# Patient Record
Sex: FEMALE | Race: WHITE | NOT HISPANIC OR LATINO | Employment: OTHER | URBAN - METROPOLITAN AREA
[De-identification: names, ages, dates, MRNs, and addresses within clinical notes are randomized per-mention and may not be internally consistent; named-entity substitution may affect disease eponyms.]

---

## 2023-02-23 PROBLEM — M54.2 CHRONIC NECK PAIN: Status: ACTIVE | Noted: 2023-02-23

## 2023-02-23 PROBLEM — I38 VHD (VALVULAR HEART DISEASE): Status: ACTIVE | Noted: 2023-02-23

## 2023-02-23 PROBLEM — Z96.641 PRESENCE OF ARTIFICIAL HIP, RIGHT: Status: ACTIVE | Noted: 2023-02-23

## 2023-02-23 PROBLEM — N39.0 ACUTE UTI: Status: ACTIVE | Noted: 2023-02-23

## 2023-02-23 PROBLEM — M17.0 ARTHRITIS OF BOTH KNEES: Status: ACTIVE | Noted: 2023-02-23

## 2023-02-23 PROBLEM — H60.319: Status: ACTIVE | Noted: 2023-02-23

## 2023-02-23 PROBLEM — E55.9 HYPOVITAMINOSIS D: Status: ACTIVE | Noted: 2023-02-23

## 2023-02-23 PROBLEM — M77.8 TENDINITIS OF RIGHT SHOULDER: Status: ACTIVE | Noted: 2023-02-23

## 2023-02-23 PROBLEM — E78.5 HYPERLIPIDEMIA: Status: ACTIVE | Noted: 2023-02-23

## 2023-02-23 PROBLEM — J06.9 URI (UPPER RESPIRATORY INFECTION): Status: ACTIVE | Noted: 2023-02-23

## 2023-02-23 PROBLEM — F32.A DEPRESSION: Status: ACTIVE | Noted: 2023-02-23

## 2023-02-23 PROBLEM — R26.81 UNSTEADY GAIT: Status: ACTIVE | Noted: 2023-02-23

## 2023-02-23 PROBLEM — M25.572 SINUS TARSI SYNDROME OF LEFT ANKLE: Status: ACTIVE | Noted: 2023-02-23

## 2023-02-23 PROBLEM — G89.29 ACUTE EXACERBATION OF CHRONIC LOW BACK PAIN: Status: ACTIVE | Noted: 2023-02-23

## 2023-02-23 PROBLEM — B35.1 ONYCHOMYCOSIS: Status: ACTIVE | Noted: 2023-02-23

## 2023-02-23 PROBLEM — G95.9 CERVICAL MYELOPATHY (MULTI): Status: ACTIVE | Noted: 2023-02-23

## 2023-02-23 PROBLEM — R27.0 ATAXIA: Status: ACTIVE | Noted: 2023-02-23

## 2023-02-23 PROBLEM — M76.892 TENDINITIS OF LEFT HIP: Status: ACTIVE | Noted: 2023-02-23

## 2023-02-23 PROBLEM — M54.42 ACUTE LEFT-SIDED LOW BACK PAIN WITH LEFT-SIDED SCIATICA: Status: ACTIVE | Noted: 2023-02-23

## 2023-02-23 PROBLEM — M54.17 LUMBOSACRAL RADICULOPATHY AT S1: Status: ACTIVE | Noted: 2023-02-23

## 2023-02-23 PROBLEM — E03.9 HYPOTHYROIDISM: Status: ACTIVE | Noted: 2023-02-23

## 2023-02-23 PROBLEM — H60.90 OTITIS EXTERNA: Status: ACTIVE | Noted: 2023-02-23

## 2023-02-23 PROBLEM — K59.03 CONSTIPATION DUE TO PAIN MEDICATION: Status: ACTIVE | Noted: 2023-02-23

## 2023-02-23 PROBLEM — J30.9 ALLERGIC RHINITIS: Status: ACTIVE | Noted: 2023-02-23

## 2023-02-23 PROBLEM — S62.509A THUMB FRACTURE: Status: ACTIVE | Noted: 2023-02-23

## 2023-02-23 PROBLEM — R73.01 IFG (IMPAIRED FASTING GLUCOSE): Status: ACTIVE | Noted: 2023-02-23

## 2023-02-23 PROBLEM — R73.9 HYPERGLYCEMIA: Status: ACTIVE | Noted: 2023-02-23

## 2023-02-23 PROBLEM — M54.50 ACUTE EXACERBATION OF CHRONIC LOW BACK PAIN: Status: ACTIVE | Noted: 2023-02-23

## 2023-02-23 PROBLEM — L85.3 XEROSIS CUTIS: Status: ACTIVE | Noted: 2023-02-23

## 2023-02-23 PROBLEM — J01.90 ACUTE SINUSITIS: Status: ACTIVE | Noted: 2023-02-23

## 2023-02-23 PROBLEM — R42 VERTIGO: Status: ACTIVE | Noted: 2023-02-23

## 2023-02-23 PROBLEM — M75.51 BURSITIS OF RIGHT SHOULDER: Status: ACTIVE | Noted: 2023-02-23

## 2023-02-23 PROBLEM — I10 HYPERTENSION: Status: ACTIVE | Noted: 2023-02-23

## 2023-02-23 PROBLEM — M25.551 RIGHT HIP PAIN: Status: ACTIVE | Noted: 2023-02-23

## 2023-02-23 PROBLEM — H10.89 OTHER CONJUNCTIVITIS: Status: ACTIVE | Noted: 2023-02-23

## 2023-02-23 PROBLEM — K21.9 ACID REFLUX: Status: ACTIVE | Noted: 2023-02-23

## 2023-02-23 PROBLEM — S62.523A CLOSED FRACTURE OF DISTAL PHALANX OF THUMB: Status: ACTIVE | Noted: 2023-02-23

## 2023-02-23 PROBLEM — M77.8 OTHER ENTHESOPATHIES, NOT ELSEWHERE CLASSIFIED: Status: ACTIVE | Noted: 2023-02-23

## 2023-02-23 PROBLEM — L60.1 ONYCHOLYSIS: Status: ACTIVE | Noted: 2023-02-23

## 2023-02-23 PROBLEM — M25.572 LEFT LATERAL ANKLE PAIN: Status: ACTIVE | Noted: 2023-02-23

## 2023-02-23 PROBLEM — M25.562 KNEE PAIN, LEFT: Status: ACTIVE | Noted: 2023-02-23

## 2023-02-23 PROBLEM — M16.11 ARTHRITIS OF RIGHT HIP: Status: ACTIVE | Noted: 2023-02-23

## 2023-02-23 PROBLEM — R01.1 MURMUR, CARDIAC: Status: ACTIVE | Noted: 2023-02-23

## 2023-02-23 PROBLEM — M25.519 SHOULDER PAIN: Status: ACTIVE | Noted: 2023-02-23

## 2023-02-23 PROBLEM — G89.29 CHRONIC NECK PAIN: Status: ACTIVE | Noted: 2023-02-23

## 2023-02-23 RX ORDER — FLUTICASONE PROPIONATE 50 MCG
1 SPRAY, SUSPENSION (ML) NASAL DAILY
COMMUNITY

## 2023-02-23 RX ORDER — ACETAMINOPHEN 325 MG/1
TABLET ORAL EVERY 6 HOURS
COMMUNITY
Start: 2020-03-18

## 2023-02-23 RX ORDER — BUSPIRONE HYDROCHLORIDE 10 MG/1
1 TABLET ORAL 2 TIMES DAILY
COMMUNITY
Start: 2017-02-17 | End: 2023-06-28 | Stop reason: SDUPTHER

## 2023-02-23 RX ORDER — OMEPRAZOLE 20 MG/1
1 CAPSULE, DELAYED RELEASE ORAL DAILY
COMMUNITY
Start: 2016-04-12 | End: 2023-04-05 | Stop reason: SDUPTHER

## 2023-02-23 RX ORDER — AMMONIUM LACTATE 12 G/100G
LOTION TOPICAL
COMMUNITY
End: 2023-04-05 | Stop reason: ALTCHOICE

## 2023-02-23 RX ORDER — SERTRALINE HYDROCHLORIDE 50 MG/1
1.5 TABLET, FILM COATED ORAL DAILY
COMMUNITY
Start: 2017-06-28 | End: 2023-03-10

## 2023-02-23 RX ORDER — ASPIRIN 81 MG/1
81 TABLET ORAL DAILY
COMMUNITY

## 2023-02-23 RX ORDER — LOVASTATIN 40 MG/1
TABLET ORAL
COMMUNITY
Start: 2016-04-15 | End: 2023-06-04 | Stop reason: SDUPTHER

## 2023-02-23 RX ORDER — CHOLECALCIFEROL (VITAMIN D3) 25 MCG
25 TABLET ORAL DAILY
COMMUNITY

## 2023-02-23 RX ORDER — POLYETHYLENE GLYCOL 3350 17 G/17G
17 POWDER, FOR SOLUTION ORAL 2 TIMES DAILY
COMMUNITY
Start: 2020-03-18

## 2023-02-23 RX ORDER — HYDROCHLOROTHIAZIDE 25 MG/1
1 TABLET ORAL DAILY
COMMUNITY
Start: 2018-02-27 | End: 2023-06-04 | Stop reason: SDUPTHER

## 2023-02-23 RX ORDER — DOCUSATE SODIUM 100 MG/1
CAPSULE, LIQUID FILLED ORAL
COMMUNITY
Start: 2020-03-18 | End: 2023-04-05 | Stop reason: ALTCHOICE

## 2023-02-23 RX ORDER — LEVOTHYROXINE SODIUM 50 UG/1
TABLET ORAL
COMMUNITY
Start: 2018-02-27 | End: 2023-06-28 | Stop reason: SDUPTHER

## 2023-03-10 DIAGNOSIS — F32.A DEPRESSION, UNSPECIFIED DEPRESSION TYPE: Primary | ICD-10-CM

## 2023-03-10 RX ORDER — SERTRALINE HYDROCHLORIDE 50 MG/1
TABLET, FILM COATED ORAL
Qty: 135 TABLET | Refills: 0 | Status: SHIPPED | OUTPATIENT
Start: 2023-03-10 | End: 2023-06-28 | Stop reason: SDUPTHER

## 2023-04-05 ENCOUNTER — LAB (OUTPATIENT)
Dept: LAB | Facility: LAB | Age: 83
End: 2023-04-05
Payer: MEDICARE

## 2023-04-05 ENCOUNTER — OFFICE VISIT (OUTPATIENT)
Dept: PRIMARY CARE | Facility: CLINIC | Age: 83
End: 2023-04-05
Payer: MEDICARE

## 2023-04-05 VITALS
RESPIRATION RATE: 18 BRPM | BODY MASS INDEX: 30.8 KG/M2 | OXYGEN SATURATION: 96 % | WEIGHT: 163 LBS | SYSTOLIC BLOOD PRESSURE: 120 MMHG | DIASTOLIC BLOOD PRESSURE: 70 MMHG | TEMPERATURE: 97.5 F | HEART RATE: 69 BPM

## 2023-04-05 DIAGNOSIS — E06.3 HYPOTHYROIDISM DUE TO HASHIMOTO'S THYROIDITIS: ICD-10-CM

## 2023-04-05 DIAGNOSIS — M25.572 LEFT LATERAL ANKLE PAIN: ICD-10-CM

## 2023-04-05 DIAGNOSIS — E03.8 HYPOTHYROIDISM DUE TO HASHIMOTO'S THYROIDITIS: ICD-10-CM

## 2023-04-05 DIAGNOSIS — I10 PRIMARY HYPERTENSION: Primary | ICD-10-CM

## 2023-04-05 DIAGNOSIS — I10 PRIMARY HYPERTENSION: ICD-10-CM

## 2023-04-05 DIAGNOSIS — E78.49 OTHER HYPERLIPIDEMIA: ICD-10-CM

## 2023-04-05 DIAGNOSIS — M47.812 SPONDYLOSIS OF CERVICAL REGION WITHOUT MYELOPATHY OR RADICULOPATHY: ICD-10-CM

## 2023-04-05 DIAGNOSIS — K21.9 GASTROESOPHAGEAL REFLUX DISEASE, UNSPECIFIED WHETHER ESOPHAGITIS PRESENT: ICD-10-CM

## 2023-04-05 LAB
ALANINE AMINOTRANSFERASE (SGPT) (U/L) IN SER/PLAS: 10 U/L (ref 7–45)
ALBUMIN (G/DL) IN SER/PLAS: 4.2 G/DL (ref 3.4–5)
ALKALINE PHOSPHATASE (U/L) IN SER/PLAS: 52 U/L (ref 33–136)
ANION GAP IN SER/PLAS: 12 MMOL/L (ref 10–20)
ASPARTATE AMINOTRANSFERASE (SGOT) (U/L) IN SER/PLAS: 20 U/L (ref 9–39)
BILIRUBIN TOTAL (MG/DL) IN SER/PLAS: 0.6 MG/DL (ref 0–1.2)
CALCIUM (MG/DL) IN SER/PLAS: 9.5 MG/DL (ref 8.6–10.3)
CARBON DIOXIDE, TOTAL (MMOL/L) IN SER/PLAS: 33 MMOL/L (ref 21–32)
CHLORIDE (MMOL/L) IN SER/PLAS: 101 MMOL/L (ref 98–107)
CREATININE (MG/DL) IN SER/PLAS: 0.75 MG/DL (ref 0.5–1.05)
GFR FEMALE: 80 ML/MIN/1.73M2
GLUCOSE (MG/DL) IN SER/PLAS: 94 MG/DL (ref 74–99)
POTASSIUM (MMOL/L) IN SER/PLAS: 4.8 MMOL/L (ref 3.5–5.3)
PROTEIN TOTAL: 7.2 G/DL (ref 6.4–8.2)
SODIUM (MMOL/L) IN SER/PLAS: 141 MMOL/L (ref 136–145)
UREA NITROGEN (MG/DL) IN SER/PLAS: 17 MG/DL (ref 6–23)

## 2023-04-05 PROCEDURE — 3078F DIAST BP <80 MM HG: CPT | Performed by: INTERNAL MEDICINE

## 2023-04-05 PROCEDURE — 1036F TOBACCO NON-USER: CPT | Performed by: INTERNAL MEDICINE

## 2023-04-05 PROCEDURE — 1159F MED LIST DOCD IN RCRD: CPT | Performed by: INTERNAL MEDICINE

## 2023-04-05 PROCEDURE — 99214 OFFICE O/P EST MOD 30 MIN: CPT | Performed by: INTERNAL MEDICINE

## 2023-04-05 PROCEDURE — 36415 COLL VENOUS BLD VENIPUNCTURE: CPT

## 2023-04-05 PROCEDURE — 3074F SYST BP LT 130 MM HG: CPT | Performed by: INTERNAL MEDICINE

## 2023-04-05 PROCEDURE — 80053 COMPREHEN METABOLIC PANEL: CPT

## 2023-04-05 RX ORDER — OMEPRAZOLE 20 MG/1
20 CAPSULE, DELAYED RELEASE ORAL DAILY
Qty: 90 CAPSULE | Refills: 1 | Status: SHIPPED | OUTPATIENT
Start: 2023-04-05 | End: 2023-06-28 | Stop reason: SDUPTHER

## 2023-04-05 ASSESSMENT — PATIENT HEALTH QUESTIONNAIRE - PHQ9
SUM OF ALL RESPONSES TO PHQ9 QUESTIONS 1 AND 2: 0
2. FEELING DOWN, DEPRESSED OR HOPELESS: NOT AT ALL
1. LITTLE INTEREST OR PLEASURE IN DOING THINGS: NOT AT ALL

## 2023-04-05 NOTE — PROGRESS NOTES
Subjective   Patient ID: Ayo Bueno is a 82 y.o. female who presents for Follow-up (Patient seems unstable when walking.  C/o neck pain.).    HPI patient presents to the office for scheduled visit with daughter who reports to me today a couple of complaints 1 is that neck pain that has been persistent and chronic as well as now for some pain involving the Achilles and heel of the left foot.  She denies any evidence of trauma she has difficulty with walking although she does not really move much during the day but is able to dorsiflex and plantarflex without any problems    She takes Tylenol once a day in the mornings 2 pills at    Day with improvement of pain completely.    Otherwise blood pressure is good    Is without any chest pain or dyspnea no significant complaints thyroid status    Continues to be on omeprazole for GERD    Blood pressure is good on the thiazide diuretic and mood is good on buspirone    Zoloft is also at this point continue        Review of Systems    Objective   /70 (BP Location: Left arm, Patient Position: Sitting)   Pulse 69   Temp 36.4 °C (97.5 °F)   Resp 18   Wt 73.9 kg (163 lb)   SpO2 96%   BMI 30.80 kg/m²     Physical Exam HEENT normocephalic atraumatic pupils round and reactive no oropharyngeal exudate no thyromegaly  Carotid bruits absent range of motion of the neck is somewhat limited in all directions  Cardiovascular regular rate and rhythm no murmurs  Respiratory bilateral breath sounds without rales or rhonchi or mitral chest expansion bilaterally  Abdomen soft nontender bowel sounds are present no organomegaly  Skin warm without any rashes  Musculoskeletal no digital clubbing or cyanosis normal gait no muscle atrophy  Neuro alert and oriented Ãƒ-3. Speech clear. Follows commands appropriately  Pulse normal carotid pulse normal radial pulse normal pedal pulses  Achilles tendon is completely intact with no evidence of any tendinosis although discomfort is  noted upon complete dorsiflexion.    No trauma change in color or bruising.    Assessment/Plan   Problem List Items Addressed This Visit          Circulatory    Hypertension - Primary     Will continue the current treatment blood pressure no changes required to obtain a renal panel         Relevant Orders    Comprehensive Metabolic Panel       Digestive    Acid reflux    Relevant Medications    omeprazole (PriLOSEC) 20 mg DR capsule       Musculoskeletal    Left lateral ankle pain     Although she complains of pain around the lower Achilles and calcaneus without evidence of planter fasciitis she reports the pain to be more of an ankle in the region but at this point exam not consistent with I suspect this is arthritis but noted to be effective and will continue to follow her liver panel I recommended Tylenol is effective this is the easiest and best way to continue         Spondylosis of cervical region without myelopathy or radiculopathy       Endocrine/Metabolic    Hypothyroidism     Continue current dosing            Other    Hyperlipidemia     Continue continue with no change

## 2023-04-05 NOTE — ASSESSMENT & PLAN NOTE
She seems to tolerate treatment with NT anxiety antidepressant medication which is both buspirone and Zoloft electrolytes to be obtained but no changes noted next visit I recommended EKG as well.

## 2023-04-05 NOTE — ASSESSMENT & PLAN NOTE
Although she complains of pain around the lower Achilles and calcaneus without evidence of planter fasciitis she reports the pain to be more of an ankle in the region but at this point exam not consistent with I suspect this is arthritis but noted to be effective and will continue to follow her liver panel I recommended Tylenol is effective this is the easiest and best way to continue

## 2023-06-02 DIAGNOSIS — I10 HYPERTENSION, UNSPECIFIED TYPE: ICD-10-CM

## 2023-06-02 DIAGNOSIS — E78.5 HYPERLIPIDEMIA, UNSPECIFIED HYPERLIPIDEMIA TYPE: ICD-10-CM

## 2023-06-04 RX ORDER — LOVASTATIN 40 MG/1
TABLET ORAL
Qty: 90 TABLET | Refills: 0 | Status: SHIPPED | OUTPATIENT
Start: 2023-06-04 | End: 2023-06-28 | Stop reason: SDUPTHER

## 2023-06-04 RX ORDER — HYDROCHLOROTHIAZIDE 25 MG/1
TABLET ORAL
Qty: 90 TABLET | Refills: 0 | Status: SHIPPED | OUTPATIENT
Start: 2023-06-04 | End: 2023-06-28 | Stop reason: SDUPTHER

## 2023-06-28 DIAGNOSIS — E06.3 HYPOTHYROIDISM DUE TO HASHIMOTO'S THYROIDITIS: Primary | ICD-10-CM

## 2023-06-28 DIAGNOSIS — E03.8 HYPOTHYROIDISM DUE TO HASHIMOTO'S THYROIDITIS: Primary | ICD-10-CM

## 2023-06-28 DIAGNOSIS — K21.9 GASTROESOPHAGEAL REFLUX DISEASE, UNSPECIFIED WHETHER ESOPHAGITIS PRESENT: ICD-10-CM

## 2023-06-28 DIAGNOSIS — E78.5 HYPERLIPIDEMIA, UNSPECIFIED HYPERLIPIDEMIA TYPE: ICD-10-CM

## 2023-06-28 DIAGNOSIS — F32.A DEPRESSION, UNSPECIFIED DEPRESSION TYPE: ICD-10-CM

## 2023-06-28 DIAGNOSIS — F41.9 ANXIETY: ICD-10-CM

## 2023-06-28 DIAGNOSIS — I10 HYPERTENSION, UNSPECIFIED TYPE: ICD-10-CM

## 2023-06-29 RX ORDER — LOVASTATIN 40 MG/1
TABLET ORAL
Qty: 90 TABLET | Refills: 0 | Status: SHIPPED | OUTPATIENT
Start: 2023-06-29 | End: 2024-04-18 | Stop reason: SDUPTHER

## 2023-06-29 RX ORDER — LEVOTHYROXINE SODIUM 50 UG/1
TABLET ORAL
Qty: 108 TABLET | Refills: 0 | Status: SHIPPED | OUTPATIENT
Start: 2023-06-29 | End: 2023-11-10 | Stop reason: SDUPTHER

## 2023-06-29 RX ORDER — BUSPIRONE HYDROCHLORIDE 10 MG/1
10 TABLET ORAL 2 TIMES DAILY
Qty: 180 TABLET | Refills: 0 | Status: SHIPPED | OUTPATIENT
Start: 2023-06-29

## 2023-06-29 RX ORDER — HYDROCHLOROTHIAZIDE 25 MG/1
25 TABLET ORAL DAILY
Qty: 90 TABLET | Refills: 0 | Status: SHIPPED | OUTPATIENT
Start: 2023-06-29 | End: 2024-04-18 | Stop reason: SDUPTHER

## 2023-06-29 RX ORDER — OMEPRAZOLE 20 MG/1
20 CAPSULE, DELAYED RELEASE ORAL DAILY
Qty: 90 CAPSULE | Refills: 0 | Status: SHIPPED | OUTPATIENT
Start: 2023-06-29 | End: 2024-02-15 | Stop reason: SDUPTHER

## 2023-06-29 RX ORDER — SERTRALINE HYDROCHLORIDE 50 MG/1
75 TABLET, FILM COATED ORAL DAILY
Qty: 135 TABLET | Refills: 0 | Status: SHIPPED | OUTPATIENT
Start: 2023-06-29 | End: 2024-03-01 | Stop reason: DRUGHIGH

## 2023-08-30 ENCOUNTER — OFFICE VISIT (OUTPATIENT)
Dept: PRIMARY CARE | Facility: CLINIC | Age: 83
End: 2023-08-30
Payer: MEDICARE

## 2023-08-30 VITALS
SYSTOLIC BLOOD PRESSURE: 128 MMHG | DIASTOLIC BLOOD PRESSURE: 80 MMHG | TEMPERATURE: 97.9 F | HEART RATE: 78 BPM | BODY MASS INDEX: 30.43 KG/M2 | WEIGHT: 155 LBS | HEIGHT: 60 IN | OXYGEN SATURATION: 97 %

## 2023-08-30 DIAGNOSIS — E78.49 OTHER HYPERLIPIDEMIA: ICD-10-CM

## 2023-08-30 DIAGNOSIS — E06.3 HYPOTHYROIDISM DUE TO HASHIMOTO'S THYROIDITIS: ICD-10-CM

## 2023-08-30 DIAGNOSIS — Z79.899 HIGH RISK MEDICATION USE: ICD-10-CM

## 2023-08-30 DIAGNOSIS — E03.8 HYPOTHYROIDISM DUE TO HASHIMOTO'S THYROIDITIS: ICD-10-CM

## 2023-08-30 DIAGNOSIS — I35.0 AORTIC VALVE STENOSIS, ETIOLOGY OF CARDIAC VALVE DISEASE UNSPECIFIED: ICD-10-CM

## 2023-08-30 DIAGNOSIS — I10 PRIMARY HYPERTENSION: ICD-10-CM

## 2023-08-30 DIAGNOSIS — J31.0 CHRONIC RHINITIS: Primary | ICD-10-CM

## 2023-08-30 LAB
ALANINE AMINOTRANSFERASE (SGPT) (U/L) IN SER/PLAS: 11 U/L (ref 7–45)
ALBUMIN (G/DL) IN SER/PLAS: 4.1 G/DL (ref 3.4–5)
ALKALINE PHOSPHATASE (U/L) IN SER/PLAS: 46 U/L (ref 33–136)
ANION GAP IN SER/PLAS: 14 MMOL/L (ref 10–20)
ASPARTATE AMINOTRANSFERASE (SGOT) (U/L) IN SER/PLAS: 21 U/L (ref 9–39)
BILIRUBIN TOTAL (MG/DL) IN SER/PLAS: 0.7 MG/DL (ref 0–1.2)
CALCIUM (MG/DL) IN SER/PLAS: 9.5 MG/DL (ref 8.6–10.6)
CARBON DIOXIDE, TOTAL (MMOL/L) IN SER/PLAS: 31 MMOL/L (ref 21–32)
CHLORIDE (MMOL/L) IN SER/PLAS: 103 MMOL/L (ref 98–107)
CHOLESTEROL (MG/DL) IN SER/PLAS: 144 MG/DL (ref 0–199)
CHOLESTEROL IN HDL (MG/DL) IN SER/PLAS: 50.8 MG/DL
CHOLESTEROL/HDL RATIO: 2.8
CREATININE (MG/DL) IN SER/PLAS: 0.67 MG/DL (ref 0.5–1.05)
ERYTHROCYTE DISTRIBUTION WIDTH (RATIO) BY AUTOMATED COUNT: 13.8 % (ref 11.5–14.5)
ERYTHROCYTE MEAN CORPUSCULAR HEMOGLOBIN CONCENTRATION (G/DL) BY AUTOMATED: 33.8 G/DL (ref 32–36)
ERYTHROCYTE MEAN CORPUSCULAR VOLUME (FL) BY AUTOMATED COUNT: 94 FL (ref 80–100)
ERYTHROCYTES (10*6/UL) IN BLOOD BY AUTOMATED COUNT: 3.94 X10E12/L (ref 4–5.2)
GFR FEMALE: 88 ML/MIN/1.73M2
GLUCOSE (MG/DL) IN SER/PLAS: 107 MG/DL (ref 74–99)
HEMATOCRIT (%) IN BLOOD BY AUTOMATED COUNT: 37 % (ref 36–46)
HEMOGLOBIN (G/DL) IN BLOOD: 12.5 G/DL (ref 12–16)
LDL: 70 MG/DL (ref 0–99)
LEUKOCYTES (10*3/UL) IN BLOOD BY AUTOMATED COUNT: 3.3 X10E9/L (ref 4.4–11.3)
NRBC (PER 100 WBCS) BY AUTOMATED COUNT: 0 /100 WBC (ref 0–0)
PLATELETS (10*3/UL) IN BLOOD AUTOMATED COUNT: 162 X10E9/L (ref 150–450)
POTASSIUM (MMOL/L) IN SER/PLAS: 3.7 MMOL/L (ref 3.5–5.3)
PROTEIN TOTAL: 7.2 G/DL (ref 6.4–8.2)
SODIUM (MMOL/L) IN SER/PLAS: 144 MMOL/L (ref 136–145)
THYROTROPIN (MIU/L) IN SER/PLAS BY DETECTION LIMIT <= 0.05 MIU/L: 1.07 MIU/L (ref 0.44–3.98)
TRIGLYCERIDE (MG/DL) IN SER/PLAS: 114 MG/DL (ref 0–149)
UREA NITROGEN (MG/DL) IN SER/PLAS: 22 MG/DL (ref 6–23)
VLDL: 23 MG/DL (ref 0–40)

## 2023-08-30 PROCEDURE — 84443 ASSAY THYROID STIM HORMONE: CPT

## 2023-08-30 PROCEDURE — 85027 COMPLETE CBC AUTOMATED: CPT

## 2023-08-30 PROCEDURE — 1159F MED LIST DOCD IN RCRD: CPT | Performed by: FAMILY MEDICINE

## 2023-08-30 PROCEDURE — 3074F SYST BP LT 130 MM HG: CPT | Performed by: FAMILY MEDICINE

## 2023-08-30 PROCEDURE — 80061 LIPID PANEL: CPT

## 2023-08-30 PROCEDURE — 80053 COMPREHEN METABOLIC PANEL: CPT

## 2023-08-30 PROCEDURE — 3079F DIAST BP 80-89 MM HG: CPT | Performed by: FAMILY MEDICINE

## 2023-08-30 PROCEDURE — 1036F TOBACCO NON-USER: CPT | Performed by: FAMILY MEDICINE

## 2023-08-30 PROCEDURE — 1126F AMNT PAIN NOTED NONE PRSNT: CPT | Performed by: FAMILY MEDICINE

## 2023-08-30 PROCEDURE — 99214 OFFICE O/P EST MOD 30 MIN: CPT | Performed by: FAMILY MEDICINE

## 2023-08-30 RX ORDER — IPRATROPIUM BROMIDE 42 UG/1
2 SPRAY, METERED NASAL 3 TIMES DAILY PRN
Qty: 15 ML | Refills: 12 | Status: SHIPPED | OUTPATIENT
Start: 2023-08-30 | End: 2023-08-30 | Stop reason: SDUPTHER

## 2023-08-30 RX ORDER — IPRATROPIUM BROMIDE 42 UG/1
2 SPRAY, METERED NASAL 3 TIMES DAILY PRN
Qty: 15 ML | Refills: 12 | Status: SHIPPED | OUTPATIENT
Start: 2023-08-30 | End: 2024-08-29

## 2023-08-30 SDOH — ECONOMIC STABILITY: INCOME INSECURITY: IN THE LAST 12 MONTHS, WAS THERE A TIME WHEN YOU WERE NOT ABLE TO PAY THE MORTGAGE OR RENT ON TIME?: NO

## 2023-08-30 SDOH — ECONOMIC STABILITY: HOUSING INSECURITY
IN THE LAST 12 MONTHS, WAS THERE A TIME WHEN YOU DID NOT HAVE A STEADY PLACE TO SLEEP OR SLEPT IN A SHELTER (INCLUDING NOW)?: NO

## 2023-08-30 SDOH — ECONOMIC STABILITY: FOOD INSECURITY: WITHIN THE PAST 12 MONTHS, THE FOOD YOU BOUGHT JUST DIDN'T LAST AND YOU DIDN'T HAVE MONEY TO GET MORE.: NEVER TRUE

## 2023-08-30 SDOH — ECONOMIC STABILITY: TRANSPORTATION INSECURITY
IN THE PAST 12 MONTHS, HAS LACK OF TRANSPORTATION KEPT YOU FROM MEETINGS, WORK, OR FROM GETTING THINGS NEEDED FOR DAILY LIVING?: NO

## 2023-08-30 SDOH — ECONOMIC STABILITY: TRANSPORTATION INSECURITY
IN THE PAST 12 MONTHS, HAS THE LACK OF TRANSPORTATION KEPT YOU FROM MEDICAL APPOINTMENTS OR FROM GETTING MEDICATIONS?: NO

## 2023-08-30 SDOH — ECONOMIC STABILITY: FOOD INSECURITY: WITHIN THE PAST 12 MONTHS, YOU WORRIED THAT YOUR FOOD WOULD RUN OUT BEFORE YOU GOT MONEY TO BUY MORE.: NEVER TRUE

## 2023-08-30 ASSESSMENT — ENCOUNTER SYMPTOMS
OCCASIONAL FEELINGS OF UNSTEADINESS: 0
LOSS OF SENSATION IN FEET: 0
DEPRESSION: 0

## 2023-08-30 ASSESSMENT — SOCIAL DETERMINANTS OF HEALTH (SDOH)
WITHIN THE LAST YEAR, HAVE YOU BEEN AFRAID OF YOUR PARTNER OR EX-PARTNER?: NO
WITHIN THE LAST YEAR, HAVE YOU BEEN HUMILIATED OR EMOTIONALLY ABUSED IN OTHER WAYS BY YOUR PARTNER OR EX-PARTNER?: NO
HOW HARD IS IT FOR YOU TO PAY FOR THE VERY BASICS LIKE FOOD, HOUSING, MEDICAL CARE, AND HEATING?: NOT HARD AT ALL
WITHIN THE LAST YEAR, HAVE TO BEEN RAPED OR FORCED TO HAVE ANY KIND OF SEXUAL ACTIVITY BY YOUR PARTNER OR EX-PARTNER?: NO
IN THE PAST 12 MONTHS, HAS THE ELECTRIC, GAS, OIL, OR WATER COMPANY THREATENED TO SHUT OFF SERVICE IN YOUR HOME?: NO
WITHIN THE LAST YEAR, HAVE YOU BEEN KICKED, HIT, SLAPPED, OR OTHERWISE PHYSICALLY HURT BY YOUR PARTNER OR EX-PARTNER?: NO

## 2023-08-30 ASSESSMENT — LIFESTYLE VARIABLES
SKIP TO QUESTIONS 9-10: 1
HOW OFTEN DO YOU HAVE A DRINK CONTAINING ALCOHOL: NEVER
HOW OFTEN DO YOU HAVE SIX OR MORE DRINKS ON ONE OCCASION: NEVER
HOW MANY STANDARD DRINKS CONTAINING ALCOHOL DO YOU HAVE ON A TYPICAL DAY: PATIENT DOES NOT DRINK
AUDIT-C TOTAL SCORE: 0

## 2023-08-30 ASSESSMENT — PAIN SCALES - GENERAL: PAINLEVEL: 0-NO PAIN

## 2023-08-30 NOTE — PROGRESS NOTES
Subjective   Patient ID: Ayo Bueno is a 82 y.o. female who presents for New Patient Visit (Npv est/).    HPI patient here today to establish.  She is companied by her  and daughter.  Patient states she has been having chronic neck and low back discomforts.  These been going on for many months.  Daughter feels she is overall doing well.  Past medical history, information in the chart including labs were reviewed.  Patient does complain of chronic sinus drainage.  She would like to try something to help with this.    Review of Systems    Objective   /80 (BP Location: Left arm)   Pulse 78   Temp 36.6 °C (97.9 °F) (Temporal)   Ht 1.524 m (5')   Wt 70.3 kg (155 lb)   SpO2 97%   BMI 30.27 kg/m²     Physical Exam  Alert, pleasant and in no acute distress.  Heart: Regular rate and rhythm with 3/6 murmur  Lungs: Clear to auscultation  Lower extremities: No edema  Assessment/Plan   Problem List Items Addressed This Visit          Cardiac and Vasculature    Hyperlipidemia    Relevant Orders    Lipid Panel    Hypertension    Relevant Orders    Comprehensive Metabolic Panel    CBC       Endocrine/Metabolic    Hypothyroidism    Relevant Orders    TSH with reflex to Free T4 if abnormal     Other Visit Diagnoses       Chronic rhinitis    -  Primary    Relevant Medications    ipratropium (Atrovent) 42 mcg (0.06 %) nasal spray    Aortic valve stenosis, etiology of cardiac valve disease unspecified        Relevant Orders    Transthoracic Echo (TTE) Complete    Referral to Cardiology    Comprehensive Metabolic Panel    CBC    High risk medication use        Relevant Orders    Comprehensive Metabolic Panel    CBC        Patient here to establish.  We are going to try some ipratropium to help her sinus congestion.  Patient has aortic valve stenosis on previous echocardiogram and on exam.  We will get an echo to follow-up on how it is progressing.  Patient to follow-up with cardiology to discuss things in  detail.  Patient has not had blood work done in the last year so we will get full labs.  We will contact as lab results come in.  Patient with neck and back discomforts.  We discussed the importance of therapeutic exercises.  Website information provided.  We will consider formal physical therapy if things are not improving.  Follow-up in 3 to 4 months.

## 2023-10-05 ENCOUNTER — APPOINTMENT (OUTPATIENT)
Dept: PRIMARY CARE | Facility: CLINIC | Age: 83
End: 2023-10-05
Payer: MEDICARE

## 2023-10-16 ENCOUNTER — HOSPITAL ENCOUNTER (OUTPATIENT)
Dept: CARDIOLOGY | Facility: CLINIC | Age: 83
Discharge: HOME | End: 2023-10-16
Payer: MEDICARE

## 2023-10-16 DIAGNOSIS — I35.0 NONRHEUMATIC AORTIC (VALVE) STENOSIS: ICD-10-CM

## 2023-10-16 DIAGNOSIS — I05.0 RHEUMATIC MITRAL STENOSIS: ICD-10-CM

## 2023-10-16 LAB — EJECTION FRACTION APICAL 4 CHAMBER: 67.7

## 2023-10-16 PROCEDURE — 93306 TTE W/DOPPLER COMPLETE: CPT

## 2023-10-16 PROCEDURE — 93306 TTE W/DOPPLER COMPLETE: CPT | Performed by: INTERNAL MEDICINE

## 2023-11-10 DIAGNOSIS — E06.3 HYPOTHYROIDISM DUE TO HASHIMOTO'S THYROIDITIS: ICD-10-CM

## 2023-11-10 DIAGNOSIS — E03.8 HYPOTHYROIDISM DUE TO HASHIMOTO'S THYROIDITIS: ICD-10-CM

## 2023-11-10 RX ORDER — LEVOTHYROXINE SODIUM 50 UG/1
TABLET ORAL
Qty: 108 TABLET | Refills: 3 | Status: SHIPPED | OUTPATIENT
Start: 2023-11-10

## 2023-11-10 NOTE — TELEPHONE ENCOUNTER
Rx Refill Request Telephone Encounter    Name:  Ayo Bueno  :  [id number]  Medication Name:    Requested Prescriptions     Pending Prescriptions Disp Refills    levothyroxine (Synthroid, Levoxyl) 50 mcg tablet 108 tablet 3     Sig: TAKE ONE TABLET DAILY MONDAY THROUGH FRIDAY AND 2 TABLETS ON SATURDAY AND    Specific Pharmacy location:    [facility] - [facility] [address]  [address] [facility] [address]  Phone: [phone] Fax: [phone]  Date of last appointment:  2023  Date of next appointment:  1/15/2024  Best number to reach patient:  [phone] (home)

## 2023-11-14 ENCOUNTER — APPOINTMENT (OUTPATIENT)
Dept: CARDIOLOGY | Facility: CLINIC | Age: 83
End: 2023-11-14
Payer: MEDICARE

## 2023-11-15 PROBLEM — I35.0 AORTIC STENOSIS: Chronic | Status: ACTIVE | Noted: 2023-11-15

## 2023-11-15 PROBLEM — R06.02 SHORTNESS OF BREATH: Status: ACTIVE | Noted: 2023-11-15

## 2023-11-15 PROBLEM — I05.0 MITRAL STENOSIS: Status: ACTIVE | Noted: 2023-11-15

## 2023-11-21 ENCOUNTER — OFFICE VISIT (OUTPATIENT)
Dept: CARDIOLOGY | Facility: CLINIC | Age: 83
End: 2023-11-21
Payer: MEDICARE

## 2023-11-21 VITALS
DIASTOLIC BLOOD PRESSURE: 68 MMHG | OXYGEN SATURATION: 97 % | SYSTOLIC BLOOD PRESSURE: 118 MMHG | HEART RATE: 68 BPM | BODY MASS INDEX: 30.08 KG/M2 | WEIGHT: 154 LBS

## 2023-11-21 DIAGNOSIS — I35.0 NONRHEUMATIC AORTIC VALVE STENOSIS: Chronic | ICD-10-CM

## 2023-11-21 DIAGNOSIS — I10 PRIMARY HYPERTENSION: Chronic | ICD-10-CM

## 2023-11-21 DIAGNOSIS — E78.2 MIXED HYPERLIPIDEMIA: Chronic | ICD-10-CM

## 2023-11-21 DIAGNOSIS — R06.02 SHORTNESS OF BREATH: Chronic | ICD-10-CM

## 2023-11-21 DIAGNOSIS — I34.2 NONRHEUMATIC MITRAL VALVE STENOSIS: Primary | Chronic | ICD-10-CM

## 2023-11-21 PROBLEM — H10.89 OTHER CONJUNCTIVITIS: Status: RESOLVED | Noted: 2023-02-23 | Resolved: 2023-11-21

## 2023-11-21 PROBLEM — K59.03 CONSTIPATION DUE TO PAIN MEDICATION: Status: RESOLVED | Noted: 2023-02-23 | Resolved: 2023-11-21

## 2023-11-21 PROBLEM — H60.90 OTITIS EXTERNA: Status: RESOLVED | Noted: 2023-02-23 | Resolved: 2023-11-21

## 2023-11-21 PROBLEM — S62.509A THUMB FRACTURE: Status: RESOLVED | Noted: 2023-02-23 | Resolved: 2023-11-21

## 2023-11-21 PROBLEM — J30.9 ALLERGIC RHINITIS: Status: RESOLVED | Noted: 2023-02-23 | Resolved: 2023-11-21

## 2023-11-21 PROBLEM — L60.1 ONYCHOLYSIS: Status: RESOLVED | Noted: 2023-02-23 | Resolved: 2023-11-21

## 2023-11-21 PROBLEM — M77.8 TENDINITIS OF RIGHT SHOULDER: Status: RESOLVED | Noted: 2023-02-23 | Resolved: 2023-11-21

## 2023-11-21 PROBLEM — G89.29 ACUTE EXACERBATION OF CHRONIC LOW BACK PAIN: Status: RESOLVED | Noted: 2023-02-23 | Resolved: 2023-11-21

## 2023-11-21 PROBLEM — M25.562 KNEE PAIN, LEFT: Status: RESOLVED | Noted: 2023-02-23 | Resolved: 2023-11-21

## 2023-11-21 PROBLEM — J06.9 URI (UPPER RESPIRATORY INFECTION): Status: RESOLVED | Noted: 2023-02-23 | Resolved: 2023-11-21

## 2023-11-21 PROBLEM — Z96.641 PRESENCE OF ARTIFICIAL HIP, RIGHT: Status: RESOLVED | Noted: 2023-02-23 | Resolved: 2023-11-21

## 2023-11-21 PROBLEM — M76.892 TENDINITIS OF LEFT HIP: Status: RESOLVED | Noted: 2023-02-23 | Resolved: 2023-11-21

## 2023-11-21 PROBLEM — M75.51 BURSITIS OF RIGHT SHOULDER: Status: RESOLVED | Noted: 2023-02-23 | Resolved: 2023-11-21

## 2023-11-21 PROBLEM — N39.0 ACUTE UTI: Status: RESOLVED | Noted: 2023-02-23 | Resolved: 2023-11-21

## 2023-11-21 PROBLEM — M25.551 RIGHT HIP PAIN: Status: RESOLVED | Noted: 2023-02-23 | Resolved: 2023-11-21

## 2023-11-21 PROBLEM — R42 VERTIGO: Status: RESOLVED | Noted: 2023-02-23 | Resolved: 2023-11-21

## 2023-11-21 PROBLEM — M16.11 ARTHRITIS OF RIGHT HIP: Status: RESOLVED | Noted: 2023-02-23 | Resolved: 2023-11-21

## 2023-11-21 PROBLEM — M54.50 ACUTE EXACERBATION OF CHRONIC LOW BACK PAIN: Status: RESOLVED | Noted: 2023-02-23 | Resolved: 2023-11-21

## 2023-11-21 PROBLEM — M25.572 LEFT LATERAL ANKLE PAIN: Status: RESOLVED | Noted: 2023-02-23 | Resolved: 2023-11-21

## 2023-11-21 PROBLEM — I05.0 MITRAL STENOSIS: Chronic | Status: ACTIVE | Noted: 2023-11-15

## 2023-11-21 PROBLEM — L85.3 XEROSIS CUTIS: Status: RESOLVED | Noted: 2023-02-23 | Resolved: 2023-11-21

## 2023-11-21 PROBLEM — M54.42 ACUTE LEFT-SIDED LOW BACK PAIN WITH LEFT-SIDED SCIATICA: Status: RESOLVED | Noted: 2023-02-23 | Resolved: 2023-11-21

## 2023-11-21 PROBLEM — M25.519 SHOULDER PAIN: Status: RESOLVED | Noted: 2023-02-23 | Resolved: 2023-11-21

## 2023-11-21 PROBLEM — M17.0 ARTHRITIS OF BOTH KNEES: Status: RESOLVED | Noted: 2023-02-23 | Resolved: 2023-11-21

## 2023-11-21 PROBLEM — M54.2 CHRONIC NECK PAIN: Status: RESOLVED | Noted: 2023-02-23 | Resolved: 2023-11-21

## 2023-11-21 PROBLEM — G89.29 CHRONIC NECK PAIN: Status: RESOLVED | Noted: 2023-02-23 | Resolved: 2023-11-21

## 2023-11-21 PROBLEM — E78.5 HYPERLIPIDEMIA: Chronic | Status: ACTIVE | Noted: 2023-02-23

## 2023-11-21 PROBLEM — K21.9 ACID REFLUX: Status: RESOLVED | Noted: 2023-02-23 | Resolved: 2023-11-21

## 2023-11-21 PROBLEM — B35.1 ONYCHOMYCOSIS: Status: RESOLVED | Noted: 2023-02-23 | Resolved: 2023-11-21

## 2023-11-21 PROCEDURE — 1160F RVW MEDS BY RX/DR IN RCRD: CPT | Performed by: INTERNAL MEDICINE

## 2023-11-21 PROCEDURE — 1159F MED LIST DOCD IN RCRD: CPT | Performed by: INTERNAL MEDICINE

## 2023-11-21 PROCEDURE — 3078F DIAST BP <80 MM HG: CPT | Performed by: INTERNAL MEDICINE

## 2023-11-21 PROCEDURE — 3074F SYST BP LT 130 MM HG: CPT | Performed by: INTERNAL MEDICINE

## 2023-11-21 PROCEDURE — 1036F TOBACCO NON-USER: CPT | Performed by: INTERNAL MEDICINE

## 2023-11-21 PROCEDURE — 99215 OFFICE O/P EST HI 40 MIN: CPT | Performed by: INTERNAL MEDICINE

## 2023-11-21 PROCEDURE — 1126F AMNT PAIN NOTED NONE PRSNT: CPT | Performed by: INTERNAL MEDICINE

## 2023-11-21 NOTE — PROGRESS NOTES
Referred by No ref. provider found    HPI She just lost her  last week. Still with SOB    Past Medical History:  Problem List Items Addressed This Visit    None       Past Medical History:   Diagnosis Date    Aortic stenosis 11/15/2023    moderate    Benign neoplasm, unspecified site     Adenoma    Corns and callosities     Pre-ulcerative corn or callous    Impingement syndrome of unspecified shoulder     Shoulder impingement syndrome    Personal history of other diseases of the nervous system and sense organs     History of carpal tunnel syndrome    Personal history of other diseases of urinary system     History of hematuria    Personal history of other endocrine, nutritional and metabolic disease     History of hypokalemia    Personal history of other endocrine, nutritional and metabolic disease     History of obesity    Personal history of other endocrine, nutritional and metabolic disease     History of hyperlipidemia    Personal history of other endocrine, nutritional and metabolic disease     History of hypothyroidism    Personal history of other specified conditions     History of nausea    Unspecified tear of unspecified meniscus, current injury, left knee, initial encounter     Tear of cartilage of left knee             Past Surgical History:  She has a past surgical history that includes Other surgical history (07/12/2018); Appendectomy (07/12/2018); and Knee surgery (09/19/2018).      Social History:  She reports that she has never smoked. She has never used smokeless tobacco. No history on file for alcohol use and drug use.    Family History:  Family History   Problem Relation Name Age of Onset    Tuberculosis Mother      Stroke Sister      Coronary artery disease Sister      Diabetes Brother          Allergies:  Aspirin, Prednisone, and Sulfamethoxazole-trimethoprim    Outpatient Medications:  Current Outpatient Medications   Medication Instructions    acetaminophen (Tylenol) 325 mg tablet oral,  Every 6 hours, 2 tab(s) orally every 6 hours-continue routinely around the clock for next 72 hours then decrease to as needed for mild pain or temperature    aspirin 81 mg, oral, Daily    busPIRone (BUSPAR) 10 mg, oral, 2 times daily    cholecalciferol (VITAMIN D-3) 25 mcg, oral, Daily    fluticasone (Flonase) 50 mcg/actuation nasal spray 1 spray, Each Nostril, Daily    hydroCHLOROthiazide (HYDRODIURIL) 25 mg, oral, Daily    ipratropium (Atrovent) 42 mcg (0.06 %) nasal spray 2 sprays, Each Nostril, 3 times daily PRN    levothyroxine (Synthroid, Levoxyl) 50 mcg tablet TAKE ONE TABLET DAILY MONDAY THROUGH FRIDAY AND 2 TABLETS ON SATURDAY AND SUNDAY    lovastatin (Mevacor) 40 mg tablet TAKE 1 TABLET EVERY DAY AT DINNER    omeprazole (PRILOSEC) 20 mg, oral, Daily    polyethylene glycol (GLYCOLAX, MIRALAX) 17 g, oral, 2 times daily, 17 gram(s) orally 2 times a day as needed for constipation    sertraline (ZOLOFT) 75 mg, oral, Daily        Last Recorded Vitals:  There were no vitals filed for this visit.    Physical Exam    Physical  Patient is alert and oriented x3.  HEENT is unremarkable mucous members are moist  Neck no JVP no bruits upstrokes are full no thyromegaly  Lungs are clear bilaterally.  No wheezing crackles or rales  Heart regular rhythm normal S1-S2 there is no S3 3/6 A S murmur  Abdomen is soft vessels are positive nontender nondistended no organomegaly no pulsatile masses  Extremities have no edema.  Distal pulses present palpable.  Neuro is grossly nonfocal  Skin has no rashes     Last Labs:  CBC -  Lab Results   Component Value Date    WBC 3.3 (L) 08/30/2023    HGB 12.5 08/30/2023    HCT 37.0 08/30/2023    MCV 94 08/30/2023     08/30/2023       CMP -  Lab Results   Component Value Date    CALCIUM 9.5 08/30/2023    PROT 7.2 08/30/2023    ALBUMIN 4.1 08/30/2023    AST 21 08/30/2023    ALT 11 08/30/2023    ALKPHOS 46 08/30/2023    BILITOT 0.7 08/30/2023       LIPID PANEL -   Lab Results   Component  "Value Date    CHOL 144 08/30/2023    HDL 50.8 08/30/2023    CHHDL 2.8 08/30/2023    VLDL 23 08/30/2023    TRIG 114 08/30/2023       RENAL FUNCTION PANEL -   Lab Results   Component Value Date    K 3.7 08/30/2023       No results found for: \"BNP\", \"HGBA1C\"  Procedure  Echo 9/2023 EF 60%, Mild MS with mild/mod MR, Severe A S A V max 4.9m/s 95/55mmHg with mild to mod A I    ECHO [04/22/2021]: EF 60-65%. SD = impaired relaxation pattern. MV mod thickened. Mild mitral stenosis. Mod MAC. Mod AS (AVmax 3 M/s, mean gradient 20 mmHg, DI 0.36, NEHEMIAH 1 cm2). Mild AR.          Assessment/Plan   1. Aortic stenosis.Critical with mild to mod A I and mild/mod MS.  The peak gradient is 95 mmHg.  This is increased significantly from 2021.  I had a conversation with her and her daughter about proceeding with valve replacement.  She does have mild mitral stenosis.  However I do believe that the severity of the aortic stenosis is causing her symptoms of shortness of breath.  A TAVR would be reasonable at this age rather than a double valve replacement.  Cardiac catheterization will be required.  Unfortunately she just lost her  in 1 week ago.  We will give her time to grrtoyve.  She will meet with the structural heart team.  Catheterization will be done sometime after the new year at which time her TAVR can be done        3. Shortness of breath.  More likely than not related to the severity of her aortic stenosis and in part possibly related to the mitral stenosis which is mild.    4. Hypertension. Well-controlled     5. Hyperlipidemia. Followed Dr. Allen. Her LDL is excellent at 70 with an HDL of 51. She will continue with lovastatin 40.    6. MS Mild.  At this age I would leave this alone     I will make a referral to the structural heart team.  When she has been evaluated we will anticipate doing a cardiac catheterization after the holidays and then proceed with the TAVR.    Fabiola Mccurdy MD     Instructions and follow up    "

## 2023-11-21 NOTE — PATIENT INSTRUCTIONS
1. Aortic stenosis.Critical with mild to mod A I and mild/mod MS.  The peak gradient is 95 mmHg.  This is increased significantly from 2021.  I had a conversation with her and her daughter about proceeding with valve replacement.  She does have mild mitral stenosis.  However I do believe that the severity of the aortic stenosis is causing her symptoms of shortness of breath.  A TAVR would be reasonable at this age rather than a double valve replacement.  Cardiac catheterization will be required.  Unfortunately she just lost her  in 1 week ago.  We will give her time to [place].  She will meet with the structural heart team.  Catheterization will be done sometime after the new year at which time her TAVR can be done        3. Shortness of breath.  More likely than not related to the severity of her aortic stenosis and in part possibly related to the mitral stenosis which is mild.    4. Hypertension. Well-controlled     5. Hyperlipidemia. Followed Dr. Allen. Her LDL is excellent at 70 with an HDL of 51. She will continue with lovastatin 40.    6. MS Mild.  At this age I would leave this alone     I will make a referral to the structural heart team.  When she has been evaluated we will anticipate doing a cardiac catheterization after the holidays and then proceed with the TAVR.   R: Patient currently at [facility] ER awaiting bed placement. Family requests metro only    0800 Inpatient Bed Call Log  Kids (Adolescents):  [facility] is posting 0 beds.   [facility] is posting 0 beds.   [facility] is posting 2 bed. Call for details.  - Declined yesterday due to acuity.    [facility] currently at capacity. Pt. remains on work-list. Intake to continue to search for appropriate beds.    0630 Intake paged Marv to present for 7A/ITC. Intake awaiting call back.    3431 Dr. Fair called intake to inform that he is reviewing patient. Intake awaiting call back.

## 2023-11-22 ENCOUNTER — TELEPHONE (OUTPATIENT)
Dept: CARDIOLOGY | Facility: HOSPITAL | Age: 83
End: 2023-11-22
Payer: MEDICARE

## 2023-12-04 ENCOUNTER — TELEMEDICINE (OUTPATIENT)
Dept: CARDIOLOGY | Facility: HOSPITAL | Age: 83
End: 2023-12-04
Payer: MEDICARE

## 2023-12-04 ENCOUNTER — APPOINTMENT (OUTPATIENT)
Dept: CARDIAC SURGERY | Facility: HOSPITAL | Age: 83
End: 2023-12-04
Payer: MEDICARE

## 2023-12-04 DIAGNOSIS — I35.0 NONRHEUMATIC AORTIC VALVE STENOSIS: Primary | ICD-10-CM

## 2023-12-04 PROCEDURE — 99204 OFFICE O/P NEW MOD 45 MIN: CPT | Performed by: INTERNAL MEDICINE

## 2023-12-05 NOTE — PROGRESS NOTES
Cardio:Kaz       HPI: Patient is a 89-year-old female with past medical history of hypertension hyperlipidemia mild mitral stenosis was referred to us for severe aortic valve stenosis.  On my interview today patient endorses dyspnea with exertion for example while doing her yard work which is a change compared to past.   patient also endorses shooting pain in the chest which is intermittent which she thinks may be chest discomfort.  Patient states that his symptoms are progressive.        ROS:    Constitutional: fatigue  Eyes: no eyesight problems, no blurred vision, no diplopia, no eye pain, no purulent discharge from the eyes, eyes not red, no dryness of the eyes and no itching of the eyes.   ENT: no nosebleeds, no hearing loss, no tinnitus, no earache, no sore throat, no hoarseness, no swollen glands in the neck and no nasal discharge.   Cardiovascular: dyspnea on exertion, no chest pain  Respiratory: no chronic cough, not coughing up sputum, no wheezing that is consistent with asthma  Gastrointestinal: no change in bowel habits, no blood in stools, no diarrhea, no constipation, no nausea, no vomiting, no abdominal pain, no signs and symptoms of ulcer disease, no fan colored stools and no intolerance to fatty foods.   Genitourinary: no hematuria,  no urinary frequency, no dysuria, no incontinence, no burning sensation during urination, no urinary hesitancy, no nocturia, no genital lesion, no testicular pain, urinary stream is not smaller and urinary stream does not start and stop.   Musculoskeletal: no arthralgias, no myalgias, no joint swelling, no joint stiffness, no muscle weakness, no back pain, no limb pain, no limb swelling and no difficulty walking.   Skin: no skin rashes, no change in skin color and pigmentation, no skin lesions and no skin lumps.   Neurological: no seizures, no frequent falls, no headaches, no dizziness, no tingling, no fainting and no limb weakness.   Psychiatric: no depression, not  "suicidal, no confusion, no memory lapses or loss, no anxiety, no personality change and no emotional problems.   Endocrine: no goiter, no thyroid disorder, no diabetes mellitus, no excessive thirst, no dry skin, no cold intolerance, no heat intolerance, no erectile dysfunction, no increased urinary frequency, no proptosis and no deepening of the voice.   Hematologic/Lymphatic: no bleeding issues.   All other systems have been reviewed and are negative for complaint.       TAVR Workup:   - NYHA: II  - Frailty: 1/5   - EKG: Not available  - TTE: 10/16/2023 EF 60 to 65%, mean gradient of aortic valve 55 mmHg aortic valve V-max 4.9 m/s aortic valve area 0.8 cm², mild mitral valve stenosis mean gradient 2.6 mmHg, mild to moderate aortic valve regurgitation  - CT TAVR: Pending  - LHC: Pending  - dental clearance: Pending    - STS  Procedure Type: Isolated AVR  Perioperative Outcome Estimate %  Operative Mortality 3.43%  Morbidity & Mortality 9.19%  Stroke 1.41%  Renal Failure 1.37%  Reoperation 3.76%  Prolonged Ventilation 4.18%  Deep Sternal Wound Infection 0.047%  Long Hospital Stay (>14 days) 3.77%  Short Hospital Stay (<6 days)* 41.6%    Physical Exam:  Phone visit          7/1/2021    11:10 AM 12/17/2021    12:20 PM 5/26/2022     1:38 PM 7/21/2022     1:32 PM 4/5/2023     1:21 PM 8/30/2023    10:32 AM 11/21/2023    10:29 AM   Vitals   Systolic 128 124 120 130 120 128 118   Diastolic 76 76 60 78 70 80 68   Heart Rate  64 75 92 69 78 68   Temp 36.2 °C (97.2 °F) 36.3 °C (97.3 °F) 36.4 °C (97.5 °F)  36.4 °C (97.5 °F) 36.6 °C (97.9 °F)    Resp   16 18 18     Height (in) 1.549 m (5' 1\")  1.549 m (5' 1\")   1.524 m (5')    Weight (lb) 170.25 169.25 165 163 163 155 154   BMI 32.17 kg/m2 31.98 kg/m2 31.18 kg/m2 30.8 kg/m2 30.8 kg/m2 30.27 kg/m2 30.08 kg/m2   BSA (m2) 1.82 m2 1.82 m2 1.79 m2 1.78 m2 1.78 m2 1.73 m2 1.72 m2   Visit Report     Report Report Report        Current Outpatient Medications   Medication Instructions    " acetaminophen (Tylenol) 325 mg tablet oral, Every 6 hours, 2 tab(s) orally every 6 hours-continue routinely around the clock for next 72 hours then decrease to as needed for mild pain or temperature    aspirin 81 mg, oral, Daily    busPIRone (BUSPAR) 10 mg, oral, 2 times daily    cholecalciferol (VITAMIN D-3) 25 mcg, oral, Daily    fluticasone (Flonase) 50 mcg/actuation nasal spray 1 spray, Each Nostril, Daily    hydroCHLOROthiazide (HYDRODIURIL) 25 mg, oral, Daily    ipratropium (Atrovent) 42 mcg (0.06 %) nasal spray 2 sprays, Each Nostril, 3 times daily PRN    levothyroxine (Synthroid, Levoxyl) 50 mcg tablet TAKE ONE TABLET DAILY MONDAY THROUGH FRIDAY AND 2 TABLETS ON SATURDAY AND SUNDAY    lovastatin (Mevacor) 40 mg tablet TAKE 1 TABLET EVERY DAY AT DINNER    omeprazole (PRILOSEC) 20 mg, oral, Daily    polyethylene glycol (GLYCOLAX, MIRALAX) 17 g, oral, 2 times daily, 17 gram(s) orally 2 times a day as needed for constipation    sertraline (ZOLOFT) 75 mg, oral, Daily        Impression:   Patient is a 82-year-old female with symptomatic critical aortic stenosis, treatment is indicated.    -Patient will need a CT TAVR to better assess the aortic valve on the access  -Patient will also need a left heart cath  -Patient will also need an appointment with CT surgery    We discussed all the risks associated with the procedure, including but not limited to stroke, MI, pericardial tamponade, vascular complications, infection and death were discussed with the patient. The risk of needing a permament pacemaker was also discussed in detail. The patient verbalized understanding and decided to proceed with the procedure.     We will discuss this patient's case at our Valve Team meeting with representatives from Structural Heart and Cardiac Surgery. Our nurse navigators will contact patient with further diagnostic needs and formal plan.

## 2023-12-07 ENCOUNTER — LAB (OUTPATIENT)
Dept: LAB | Facility: LAB | Age: 83
End: 2023-12-07
Payer: MEDICARE

## 2023-12-07 DIAGNOSIS — I35.0 NONRHEUMATIC AORTIC VALVE STENOSIS: Primary | ICD-10-CM

## 2023-12-07 DIAGNOSIS — I35.0 NONRHEUMATIC AORTIC VALVE STENOSIS: ICD-10-CM

## 2023-12-07 LAB
ANION GAP SERPL CALC-SCNC: 13 MMOL/L (ref 10–20)
BUN SERPL-MCNC: 21 MG/DL (ref 6–23)
CALCIUM SERPL-MCNC: 9.3 MG/DL (ref 8.6–10.3)
CHLORIDE SERPL-SCNC: 101 MMOL/L (ref 98–107)
CO2 SERPL-SCNC: 31 MMOL/L (ref 21–32)
CREAT SERPL-MCNC: 0.73 MG/DL (ref 0.5–1.05)
GFR SERPL CREATININE-BSD FRML MDRD: 83 ML/MIN/1.73M*2
GLUCOSE SERPL-MCNC: 88 MG/DL (ref 74–99)
POTASSIUM SERPL-SCNC: 3.8 MMOL/L (ref 3.5–5.3)
SODIUM SERPL-SCNC: 141 MMOL/L (ref 136–145)

## 2023-12-07 PROCEDURE — 36415 COLL VENOUS BLD VENIPUNCTURE: CPT

## 2023-12-07 PROCEDURE — 80048 BASIC METABOLIC PNL TOTAL CA: CPT

## 2023-12-08 ENCOUNTER — APPOINTMENT (OUTPATIENT)
Dept: RADIOLOGY | Facility: HOSPITAL | Age: 83
End: 2023-12-08
Payer: MEDICARE

## 2023-12-08 DIAGNOSIS — I35.0 NONRHEUMATIC AORTIC VALVE STENOSIS: Primary | ICD-10-CM

## 2023-12-11 ENCOUNTER — OFFICE VISIT (OUTPATIENT)
Dept: CARDIAC SURGERY | Facility: HOSPITAL | Age: 83
End: 2023-12-11
Payer: MEDICARE

## 2023-12-11 ENCOUNTER — HOSPITAL ENCOUNTER (OUTPATIENT)
Dept: RADIOLOGY | Facility: HOSPITAL | Age: 83
Discharge: HOME | End: 2023-12-11
Payer: MEDICARE

## 2023-12-11 VITALS
HEART RATE: 62 BPM | DIASTOLIC BLOOD PRESSURE: 77 MMHG | OXYGEN SATURATION: 98 % | BODY MASS INDEX: 28.12 KG/M2 | SYSTOLIC BLOOD PRESSURE: 133 MMHG | HEIGHT: 62 IN | WEIGHT: 152.8 LBS

## 2023-12-11 DIAGNOSIS — I35.0 NONRHEUMATIC AORTIC VALVE STENOSIS: ICD-10-CM

## 2023-12-11 DIAGNOSIS — I35.0 NONRHEUMATIC AORTIC VALVE STENOSIS: Primary | Chronic | ICD-10-CM

## 2023-12-11 PROCEDURE — 2550000001 HC RX 255 CONTRASTS: Performed by: INTERNAL MEDICINE

## 2023-12-11 PROCEDURE — 3075F SYST BP GE 130 - 139MM HG: CPT | Performed by: THORACIC SURGERY (CARDIOTHORACIC VASCULAR SURGERY)

## 2023-12-11 PROCEDURE — 1036F TOBACCO NON-USER: CPT | Performed by: THORACIC SURGERY (CARDIOTHORACIC VASCULAR SURGERY)

## 2023-12-11 PROCEDURE — 1160F RVW MEDS BY RX/DR IN RCRD: CPT | Performed by: THORACIC SURGERY (CARDIOTHORACIC VASCULAR SURGERY)

## 2023-12-11 PROCEDURE — 99205 OFFICE O/P NEW HI 60 MIN: CPT | Performed by: THORACIC SURGERY (CARDIOTHORACIC VASCULAR SURGERY)

## 2023-12-11 PROCEDURE — 1126F AMNT PAIN NOTED NONE PRSNT: CPT | Performed by: THORACIC SURGERY (CARDIOTHORACIC VASCULAR SURGERY)

## 2023-12-11 PROCEDURE — 99215 OFFICE O/P EST HI 40 MIN: CPT | Mod: GC | Performed by: THORACIC SURGERY (CARDIOTHORACIC VASCULAR SURGERY)

## 2023-12-11 PROCEDURE — 1159F MED LIST DOCD IN RCRD: CPT | Performed by: THORACIC SURGERY (CARDIOTHORACIC VASCULAR SURGERY)

## 2023-12-11 PROCEDURE — 74174 CTA ABD&PLVS W/CONTRAST: CPT | Performed by: RADIOLOGY

## 2023-12-11 PROCEDURE — 71275 CT ANGIOGRAPHY CHEST: CPT

## 2023-12-11 PROCEDURE — 71275 CT ANGIOGRAPHY CHEST: CPT | Performed by: RADIOLOGY

## 2023-12-11 PROCEDURE — 3078F DIAST BP <80 MM HG: CPT | Performed by: THORACIC SURGERY (CARDIOTHORACIC VASCULAR SURGERY)

## 2023-12-11 RX ADMIN — IOHEXOL 80 ML: 350 INJECTION, SOLUTION INTRAVENOUS at 10:31

## 2023-12-11 ASSESSMENT — PAIN SCALES - GENERAL: PAINLEVEL: 0-NO PAIN

## 2023-12-11 NOTE — PROGRESS NOTES
Cardio:Kaz         HPI: Patient is a 89-year-old female with past medical history of hypertension hyperlipidemia mild mitral stenosis was referred to us for severe aortic valve stenosis.  On my interview today patient endorses dyspnea with exertion for example while doing her yard work which is a change compared to past.   patient also endorses shooting pain in the chest which is intermittent which she thinks may be chest discomfort.  Patient states that his symptoms are progressive.           ROS:     Constitutional: fatigue  Eyes: no eyesight problems, no blurred vision, no diplopia, no eye pain, no purulent discharge from the eyes, eyes not red, no dryness of the eyes and no itching of the eyes.   ENT: no nosebleeds, no hearing loss, no tinnitus, no earache, no sore throat, no hoarseness, no swollen glands in the neck and no nasal discharge.   Cardiovascular: dyspnea on exertion, no chest pain  Respiratory: no chronic cough, not coughing up sputum, no wheezing that is consistent with asthma  Gastrointestinal: no change in bowel habits, no blood in stools, no diarrhea, no constipation, no nausea, no vomiting, no abdominal pain, no signs and symptoms of ulcer disease, no fan colored stools and no intolerance to fatty foods.   Genitourinary: no hematuria,  no urinary frequency, no dysuria, no incontinence, no burning sensation during urination, no urinary hesitancy, no nocturia, no genital lesion, no testicular pain, urinary stream is not smaller and urinary stream does not start and stop.   Musculoskeletal: no arthralgias, no myalgias, no joint swelling, no joint stiffness, no muscle weakness, no back pain, no limb pain, no limb swelling and no difficulty walking.   Skin: no skin rashes, no change in skin color and pigmentation, no skin lesions and no skin lumps.   Neurological: no seizures, no frequent falls, no headaches, no dizziness, no tingling, no fainting and no limb weakness.   Psychiatric: no depression,  "not suicidal, no confusion, no memory lapses or loss, no anxiety, no personality change and no emotional problems.   Endocrine: no goiter, no thyroid disorder, no diabetes mellitus, no excessive thirst, no dry skin, no cold intolerance, no heat intolerance, no erectile dysfunction, no increased urinary frequency, no proptosis and no deepening of the voice.   Hematologic/Lymphatic: no bleeding issues.   All other systems have been reviewed and are negative for complaint.         TAVR Workup:   - NYHA: II  - Frailty: 1/5       - EKG: Not available  - TTE: 10/16/2023 EF 60 to 65%, mean gradient of aortic valve 55 mmHg aortic valve V-max 4.9 m/s aortic valve area 0.8 cm², mild mitral valve stenosis mean gradient 2.6 mmHg, mild to moderate aortic valve regurgitation  - CT TAVR: Pending  - LHC: Pending  - dental clearance: Pending     - STS  Procedure Type: Isolated AVR  Perioperative Outcome           Estimate %  Operative Mortality     3.43%  Morbidity & Mortality 9.19%  Stroke  1.41%  Renal Failure    1.37%  Reoperation     3.76%  Prolonged Ventilation 4.18%  Deep Sternal Wound Infection            0.047%  Long Hospital Stay (>14 days)            3.77%  Short Hospital Stay (<6 days)*            41.6%     Physical Exam:  Phone visit             7/1/2021    11:10 AM 12/17/2021    12:20 PM 5/26/2022     1:38 PM 7/21/2022     1:32 PM 4/5/2023     1:21 PM 8/30/2023    10:32 AM 11/21/2023    10:29 AM   Vitals   Systolic 128 124 120 130 120 128 118   Diastolic 76 76 60 78 70 80 68   Heart Rate   64 75 92 69 78 68   Temp 36.2 °C (97.2 °F) 36.3 °C (97.3 °F) 36.4 °C (97.5 °F)   36.4 °C (97.5 °F) 36.6 °C (97.9 °F)     Resp     16 18 18       Height (in) 1.549 m (5' 1\")   1.549 m (5' 1\")     1.524 m (5')     Weight (lb) 170.25 169.25 165 163 163 155 154   BMI 32.17 kg/m2 31.98 kg/m2 31.18 kg/m2 30.8 kg/m2 30.8 kg/m2 30.27 kg/m2 30.08 kg/m2   BSA (m2) 1.82 m2 1.82 m2 1.79 m2 1.78 m2 1.78 m2 1.73 m2 1.72 m2   Visit Report         " Report Report Report              Current Outpatient Medications   Medication Instructions    acetaminophen (Tylenol) 325 mg tablet oral, Every 6 hours, 2 tab(s) orally every 6 hours-continue routinely around the clock for next 72 hours then decrease to as needed for mild pain or temperature    aspirin 81 mg, oral, Daily    busPIRone (BUSPAR) 10 mg, oral, 2 times daily    cholecalciferol (VITAMIN D-3) 25 mcg, oral, Daily    fluticasone (Flonase) 50 mcg/actuation nasal spray 1 spray, Each Nostril, Daily    hydroCHLOROthiazide (HYDRODIURIL) 25 mg, oral, Daily    ipratropium (Atrovent) 42 mcg (0.06 %) nasal spray 2 sprays, Each Nostril, 3 times daily PRN    levothyroxine (Synthroid, Levoxyl) 50 mcg tablet TAKE ONE TABLET DAILY MONDAY THROUGH FRIDAY AND 2 TABLETS ON SATURDAY AND SUNDAY    lovastatin (Mevacor) 40 mg tablet TAKE 1 TABLET EVERY DAY AT DINNER    omeprazole (PRILOSEC) 20 mg, oral, Daily    polyethylene glycol (GLYCOLAX, MIRALAX) 17 g, oral, 2 times daily, 17 gram(s) orally 2 times a day as needed for constipation    sertraline (ZOLOFT) 75 mg, oral, Daily         Impression:   Patient is a 82-year-old female with symptomatic critical aortic stenosis, treatment is indicated.     -Patient will need a CT TAVR to better assess the aortic valve on the access  -Patient will also need a left heart cath     We discussed all the risks associated with the procedure, including but not limited to stroke, MI, pericardial tamponade, vascular complications, infection and death were discussed with the patient. The risk of needing a permament pacemaker was also discussed in detail. The patient verbalized understanding and decided to proceed with the procedure.      We will discuss this patient's case at our Valve Team meeting with representatives from [facility] Card[facility]. Our nurse navigators will contact patient with further diagnostic needs and formal plan.

## 2024-01-11 ENCOUNTER — LAB (OUTPATIENT)
Dept: LAB | Facility: LAB | Age: 84
End: 2024-01-11
Payer: MEDICARE

## 2024-01-11 DIAGNOSIS — I35.0 AORTIC STENOSIS: Chronic | ICD-10-CM

## 2024-01-11 LAB
ALBUMIN SERPL BCP-MCNC: 3.9 G/DL (ref 3.4–5)
ALP SERPL-CCNC: 59 U/L (ref 33–136)
ALT SERPL W P-5'-P-CCNC: 14 U/L (ref 7–45)
ANION GAP SERPL CALC-SCNC: 10 MMOL/L (ref 10–20)
AST SERPL W P-5'-P-CCNC: 22 U/L (ref 9–39)
BASOPHILS # BLD AUTO: 0.02 X10*3/UL (ref 0–0.1)
BASOPHILS NFR BLD AUTO: 0.6 %
BILIRUB SERPL-MCNC: 0.4 MG/DL (ref 0–1.2)
BUN SERPL-MCNC: 22 MG/DL (ref 6–23)
CALCIUM SERPL-MCNC: 9.7 MG/DL (ref 8.6–10.3)
CHLORIDE SERPL-SCNC: 102 MMOL/L (ref 98–107)
CO2 SERPL-SCNC: 34 MMOL/L (ref 21–32)
CREAT SERPL-MCNC: 0.74 MG/DL (ref 0.5–1.05)
EGFRCR SERPLBLD CKD-EPI 2021: 81 ML/MIN/1.73M*2
EOSINOPHIL # BLD AUTO: 0.08 X10*3/UL (ref 0–0.4)
EOSINOPHIL NFR BLD AUTO: 2.4 %
ERYTHROCYTE [DISTWIDTH] IN BLOOD BY AUTOMATED COUNT: 12.9 % (ref 11.5–14.5)
GLUCOSE SERPL-MCNC: 101 MG/DL (ref 74–99)
HCT VFR BLD AUTO: 37.9 % (ref 36–46)
HGB BLD-MCNC: 12.5 G/DL (ref 12–16)
IMM GRANULOCYTES # BLD AUTO: 0.01 X10*3/UL (ref 0–0.5)
IMM GRANULOCYTES NFR BLD AUTO: 0.3 % (ref 0–0.9)
INR PPP: 1 (ref 0.9–1.1)
LYMPHOCYTES # BLD AUTO: 1.59 X10*3/UL (ref 0.8–3)
LYMPHOCYTES NFR BLD AUTO: 48.2 %
MCH RBC QN AUTO: 30.3 PG (ref 26–34)
MCHC RBC AUTO-ENTMCNC: 33 G/DL (ref 32–36)
MCV RBC AUTO: 92 FL (ref 80–100)
MONOCYTES # BLD AUTO: 0.5 X10*3/UL (ref 0.05–0.8)
MONOCYTES NFR BLD AUTO: 15.2 %
NEUTROPHILS # BLD AUTO: 1.1 X10*3/UL (ref 1.6–5.5)
NEUTROPHILS NFR BLD AUTO: 33.3 %
NRBC BLD-RTO: 0 /100 WBCS (ref 0–0)
PLATELET # BLD AUTO: 157 X10*3/UL (ref 150–450)
POTASSIUM SERPL-SCNC: 5 MMOL/L (ref 3.5–5.3)
PROT SERPL-MCNC: 7.2 G/DL (ref 6.4–8.2)
PROTHROMBIN TIME: 11.8 SECONDS (ref 9.8–12.8)
RBC # BLD AUTO: 4.12 X10*6/UL (ref 4–5.2)
SODIUM SERPL-SCNC: 141 MMOL/L (ref 136–145)
WBC # BLD AUTO: 3.3 X10*3/UL (ref 4.4–11.3)

## 2024-01-11 PROCEDURE — 85025 COMPLETE CBC W/AUTO DIFF WBC: CPT

## 2024-01-11 PROCEDURE — 85610 PROTHROMBIN TIME: CPT

## 2024-01-11 PROCEDURE — 80053 COMPREHEN METABOLIC PANEL: CPT

## 2024-01-11 PROCEDURE — 36415 COLL VENOUS BLD VENIPUNCTURE: CPT

## 2024-01-15 ENCOUNTER — OFFICE VISIT (OUTPATIENT)
Dept: PRIMARY CARE | Facility: CLINIC | Age: 84
End: 2024-01-15
Payer: MEDICARE

## 2024-01-15 VITALS
SYSTOLIC BLOOD PRESSURE: 126 MMHG | BODY MASS INDEX: 28.61 KG/M2 | DIASTOLIC BLOOD PRESSURE: 74 MMHG | TEMPERATURE: 98 F | WEIGHT: 156.4 LBS

## 2024-01-15 DIAGNOSIS — I35.0 NONRHEUMATIC AORTIC VALVE STENOSIS: Primary | Chronic | ICD-10-CM

## 2024-01-15 DIAGNOSIS — I10 PRIMARY HYPERTENSION: Chronic | ICD-10-CM

## 2024-01-15 DIAGNOSIS — E06.3 HYPOTHYROIDISM DUE TO HASHIMOTO'S THYROIDITIS: ICD-10-CM

## 2024-01-15 DIAGNOSIS — E03.8 HYPOTHYROIDISM DUE TO HASHIMOTO'S THYROIDITIS: ICD-10-CM

## 2024-01-15 PROCEDURE — 1159F MED LIST DOCD IN RCRD: CPT | Performed by: FAMILY MEDICINE

## 2024-01-15 PROCEDURE — 1125F AMNT PAIN NOTED PAIN PRSNT: CPT | Performed by: FAMILY MEDICINE

## 2024-01-15 PROCEDURE — 3074F SYST BP LT 130 MM HG: CPT | Performed by: FAMILY MEDICINE

## 2024-01-15 PROCEDURE — 99213 OFFICE O/P EST LOW 20 MIN: CPT | Performed by: FAMILY MEDICINE

## 2024-01-15 PROCEDURE — 1160F RVW MEDS BY RX/DR IN RCRD: CPT | Performed by: FAMILY MEDICINE

## 2024-01-15 PROCEDURE — 1036F TOBACCO NON-USER: CPT | Performed by: FAMILY MEDICINE

## 2024-01-15 PROCEDURE — 3078F DIAST BP <80 MM HG: CPT | Performed by: FAMILY MEDICINE

## 2024-01-15 ASSESSMENT — PAIN SCALES - GENERAL: PAINLEVEL: 2

## 2024-01-15 ASSESSMENT — ENCOUNTER SYMPTOMS: DEPRESSION: 1

## 2024-01-16 ENCOUNTER — HOSPITAL ENCOUNTER (OUTPATIENT)
Facility: HOSPITAL | Age: 84
Setting detail: OUTPATIENT SURGERY
Discharge: HOME | End: 2024-01-16
Attending: STUDENT IN AN ORGANIZED HEALTH CARE EDUCATION/TRAINING PROGRAM | Admitting: STUDENT IN AN ORGANIZED HEALTH CARE EDUCATION/TRAINING PROGRAM
Payer: MEDICARE

## 2024-01-16 ENCOUNTER — APPOINTMENT (OUTPATIENT)
Dept: CARDIOLOGY | Facility: HOSPITAL | Age: 84
End: 2024-01-16
Payer: MEDICARE

## 2024-01-16 DIAGNOSIS — I35.0 AORTIC STENOSIS: Primary | ICD-10-CM

## 2024-01-16 DIAGNOSIS — R06.02 SHORTNESS OF BREATH: ICD-10-CM

## 2024-01-16 LAB
BASE EXCESS BLDA CALC-SCNC: 4.2 MMOL/L (ref -2–3)
BASE EXCESS BLDMV CALC-SCNC: 6.5 MMOL/L (ref -2–3)
BASE EXCESS BLDV CALC-SCNC: 3.4 MMOL/L (ref -2–3)
BASE EXCESS BLDV CALC-SCNC: 4.9 MMOL/L (ref -2–3)
BODY TEMPERATURE: 37 DEGREES CELSIUS
HCO3 BLDA-SCNC: 29.2 MMOL/L (ref 22–26)
HCO3 BLDMV-SCNC: 32.8 MMOL/L (ref 22–26)
HCO3 BLDV-SCNC: 29.6 MMOL/L (ref 22–26)
HCO3 BLDV-SCNC: 31.4 MMOL/L (ref 22–26)
INHALED O2 CONCENTRATION: 21 %
OXYHGB MFR BLDA: 94.5 % (ref 94–98)
OXYHGB MFR BLDMV: 65.7 % (ref 45–75)
OXYHGB MFR BLDV: 66.3 % (ref 45–75)
OXYHGB MFR BLDV: 70.1 % (ref 45–75)
PCO2 BLDA: 44 MM HG (ref 38–42)
PCO2 BLDMV: 53 MM HG (ref 41–51)
PCO2 BLDV: 50 MM HG (ref 41–51)
PCO2 BLDV: 53 MM HG (ref 41–51)
PH BLDA: 7.43 PH (ref 7.38–7.42)
PH BLDMV: 7.4 PH (ref 7.33–7.43)
PH BLDV: 7.38 PH (ref 7.33–7.43)
PH BLDV: 7.38 PH (ref 7.33–7.43)
PO2 BLDA: 82 MM HG (ref 85–95)
PO2 BLDMV: 40 MM HG (ref 35–45)
PO2 BLDV: 43 MM HG (ref 35–45)
PO2 BLDV: 45 MM HG (ref 35–45)
SAO2 % BLDA: 97 % (ref 94–100)
SAO2 % BLDMV: 67 % (ref 45–75)
SAO2 % BLDV: 68 % (ref 45–75)
SAO2 % BLDV: 72 % (ref 45–75)
SITE OF ARTERIAL PUNCTURE: ABNORMAL
TEST COMMENT: ABNORMAL
TEST COMMENT: ABNORMAL

## 2024-01-16 PROCEDURE — 93456 R HRT CORONARY ARTERY ANGIO: CPT | Performed by: STUDENT IN AN ORGANIZED HEALTH CARE EDUCATION/TRAINING PROGRAM

## 2024-01-16 PROCEDURE — 2500000004 HC RX 250 GENERAL PHARMACY W/ HCPCS (ALT 636 FOR OP/ED): Performed by: NURSE PRACTITIONER

## 2024-01-16 PROCEDURE — 2550000001 HC RX 255 CONTRASTS: Performed by: STUDENT IN AN ORGANIZED HEALTH CARE EDUCATION/TRAINING PROGRAM

## 2024-01-16 PROCEDURE — 2500000005 HC RX 250 GENERAL PHARMACY W/O HCPCS: Performed by: STUDENT IN AN ORGANIZED HEALTH CARE EDUCATION/TRAINING PROGRAM

## 2024-01-16 PROCEDURE — 93005 ELECTROCARDIOGRAM TRACING: CPT

## 2024-01-16 PROCEDURE — 82805 BLOOD GASES W/O2 SATURATION: CPT

## 2024-01-16 PROCEDURE — 7100000010 HC PHASE TWO TIME - EACH INCREMENTAL 1 MINUTE: Performed by: STUDENT IN AN ORGANIZED HEALTH CARE EDUCATION/TRAINING PROGRAM

## 2024-01-16 PROCEDURE — 93010 ELECTROCARDIOGRAM REPORT: CPT | Performed by: INTERNAL MEDICINE

## 2024-01-16 PROCEDURE — 2720000007 HC OR 272 NO HCPCS: Performed by: STUDENT IN AN ORGANIZED HEALTH CARE EDUCATION/TRAINING PROGRAM

## 2024-01-16 PROCEDURE — 2500000004 HC RX 250 GENERAL PHARMACY W/ HCPCS (ALT 636 FOR OP/ED): Performed by: STUDENT IN AN ORGANIZED HEALTH CARE EDUCATION/TRAINING PROGRAM

## 2024-01-16 PROCEDURE — 99152 MOD SED SAME PHYS/QHP 5/>YRS: CPT | Performed by: STUDENT IN AN ORGANIZED HEALTH CARE EDUCATION/TRAINING PROGRAM

## 2024-01-16 PROCEDURE — 7100000009 HC PHASE TWO TIME - INITIAL BASE CHARGE: Performed by: STUDENT IN AN ORGANIZED HEALTH CARE EDUCATION/TRAINING PROGRAM

## 2024-01-16 PROCEDURE — C1894 INTRO/SHEATH, NON-LASER: HCPCS | Performed by: STUDENT IN AN ORGANIZED HEALTH CARE EDUCATION/TRAINING PROGRAM

## 2024-01-16 PROCEDURE — 99153 MOD SED SAME PHYS/QHP EA: CPT | Performed by: STUDENT IN AN ORGANIZED HEALTH CARE EDUCATION/TRAINING PROGRAM

## 2024-01-16 PROCEDURE — 82805 BLOOD GASES W/O2 SATURATION: CPT | Mod: MUE

## 2024-01-16 RX ORDER — LIDOCAINE HYDROCHLORIDE 20 MG/ML
INJECTION, SOLUTION INFILTRATION; PERINEURAL AS NEEDED
Status: DISCONTINUED | OUTPATIENT
Start: 2024-01-16 | End: 2024-01-16 | Stop reason: HOSPADM

## 2024-01-16 RX ORDER — SODIUM CHLORIDE 9 MG/ML
1 INJECTION, SOLUTION INTRAVENOUS CONTINUOUS
Status: CANCELLED | OUTPATIENT
Start: 2024-01-16 | End: 2024-01-16

## 2024-01-16 RX ORDER — SODIUM CHLORIDE 9 MG/ML
100 INJECTION, SOLUTION INTRAVENOUS CONTINUOUS
Status: DISCONTINUED | OUTPATIENT
Start: 2024-01-16 | End: 2024-01-18 | Stop reason: HOSPADM

## 2024-01-16 RX ORDER — HYDROCHLOROTHIAZIDE 25 MG/1
25 TABLET ORAL DAILY
COMMUNITY
End: 2024-01-18 | Stop reason: HOSPADM

## 2024-01-16 RX ORDER — SODIUM CHLORIDE 9 MG/ML
1000 INJECTION, SOLUTION INTRAVENOUS ONCE AS NEEDED
Status: CANCELLED | OUTPATIENT
Start: 2024-01-16

## 2024-01-16 RX ORDER — FENTANYL CITRATE 50 UG/ML
INJECTION, SOLUTION INTRAMUSCULAR; INTRAVENOUS AS NEEDED
Status: DISCONTINUED | OUTPATIENT
Start: 2024-01-16 | End: 2024-01-16 | Stop reason: HOSPADM

## 2024-01-16 RX ORDER — MIDAZOLAM HYDROCHLORIDE 1 MG/ML
INJECTION, SOLUTION INTRAMUSCULAR; INTRAVENOUS AS NEEDED
Status: DISCONTINUED | OUTPATIENT
Start: 2024-01-16 | End: 2024-01-16 | Stop reason: HOSPADM

## 2024-01-16 RX ORDER — ACETAMINOPHEN 325 MG/1
650 TABLET ORAL EVERY 6 HOURS PRN
Status: CANCELLED | OUTPATIENT
Start: 2024-01-16

## 2024-01-16 ASSESSMENT — COLUMBIA-SUICIDE SEVERITY RATING SCALE - C-SSRS
6. HAVE YOU EVER DONE ANYTHING, STARTED TO DO ANYTHING, OR PREPARED TO DO ANYTHING TO END YOUR LIFE?: NO
1. IN THE PAST MONTH, HAVE YOU WISHED YOU WERE DEAD OR WISHED YOU COULD GO TO SLEEP AND NOT WAKE UP?: NO
2. HAVE YOU ACTUALLY HAD ANY THOUGHTS OF KILLING YOURSELF?: NO

## 2024-01-16 NOTE — NURSING NOTE
Patient has been flat since procedure. She was sat up at 1610. Patient has no complains of pain. Her right femoral access site shows no sign of bleeding.

## 2024-01-16 NOTE — DISCHARGE INSTRUCTIONS

## 2024-01-16 NOTE — H&P
History and Physical   Pre Surgical Review (< 30 days)      History & Physical Reviewed    I have reviewed the History and Physical dated:  1/15/24   History and Physical reviewed and relevant findings noted. Patient examined to review pertinent physical  findings.: No significant changes   Home Medications Reviewed: no changes noted   Allergies Reviewed: no changes noted      Home Medications  Current Outpatient Medications   Medication Instructions    acetaminophen (Tylenol) 325 mg tablet oral, Every 6 hours, 2 tab(s) orally every 6 hours-continue routinely around the clock for next 72 hours then decrease to as needed for mild pain or temperature    aspirin 81 mg, oral, Daily    busPIRone (BUSPAR) 10 mg, oral, 2 times daily    cholecalciferol (VITAMIN D-3) 25 mcg, oral, Daily    fluticasone (Flonase) 50 mcg/actuation nasal spray 1 spray, Each Nostril, Daily    hydroCHLOROthiazide (HYDRODIURIL) 25 mg, oral, Daily    ipratropium (Atrovent) 42 mcg (0.06 %) nasal spray 2 sprays, Each Nostril, 3 times daily PRN    levothyroxine (Synthroid, Levoxyl) 50 mcg tablet TAKE ONE TABLET DAILY MONDAY THROUGH FRIDAY AND 2 TABLETS ON SATURDAY AND SUNDAY    lovastatin (Mevacor) 40 mg tablet TAKE 1 TABLET EVERY DAY AT DINNER    omeprazole (PRILOSEC) 20 mg, oral, Daily    polyethylene glycol (GLYCOLAX, MIRALAX) 17 g, oral, 2 times daily, 17 gram(s) orally 2 times a day as needed for constipation    sertraline (ZOLOFT) 75 mg, oral, Daily        Allergies  Allergies   Allergen Reactions    Aspirin Unknown    Prednisone Unknown    Sulfamethoxazole-Trimethoprim Unknown       Physical Exam  Physical Exam  Constitutional:       General: She is not in acute distress.  Cardiovascular:      Rate and Rhythm: Normal rate and regular rhythm.      Heart sounds: Murmur heard.   Pulmonary:      Effort: Pulmonary effort is normal. No respiratory distress.   Abdominal:      General: Bowel sounds are normal.   Skin:     General: Skin is  warm and dry.   Neurological:      General: No focal deficit present.      Mental Status: She is alert and oriented to person, place, and time.   Psychiatric:         Mood and Affect: Mood normal.         Behavior: Behavior normal.          Airway/Sedation Assessment     Assessment by anesthesia N/A     ·  Mouth Opening OK yes      Neck Flexibility OK yes      Loose Teeth No   ·  Oropharyngeal Classification Grade II   ·  ASA PS Classification ASA II - Patient with mild systemic disease      Sedation Plan Moderate     Risks, benefits, and alternatives discussed with patient.     ERAS (Enhanced Recovery After Surgery):  ·  ERAS Patient: No      Consent:   COVID-19 Consent:  ·  COVID-19 Risk Consent Surgeon has reviewed key risks related to the risk of emma COVID-19 and if they contract COVID-19 what the risks are.     Lise Marks, APRN-CNP

## 2024-01-16 NOTE — PROGRESS NOTES
Subjective   Patient ID: Ayo Bueno is a 83 y.o. female who presents for No chief complaint on file..    HPI   Patient for follow-up.  She is scheduled to have a cardiac cath tomorrow and then subsequently will be having a TAVR procedure.  Patient is accompanied by her daughter.  Overall things have been doing well.  She has no complaints.  Review of Systems    Objective   /74 (BP Location: Left arm, Patient Position: Sitting, BP Cuff Size: Adult)   Temp 36.7 °C (98 °F) (Temporal)   Wt 70.9 kg (156 lb 6.4 oz)   BMI 28.61 kg/m²     Physical Exam  Alert, pleasant and in no acute distress.  Heart: Regular rate and rhythm 4/6 murmur  Lungs: Clear to auscultation  Lower extremities: No edema  Assessment/Plan   Problem List Items Addressed This Visit             ICD-10-CM       Cardiac and Vasculature    Hypertension (Chronic) I10    Aortic stenosis - Primary (Chronic) I35.0       Endocrine/Metabolic    Hypothyroidism E03.9     Patient here for follow-up.  We reviewed her previous labs all of which are stable.  We reviewed cardiac catheterization as well as the TAVR procedure.  Plan follow-up in 4 months

## 2024-01-16 NOTE — NURSING NOTE
Discharge instructions given to patient and daughter, verbalized understanding.  Ambulated on unit before discharge, groin stable.  Voided.

## 2024-01-19 LAB
ATRIAL RATE: 72 BPM
P AXIS: -28 DEGREES
P OFFSET: 204 MS
P ONSET: 150 MS
PR INTERVAL: 160 MS
Q ONSET: 230 MS
QRS COUNT: 12 BEATS
QRS DURATION: 84 MS
QT INTERVAL: 444 MS
QTC CALCULATION(BAZETT): 486 MS
QTC FREDERICIA: 472 MS
R AXIS: -12 DEGREES
T AXIS: 27 DEGREES
T OFFSET: 452 MS
VENTRICULAR RATE: 72 BPM

## 2024-01-22 ENCOUNTER — CARDIOLOGY CONFERENCE (OUTPATIENT)
Dept: CARDIOLOGY | Facility: HOSPITAL | Age: 84
End: 2024-01-22
Payer: MEDICARE

## 2024-01-22 DIAGNOSIS — I35.0 NONRHEUMATIC AORTIC VALVE STENOSIS: Primary | ICD-10-CM

## 2024-01-23 ENCOUNTER — TELEPHONE (OUTPATIENT)
Dept: CARDIOLOGY | Facility: HOSPITAL | Age: 84
End: 2024-01-23
Payer: MEDICARE

## 2024-01-23 NOTE — TELEPHONE ENCOUNTER
[facility]/WALK  Questionnaire      1  Heart failure affects different people in different ways. Some feel shortness of breath while others feel fatigue. Please indicate how much you are limited by heart failure (shortness of breath or fatigue) in your ability to do the following activities over the past 2 weeks.    A.) Showering/bathing  5. Not at All  B.) Walking 1 block on level ground 4. Slightly  C.) Hurrying or Jogging   6. Limited for other reastons    2.  Over the past 2 weeks, how many times did you have swelling in your feet, ankles or legs when you woke up in the morning? 5. Never    3.  Over the past 2 weeks, on average, how many times has fatigue limited your ability to do what you wanted? 3. At least once a day    4.  Over the past 2 weeks, on average, how many times has shortness of breath limited your ability to do what you wanted? 7. Never    5.  Over the past 2 weeks, on average, how many times have you been forced to sleep sitting up in a chair or with at least 3 pillows to prop you up because of shortness of breath? Never    6. Over the past 2 weeks, how much has your heart failure limited your enjoyment of life? It has slightly limited my enjoyment of life    7. If you had to spend the rest of your life with your heart failure the way it is right now, how would you feel about this? 3. Somewhat satisfied    8. How much does your heart failure affect your lifestyle? Please indicate how your heart failure may have limited yourparticipation in the following activities over the past 2 weeks    A.)  Hobbies, recreational activities  2. Limited quite a bit    B.) Working or doing household chores  3. Moderately limited    C.) Visiting family or friends out of your home  3. Moderately limited      5 meter walk test 12.2 seconds

## 2024-01-26 ENCOUNTER — LAB (OUTPATIENT)
Dept: LAB | Facility: LAB | Age: 84
End: 2024-01-26
Payer: MEDICARE

## 2024-01-26 DIAGNOSIS — I35.0 NONRHEUMATIC AORTIC VALVE STENOSIS: ICD-10-CM

## 2024-01-26 LAB
ALBUMIN SERPL BCP-MCNC: 4.2 G/DL (ref 3.4–5)
ANION GAP SERPL CALC-SCNC: 11 MMOL/L (ref 10–20)
BUN SERPL-MCNC: 22 MG/DL (ref 6–23)
CALCIUM SERPL-MCNC: 9.7 MG/DL (ref 8.6–10.3)
CHLORIDE SERPL-SCNC: 100 MMOL/L (ref 98–107)
CO2 SERPL-SCNC: 34 MMOL/L (ref 21–32)
CREAT SERPL-MCNC: 0.67 MG/DL (ref 0.5–1.05)
EGFRCR SERPLBLD CKD-EPI 2021: 88 ML/MIN/1.73M*2
GLUCOSE SERPL-MCNC: 87 MG/DL (ref 74–99)
PHOSPHATE SERPL-MCNC: 4.6 MG/DL (ref 2.5–4.9)
POTASSIUM SERPL-SCNC: 4.8 MMOL/L (ref 3.5–5.3)
SODIUM SERPL-SCNC: 140 MMOL/L (ref 136–145)

## 2024-01-26 PROCEDURE — 36415 COLL VENOUS BLD VENIPUNCTURE: CPT

## 2024-01-26 PROCEDURE — 80069 RENAL FUNCTION PANEL: CPT

## 2024-01-29 PROBLEM — Z95.2 S/P TAVR (TRANSCATHETER AORTIC VALVE REPLACEMENT): Status: ACTIVE | Noted: 2024-01-29

## 2024-01-29 NOTE — DISCHARGE INSTRUCTIONS
No changes in medications    ####  POST VALVE PROCEDURE DISCHARGE INSTRUCTIONS   ####    - Please weigh yourself daily (first thing in the morning) and record reading  - Please take and record your blood pressure 2 times a day (at least 60-90 mins AFTER you take your meds)  PLEASE HAVE THESE READINGS AVAILABLE DURING YOUR FOLLOW-UP APPOINTMENTS!!    - NO DRIVING OR LIFTING/PULLING/PUSHING GREATER THAN 10 POUNDS FOR 1 WEEK FROM YOUR PROCEDURE DATE.    - A lab requisition has been provided for you to draw a CBC, BMP, and PT/INR.  Please take this rec to your local lab to have your labs drawn between 4-7 days post discharge.     - You have been given the order requisition for the 1 month ECHO at the time of your discharge.  Please call and schedule this ECHO at your LOCAL center (no sooner than 23 days after your procedure date).    - You will have 3 appointments that are vital to your post procedure care, these will be scheduled for you IF YOU NEED TO CHANGE YOUR APPOINTMENT TIME/DATE, PLEASE CALL [phone]   - Please follow up with your PCP within 7-14 days of discharge  - You will follow up with your primary cardiologist in 6-10 weeks    **** No elective dental procedures or cleanings for 3 months post procedure - You will need dental prophylaxis (oral antibiotic) prior to dental work/cleanings for life *****    Please call the STRUCTURAL HEART TEAM LINE if you have any questions or concerns - [phone]     ****   CALL PROVIDER IF:   ****  - Breathing faster than normal.   - Breathing harder than normal or having retractions.   - Fever of 100.4 F (38 C) or higher.   - Chills.   - Drinking less than normal.   - Urinating less than normal, over 1 day.   - Acting very sleepy and difficult to awaken.   - Vomiting (throwing up) and not able to eat or drink for 12 hours.   - 3 or more loose, watery bowel movements in 24 hours (diarrhea).    -Any new concerning symptoms.    - If you develop difficulty breathing,  rash, hives, severe nausea, vomiting, light-headedness or any signs of infection, immediately contact your doctor and go to the nearest emergency room.      #####   MISC. HOME-GOING INFO   #####  - DO NOT drink any alcoholic drinks or take any non-prescriptive medications that contain alcohol for the first 24 hours.   - DO NOT make any important decisions for the first 24 hours.      ACTIVITY:  - You are advised to go directly home from the hospital.   - DO NOT lift anything heavier than 10 pounds for one week, this allows for proper healing of the groin.   - No excessive exercise or treadmill use for one week. You may walk and do stairs, slowly.   - No sexual activities for 24 hours after you arrive home.      WOUND CARE:  - If slight bleeding should occur at site, lie down and have someone apply firm pressure just above the puncture site for 5 minutes.  If it continues or is profuse, call 911. Always notify your doctor if bleeding occurs.   - Keep site clean and dry. Let air dry or you may use a simple bandaid.   - Gently cleanse the puncture site in your groin with soap and water only.   - You may experience some tenderness, bruising or minimal inflammation.  If you have any concerns, you may contact the Cath Lab or if any of these symptoms become excessive, contact your cardiologist or go to the emergency room.   - No tub baths, soaking, or swimming for one week.   - May shower the next day after your procedure.      DIET:  - You may resume your normal diet. However, be mindful of your sodium intake.  Ideally you should try to limit your daily intake of sodium to 2-3g a day      HEART FAILURE SPECIFIC INSTRUCTIONS:   - CALL 911 IF YOU HAVE ANY OF THE SIGNS AND SYMPTOMS OF HEART FAILURE:   1. Chest pain   2. Significant Shortness of breath   3. Fainting.   - Notify your physician immediately if you have shortness of breath; weight gain of 3 lbs. or more; fatigue and loss of energy; swelling of lower extremities  or abdomen; dizziness or fainting; change of appetite; and frequent coughing.   - Daily weight on the same scale, same time after voiding and before eating.   - Maintain daily weight log.

## 2024-01-30 ENCOUNTER — APPOINTMENT (OUTPATIENT)
Dept: CARDIOLOGY | Facility: HOSPITAL | Age: 84
DRG: 267 | End: 2024-01-30
Payer: MEDICARE

## 2024-01-30 ENCOUNTER — HOSPITAL ENCOUNTER (INPATIENT)
Facility: HOSPITAL | Age: 84
LOS: 1 days | Discharge: HOME | DRG: 267 | End: 2024-01-31
Attending: INTERNAL MEDICINE | Admitting: INTERNAL MEDICINE
Payer: MEDICARE

## 2024-01-30 ENCOUNTER — APPOINTMENT (OUTPATIENT)
Dept: RADIOLOGY | Facility: HOSPITAL | Age: 84
DRG: 267 | End: 2024-01-30
Payer: MEDICARE

## 2024-01-30 DIAGNOSIS — Z95.2 S/P TAVR (TRANSCATHETER AORTIC VALVE REPLACEMENT): Primary | ICD-10-CM

## 2024-01-30 DIAGNOSIS — I35.0 NONRHEUMATIC AORTIC VALVE STENOSIS: ICD-10-CM

## 2024-01-30 DIAGNOSIS — I44.2 CHB (COMPLETE HEART BLOCK) (MULTI): ICD-10-CM

## 2024-01-30 LAB
ANION GAP SERPL CALC-SCNC: 11 MMOL/L (ref 10–20)
AORTIC VALVE MEAN GRADIENT: 44 MMHG
AORTIC VALVE PEAK VELOCITY: 4.19 M/S
ATRIAL RATE: 74 BPM
AV PEAK GRADIENT: 70.2 MMHG
AVA (PEAK VEL): 0.81 CM2
AVA (VTI): 0.87 CM2
BASOPHILS # BLD AUTO: 0.02 X10*3/UL (ref 0–0.1)
BASOPHILS NFR BLD AUTO: 0.6 %
BUN SERPL-MCNC: 21 MG/DL (ref 6–23)
CALCIUM SERPL-MCNC: 8.8 MG/DL (ref 8.6–10.6)
CHLORIDE SERPL-SCNC: 103 MMOL/L (ref 98–107)
CHOLEST SERPL-MCNC: 131 MG/DL (ref 0–199)
CHOLESTEROL/HDL RATIO: 2.6
CO2 SERPL-SCNC: 30 MMOL/L (ref 21–32)
CREAT SERPL-MCNC: 0.7 MG/DL (ref 0.5–1.05)
EGFRCR SERPLBLD CKD-EPI 2021: 87 ML/MIN/1.73M*2
EJECTION FRACTION APICAL 4 CHAMBER: 62.2
EJECTION FRACTION: 68 %
EOSINOPHIL # BLD AUTO: 0.05 X10*3/UL (ref 0–0.4)
EOSINOPHIL NFR BLD AUTO: 1.4 %
ERYTHROCYTE [DISTWIDTH] IN BLOOD BY AUTOMATED COUNT: 13.2 % (ref 11.5–14.5)
GLUCOSE SERPL-MCNC: 127 MG/DL (ref 74–99)
HCT VFR BLD AUTO: 32.6 % (ref 36–46)
HDLC SERPL-MCNC: 51.1 MG/DL
HGB BLD-MCNC: 11 G/DL (ref 12–16)
IMM GRANULOCYTES # BLD AUTO: 0.02 X10*3/UL (ref 0–0.5)
IMM GRANULOCYTES NFR BLD AUTO: 0.6 % (ref 0–0.9)
INR PPP: 1.3 (ref 0.9–1.1)
LDLC SERPL CALC-MCNC: 68 MG/DL
LEFT ATRIUM VOLUME AREA LENGTH INDEX BSA: 43.2 ML/M2
LEFT VENTRICLE INTERNAL DIMENSION DIASTOLE: 5.15 CM (ref 3.5–6)
LEFT VENTRICULAR OUTFLOW TRACT DIAMETER: 2.1 CM
LYMPHOCYTES # BLD AUTO: 0.74 X10*3/UL (ref 0.8–3)
LYMPHOCYTES NFR BLD AUTO: 20.4 %
MAGNESIUM SERPL-MCNC: 1.78 MG/DL (ref 1.6–2.4)
MCH RBC QN AUTO: 31 PG (ref 26–34)
MCHC RBC AUTO-ENTMCNC: 33.7 G/DL (ref 32–36)
MCV RBC AUTO: 92 FL (ref 80–100)
MONOCYTES # BLD AUTO: 0.34 X10*3/UL (ref 0.05–0.8)
MONOCYTES NFR BLD AUTO: 9.4 %
NEUTROPHILS # BLD AUTO: 2.46 X10*3/UL (ref 1.6–5.5)
NEUTROPHILS NFR BLD AUTO: 67.6 %
NON HDL CHOLESTEROL: 80 MG/DL (ref 0–149)
NRBC BLD-RTO: 0 /100 WBCS (ref 0–0)
PLATELET # BLD AUTO: 125 X10*3/UL (ref 150–450)
POTASSIUM SERPL-SCNC: 3.5 MMOL/L (ref 3.5–5.3)
PROTHROMBIN TIME: 14.8 SECONDS (ref 9.8–12.8)
Q ONSET: 195 MS
QRS COUNT: 12 BEATS
QRS DURATION: 160 MS
QT INTERVAL: 506 MS
QTC CALCULATION(BAZETT): 546 MS
QTC FREDERICIA: 532 MS
R AXIS: -81 DEGREES
RBC # BLD AUTO: 3.55 X10*6/UL (ref 4–5.2)
SODIUM SERPL-SCNC: 140 MMOL/L (ref 136–145)
T AXIS: 73 DEGREES
T OFFSET: 448 MS
TRIGL SERPL-MCNC: 59 MG/DL (ref 0–149)
TSH SERPL-ACNC: 1.61 MIU/L (ref 0.44–3.98)
VENTRICULAR RATE: 70 BPM
VLDL: 12 MG/DL (ref 0–40)
WBC # BLD AUTO: 3.6 X10*3/UL (ref 4.4–11.3)

## 2024-01-30 PROCEDURE — 93005 ELECTROCARDIOGRAM TRACING: CPT

## 2024-01-30 PROCEDURE — 85025 COMPLETE CBC W/AUTO DIFF WBC: CPT | Performed by: NURSE PRACTITIONER

## 2024-01-30 PROCEDURE — 2500000005 HC RX 250 GENERAL PHARMACY W/O HCPCS: Performed by: INTERNAL MEDICINE

## 2024-01-30 PROCEDURE — 02RF38Z REPLACEMENT OF AORTIC VALVE WITH ZOOPLASTIC TISSUE, PERCUTANEOUS APPROACH: ICD-10-PCS | Performed by: THORACIC SURGERY (CARDIOTHORACIC VASCULAR SURGERY)

## 2024-01-30 PROCEDURE — C1760 CLOSURE DEV, VASC: HCPCS | Performed by: INTERNAL MEDICINE

## 2024-01-30 PROCEDURE — 99153 MOD SED SAME PHYS/QHP EA: CPT | Performed by: INTERNAL MEDICINE

## 2024-01-30 PROCEDURE — 80048 BASIC METABOLIC PNL TOTAL CA: CPT | Performed by: NURSE PRACTITIONER

## 2024-01-30 PROCEDURE — C1894 INTRO/SHEATH, NON-LASER: HCPCS | Performed by: INTERNAL MEDICINE

## 2024-01-30 PROCEDURE — 83735 ASSAY OF MAGNESIUM: CPT | Performed by: NURSE PRACTITIONER

## 2024-01-30 PROCEDURE — 7100000009 HC PHASE TWO TIME - INITIAL BASE CHARGE: Performed by: INTERNAL MEDICINE

## 2024-01-30 PROCEDURE — 93010 ELECTROCARDIOGRAM REPORT: CPT | Performed by: STUDENT IN AN ORGANIZED HEALTH CARE EDUCATION/TRAINING PROGRAM

## 2024-01-30 PROCEDURE — 93325 DOPPLER ECHO COLOR FLOW MAPG: CPT

## 2024-01-30 PROCEDURE — 2500000004 HC RX 250 GENERAL PHARMACY W/ HCPCS (ALT 636 FOR OP/ED): Performed by: NURSE PRACTITIONER

## 2024-01-30 PROCEDURE — 71045 X-RAY EXAM CHEST 1 VIEW: CPT | Performed by: RADIOLOGY

## 2024-01-30 PROCEDURE — 37799 UNLISTED PX VASCULAR SURGERY: CPT | Performed by: NURSE PRACTITIONER

## 2024-01-30 PROCEDURE — C1773 RET DEV, INSERTABLE: HCPCS | Performed by: INTERNAL MEDICINE

## 2024-01-30 PROCEDURE — 2720000007 HC OR 272 NO HCPCS: Performed by: INTERNAL MEDICINE

## 2024-01-30 PROCEDURE — 2780000003 HC OR 278 NO HCPCS: Performed by: INTERNAL MEDICINE

## 2024-01-30 PROCEDURE — C1889 IMPLANT/INSERT DEVICE, NOC: HCPCS | Performed by: INTERNAL MEDICINE

## 2024-01-30 PROCEDURE — C1769 GUIDE WIRE: HCPCS | Performed by: INTERNAL MEDICINE

## 2024-01-30 PROCEDURE — G0269 OCCLUSIVE DEVICE IN VEIN ART: HCPCS | Mod: TC,59 | Performed by: INTERNAL MEDICINE

## 2024-01-30 PROCEDURE — 33361 REPLACE AORTIC VALVE PERQ: CPT | Performed by: THORACIC SURGERY (CARDIOTHORACIC VASCULAR SURGERY)

## 2024-01-30 PROCEDURE — 2500000001 HC RX 250 WO HCPCS SELF ADMINISTERED DRUGS (ALT 637 FOR MEDICARE OP)

## 2024-01-30 PROCEDURE — 7100000010 HC PHASE TWO TIME - EACH INCREMENTAL 1 MINUTE: Performed by: INTERNAL MEDICINE

## 2024-01-30 PROCEDURE — 93308 TTE F-UP OR LMTD: CPT | Performed by: INTERNAL MEDICINE

## 2024-01-30 PROCEDURE — 2500000004 HC RX 250 GENERAL PHARMACY W/ HCPCS (ALT 636 FOR OP/ED): Performed by: INTERNAL MEDICINE

## 2024-01-30 PROCEDURE — 99152 MOD SED SAME PHYS/QHP 5/>YRS: CPT | Performed by: INTERNAL MEDICINE

## 2024-01-30 PROCEDURE — 82947 ASSAY GLUCOSE BLOOD QUANT: CPT

## 2024-01-30 PROCEDURE — C1725 CATH, TRANSLUMIN NON-LASER: HCPCS | Performed by: INTERNAL MEDICINE

## 2024-01-30 PROCEDURE — 2550000001 HC RX 255 CONTRASTS: Performed by: INTERNAL MEDICINE

## 2024-01-30 PROCEDURE — 2500000004 HC RX 250 GENERAL PHARMACY W/ HCPCS (ALT 636 FOR OP/ED)

## 2024-01-30 PROCEDURE — 84443 ASSAY THYROID STIM HORMONE: CPT

## 2024-01-30 PROCEDURE — C1892 INTRO/SHEATH,FIXED,PEEL-AWAY: HCPCS | Performed by: INTERNAL MEDICINE

## 2024-01-30 PROCEDURE — 71045 X-RAY EXAM CHEST 1 VIEW: CPT

## 2024-01-30 PROCEDURE — 93325 DOPPLER ECHO COLOR FLOW MAPG: CPT | Performed by: INTERNAL MEDICINE

## 2024-01-30 PROCEDURE — 85610 PROTHROMBIN TIME: CPT | Performed by: NURSE PRACTITIONER

## 2024-01-30 PROCEDURE — 1200000002 HC GENERAL ROOM WITH TELEMETRY DAILY

## 2024-01-30 PROCEDURE — 80061 LIPID PANEL: CPT

## 2024-01-30 PROCEDURE — 33361 REPLACE AORTIC VALVE PERQ: CPT | Performed by: INTERNAL MEDICINE

## 2024-01-30 PROCEDURE — 93321 DOPPLER ECHO F-UP/LMTD STD: CPT | Performed by: INTERNAL MEDICINE

## 2024-01-30 DEVICE — LOADING SYS L-EVOLUTFX-2329
Type: IMPLANTABLE DEVICE | Status: NON-FUNCTIONAL
Brand: EVOLUT™ FX

## 2024-01-30 DEVICE — VLV EVOLUTFX-29 COMM US
Type: IMPLANTABLE DEVICE | Site: AORTA | Status: FUNCTIONAL
Brand: EVOLUT™ FX

## 2024-01-30 RX ORDER — BUSPIRONE HYDROCHLORIDE 10 MG/1
10 TABLET ORAL 2 TIMES DAILY
Status: DISCONTINUED | OUTPATIENT
Start: 2024-01-30 | End: 2024-01-31 | Stop reason: HOSPADM

## 2024-01-30 RX ORDER — ENOXAPARIN SODIUM 100 MG/ML
40 INJECTION SUBCUTANEOUS EVERY 24 HOURS
Status: DISCONTINUED | OUTPATIENT
Start: 2024-01-31 | End: 2024-01-31 | Stop reason: HOSPADM

## 2024-01-30 RX ORDER — LIDOCAINE HYDROCHLORIDE 10 MG/ML
INJECTION, SOLUTION EPIDURAL; INFILTRATION; INTRACAUDAL; PERINEURAL AS NEEDED
Status: DISCONTINUED | OUTPATIENT
Start: 2024-01-30 | End: 2024-01-30 | Stop reason: HOSPADM

## 2024-01-30 RX ORDER — ACETAMINOPHEN 325 MG/1
650 TABLET ORAL EVERY 6 HOURS PRN
Status: DISCONTINUED | OUTPATIENT
Start: 2024-01-30 | End: 2024-01-31 | Stop reason: HOSPADM

## 2024-01-30 RX ORDER — PROCHLORPERAZINE 25 MG/1
25 SUPPOSITORY RECTAL EVERY 12 HOURS PRN
Status: DISCONTINUED | OUTPATIENT
Start: 2024-01-30 | End: 2024-01-30

## 2024-01-30 RX ORDER — TRAMADOL HYDROCHLORIDE 50 MG/1
50 TABLET ORAL EVERY 6 HOURS PRN
Status: DISCONTINUED | OUTPATIENT
Start: 2024-01-30 | End: 2024-01-30

## 2024-01-30 RX ORDER — POTASSIUM CHLORIDE 29.8 MG/ML
INJECTION INTRAVENOUS
Status: COMPLETED
Start: 2024-01-30 | End: 2024-01-30

## 2024-01-30 RX ORDER — ASPIRIN 81 MG/1
81 TABLET ORAL DAILY
Status: DISCONTINUED | OUTPATIENT
Start: 2024-01-30 | End: 2024-01-31 | Stop reason: HOSPADM

## 2024-01-30 RX ORDER — MIDAZOLAM HYDROCHLORIDE 1 MG/ML
INJECTION INTRAMUSCULAR; INTRAVENOUS AS NEEDED
Status: DISCONTINUED | OUTPATIENT
Start: 2024-01-30 | End: 2024-01-30 | Stop reason: HOSPADM

## 2024-01-30 RX ORDER — HEPARIN SODIUM 1000 [USP'U]/ML
INJECTION, SOLUTION INTRAVENOUS; SUBCUTANEOUS AS NEEDED
Status: DISCONTINUED | OUTPATIENT
Start: 2024-01-30 | End: 2024-01-30 | Stop reason: HOSPADM

## 2024-01-30 RX ORDER — LEVOTHYROXINE SODIUM 50 UG/1
50 TABLET ORAL DAILY
Status: DISCONTINUED | OUTPATIENT
Start: 2024-01-30 | End: 2024-01-31 | Stop reason: HOSPADM

## 2024-01-30 RX ORDER — FENTANYL CITRATE 50 UG/ML
INJECTION, SOLUTION INTRAMUSCULAR; INTRAVENOUS AS NEEDED
Status: DISCONTINUED | OUTPATIENT
Start: 2024-01-30 | End: 2024-01-30 | Stop reason: HOSPADM

## 2024-01-30 RX ORDER — POTASSIUM CHLORIDE 20 MEQ/1
40 TABLET, EXTENDED RELEASE ORAL ONCE
Status: DISCONTINUED | OUTPATIENT
Start: 2024-01-30 | End: 2024-01-31 | Stop reason: HOSPADM

## 2024-01-30 RX ORDER — ACETAMINOPHEN 650 MG/1
650 SUPPOSITORY RECTAL EVERY 6 HOURS PRN
Status: DISCONTINUED | OUTPATIENT
Start: 2024-01-30 | End: 2024-01-31 | Stop reason: HOSPADM

## 2024-01-30 RX ORDER — MAGNESIUM SULFATE HEPTAHYDRATE 40 MG/ML
2 INJECTION, SOLUTION INTRAVENOUS ONCE
Status: COMPLETED | OUTPATIENT
Start: 2024-01-30 | End: 2024-01-30

## 2024-01-30 RX ORDER — POTASSIUM CHLORIDE 29.8 MG/ML
INJECTION INTRAVENOUS
Status: DISPENSED
Start: 2024-01-30 | End: 2024-01-31

## 2024-01-30 RX ORDER — HYDROMORPHONE HYDROCHLORIDE 1 MG/ML
INJECTION, SOLUTION INTRAMUSCULAR; INTRAVENOUS; SUBCUTANEOUS
Status: COMPLETED
Start: 2024-01-30 | End: 2024-01-30

## 2024-01-30 RX ORDER — PROCHLORPERAZINE EDISYLATE 5 MG/ML
10 INJECTION INTRAMUSCULAR; INTRAVENOUS EVERY 6 HOURS PRN
Status: DISCONTINUED | OUTPATIENT
Start: 2024-01-30 | End: 2024-01-30

## 2024-01-30 RX ORDER — ATORVASTATIN CALCIUM 20 MG/1
10 TABLET, FILM COATED ORAL NIGHTLY
Status: DISCONTINUED | OUTPATIENT
Start: 2024-01-30 | End: 2024-01-31 | Stop reason: HOSPADM

## 2024-01-30 RX ORDER — PANTOPRAZOLE SODIUM 40 MG/10ML
40 INJECTION, POWDER, LYOPHILIZED, FOR SOLUTION INTRAVENOUS
Status: DISCONTINUED | OUTPATIENT
Start: 2024-01-31 | End: 2024-01-31 | Stop reason: HOSPADM

## 2024-01-30 RX ORDER — PANTOPRAZOLE SODIUM 40 MG/1
40 TABLET, DELAYED RELEASE ORAL
Status: DISCONTINUED | OUTPATIENT
Start: 2024-01-31 | End: 2024-01-31 | Stop reason: HOSPADM

## 2024-01-30 RX ORDER — PROTAMINE SULFATE 10 MG/ML
INJECTION, SOLUTION INTRAVENOUS CONTINUOUS PRN
Status: COMPLETED | OUTPATIENT
Start: 2024-01-30 | End: 2024-01-30

## 2024-01-30 RX ORDER — SODIUM CHLORIDE, SODIUM LACTATE, POTASSIUM CHLORIDE, CALCIUM CHLORIDE 600; 310; 30; 20 MG/100ML; MG/100ML; MG/100ML; MG/100ML
75 INJECTION, SOLUTION INTRAVENOUS CONTINUOUS
Status: ACTIVE | OUTPATIENT
Start: 2024-01-30 | End: 2024-01-30

## 2024-01-30 RX ORDER — ACETAMINOPHEN 325 MG/1
650 TABLET ORAL EVERY 6 HOURS PRN
Status: DISCONTINUED | OUTPATIENT
Start: 2024-01-30 | End: 2024-01-30

## 2024-01-30 RX ORDER — HYDROMORPHONE HYDROCHLORIDE 1 MG/ML
0.2 INJECTION, SOLUTION INTRAMUSCULAR; INTRAVENOUS; SUBCUTANEOUS ONCE
Status: COMPLETED | OUTPATIENT
Start: 2024-01-30 | End: 2024-01-30

## 2024-01-30 RX ORDER — CEFAZOLIN SODIUM 2 G/100ML
2 INJECTION, SOLUTION INTRAVENOUS ONCE
Status: COMPLETED | OUTPATIENT
Start: 2024-01-30 | End: 2024-01-30

## 2024-01-30 RX ORDER — PROCHLORPERAZINE MALEATE 10 MG
10 TABLET ORAL EVERY 6 HOURS PRN
Status: DISCONTINUED | OUTPATIENT
Start: 2024-01-30 | End: 2024-01-30

## 2024-01-30 RX ORDER — DOCUSATE SODIUM 100 MG/1
100 CAPSULE, LIQUID FILLED ORAL 2 TIMES DAILY
Status: DISCONTINUED | OUTPATIENT
Start: 2024-01-30 | End: 2024-01-31 | Stop reason: HOSPADM

## 2024-01-30 RX ORDER — ACETAMINOPHEN 160 MG/5ML
650 SOLUTION ORAL EVERY 6 HOURS PRN
Status: DISCONTINUED | OUTPATIENT
Start: 2024-01-30 | End: 2024-01-30

## 2024-01-30 RX ADMIN — HYDROMORPHONE HYDROCHLORIDE 0.2 MG: 1 INJECTION, SOLUTION INTRAMUSCULAR; INTRAVENOUS; SUBCUTANEOUS at 17:33

## 2024-01-30 RX ADMIN — ATORVASTATIN CALCIUM 10 MG: 20 TABLET, FILM COATED ORAL at 20:11

## 2024-01-30 RX ADMIN — MAGNESIUM SULFATE HEPTAHYDRATE 2 G: 40 INJECTION, SOLUTION INTRAVENOUS at 17:12

## 2024-01-30 RX ADMIN — ACETAMINOPHEN 650 MG: 325 TABLET ORAL at 14:10

## 2024-01-30 RX ADMIN — SODIUM CHLORIDE, POTASSIUM CHLORIDE, SODIUM LACTATE AND CALCIUM CHLORIDE 75 ML/HR: 600; 310; 30; 20 INJECTION, SOLUTION INTRAVENOUS at 10:34

## 2024-01-30 RX ADMIN — POTASSIUM CHLORIDE 40 MEQ: 29.8 INJECTION, SOLUTION INTRAVENOUS at 17:13

## 2024-01-30 RX ADMIN — SERTRALINE HYDROCHLORIDE 75 MG: 50 TABLET ORAL at 17:32

## 2024-01-30 RX ADMIN — DOCUSATE SODIUM 100 MG: 100 CAPSULE, LIQUID FILLED ORAL at 20:11

## 2024-01-30 RX ADMIN — ACETAMINOPHEN 650 MG: 325 TABLET ORAL at 20:11

## 2024-01-30 RX ADMIN — ASPIRIN 81 MG: 81 TABLET, COATED ORAL at 16:09

## 2024-01-30 RX ADMIN — BUSPIRONE HYDROCHLORIDE 10 MG: 10 TABLET ORAL at 20:11

## 2024-01-30 SDOH — SOCIAL STABILITY: SOCIAL INSECURITY: ABUSE: ADULT

## 2024-01-30 SDOH — SOCIAL STABILITY: SOCIAL INSECURITY: ARE YOU OR HAVE YOU BEEN THREATENED OR ABUSED PHYSICALLY, EMOTIONALLY, OR SEXUALLY BY ANYONE?: NO

## 2024-01-30 SDOH — SOCIAL STABILITY: SOCIAL INSECURITY: HAS ANYONE EVER THREATENED TO HURT YOUR FAMILY OR YOUR PETS?: NO

## 2024-01-30 SDOH — SOCIAL STABILITY: SOCIAL INSECURITY: DO YOU FEEL ANYONE HAS EXPLOITED OR TAKEN ADVANTAGE OF YOU FINANCIALLY OR OF YOUR PERSONAL PROPERTY?: NO

## 2024-01-30 SDOH — SOCIAL STABILITY: SOCIAL INSECURITY: HAVE YOU HAD THOUGHTS OF HARMING ANYONE ELSE?: NO

## 2024-01-30 SDOH — SOCIAL STABILITY: SOCIAL INSECURITY: ARE THERE ANY APPARENT SIGNS OF INJURIES/BEHAVIORS THAT COULD BE RELATED TO ABUSE/NEGLECT?: NO

## 2024-01-30 SDOH — SOCIAL STABILITY: SOCIAL INSECURITY: DOES ANYONE TRY TO KEEP YOU FROM HAVING/CONTACTING OTHER FRIENDS OR DOING THINGS OUTSIDE YOUR HOME?: NO

## 2024-01-30 SDOH — SOCIAL STABILITY: SOCIAL INSECURITY: WERE YOU ABLE TO COMPLETE ALL THE BEHAVIORAL HEALTH SCREENINGS?: YES

## 2024-01-30 SDOH — SOCIAL STABILITY: SOCIAL INSECURITY: DO YOU FEEL UNSAFE GOING BACK TO THE PLACE WHERE YOU ARE LIVING?: NO

## 2024-01-30 ASSESSMENT — PAIN - FUNCTIONAL ASSESSMENT
PAIN_FUNCTIONAL_ASSESSMENT: 0-10
PAIN_FUNCTIONAL_ASSESSMENT: 0-10

## 2024-01-30 ASSESSMENT — ACTIVITIES OF DAILY LIVING (ADL)
JUDGMENT_ADEQUATE_SAFELY_COMPLETE_DAILY_ACTIVITIES: YES
TOILETING: INDEPENDENT
BATHING: INDEPENDENT
LACK_OF_TRANSPORTATION: NO
PATIENT'S MEMORY ADEQUATE TO SAFELY COMPLETE DAILY ACTIVITIES?: YES
DRESSING YOURSELF: INDEPENDENT
LACK_OF_TRANSPORTATION: NO
HEARING - RIGHT EAR: FUNCTIONAL
FEEDING YOURSELF: INDEPENDENT
GROOMING: INDEPENDENT
ADEQUATE_TO_COMPLETE_ADL: YES
HEARING - LEFT EAR: FUNCTIONAL
WALKS IN HOME: INDEPENDENT

## 2024-01-30 ASSESSMENT — COLUMBIA-SUICIDE SEVERITY RATING SCALE - C-SSRS
1. IN THE PAST MONTH, HAVE YOU WISHED YOU WERE DEAD OR WISHED YOU COULD GO TO SLEEP AND NOT WAKE UP?: NO
2. HAVE YOU ACTUALLY HAD ANY THOUGHTS OF KILLING YOURSELF?: NO
6. HAVE YOU EVER DONE ANYTHING, STARTED TO DO ANYTHING, OR PREPARED TO DO ANYTHING TO END YOUR LIFE?: NO

## 2024-01-30 ASSESSMENT — PAIN SCALES - GENERAL
PAINLEVEL_OUTOF10: 0 - NO PAIN
PAINLEVEL_OUTOF10: 5 - MODERATE PAIN

## 2024-01-30 ASSESSMENT — LIFESTYLE VARIABLES
AUDIT-C TOTAL SCORE: 0
HOW OFTEN DO YOU HAVE 6 OR MORE DRINKS ON ONE OCCASION: NEVER
SKIP TO QUESTIONS 9-10: 1
HOW MANY STANDARD DRINKS CONTAINING ALCOHOL DO YOU HAVE ON A TYPICAL DAY: PATIENT DOES NOT DRINK
AUDIT-C TOTAL SCORE: 0
HOW OFTEN DO YOU HAVE A DRINK CONTAINING ALCOHOL: NEVER
SUBSTANCE_ABUSE_PAST_12_MONTHS: NO
PRESCIPTION_ABUSE_PAST_12_MONTHS: NO

## 2024-01-30 ASSESSMENT — PAIN DESCRIPTION - ORIENTATION: ORIENTATION: RIGHT

## 2024-01-30 ASSESSMENT — PAIN DESCRIPTION - LOCATION: LOCATION: NECK

## 2024-01-30 ASSESSMENT — PATIENT HEALTH QUESTIONNAIRE - PHQ9
2. FEELING DOWN, DEPRESSED OR HOPELESS: NOT AT ALL
SUM OF ALL RESPONSES TO PHQ9 QUESTIONS 1 & 2: 0
1. LITTLE INTEREST OR PLEASURE IN DOING THINGS: NOT AT ALL

## 2024-01-30 NOTE — POST-PROCEDURE NOTE
Physician Transition of Care Summary  Invasive Cardiovascular Lab    Procedure Date: 1/30/2024  Attending: Panel 1:     * Juan Carrasco - Primary  Panel 2:     * Joce Kenyon - Primary  Resident/Fellow/Other Assistant: Surgeon(s) and Role:  Panel 1:     * Dilan Weber MD - Fellow     * Debra Lee MD - Fellow    Indications:   Pre-op Diagnosis     * Nonrheumatic aortic valve stenosis [I35.0]    Post-procedure diagnosis:   Post-op Diagnosis     * Nonrheumatic aortic valve stenosis [I35.0]    Procedure(s):     * TAVR (Transcatheter AV Replacement)    * TVP for TAVR    * TAVR-OR    * Left Heart Cath, No LV  KY REPLACE AORTIC VALVE PERQ FEMORAL ARTRY APPROACH [[id number]]      Description of the Procedure:     Description of the Procedure:   S/p TAVR with Evolut fx 29, Predil 18 mm TruDil Balloon   Indication: Severe AS    Access  Primary: right CFA s/p 2 proglide  Secondary: left CFA 4 [place] s/p manual compression  TVP: right IJ vein, 7 [place], sutured in the cath lab due to CHB.     Intra-op echo - Trace, No effusion, mean gradient 3 mm Hg    Conclusion  TVP care  EP for possible PPM tomorrow  Monitor tele  Monitor access sites for bleeding  TTE tomorrow  Structural team will continue to follow.         Complications:   none    Estimated Blood Loss:   5 mL    Anesthesia: Moderate Sedation Anesthesia Staff: No anesthesia staff entered.    Any Specimen(s) Removed: none    Electronically signed by: Dilan Weber MD, 1/30/2024 12:47 PM

## 2024-01-30 NOTE — PROGRESS NOTES
Pharmacy Medication History Review    Ayo Bueno is a 83 y.o. female admitted for Aortic stenosis. Pharmacy reviewed the patient's xwtoc-ct-lokktesal medications and allergies for accuracy.    The list below reflects the updated PTA list. Comments regarding how patient may be taking medications differently can be found in the Admit Orders Activity  Prior to Admission Medications   Prescriptions Last Dose Informant   acetaminophen (Tylenol) 325 mg tablet Past Week Self   Sig: Take by mouth every 6 hours. 2 tab(s) orally every 6 hours-continue routinely around the clock for next 72 hours then decrease to as needed for mild pain or temperature   aspirin 81 mg EC tablet 1/30/2024 Self   Sig: Take 1 tablet (81 mg) by mouth once daily.   busPIRone (Buspar) 10 mg tablet 1/30/2024 Self   Sig: Take 1 tablet (10 mg) by mouth 2 times a day.   cholecalciferol (Vitamin D-3) 25 MCG (1000 UT) tablet 1/30/2024 Self   Sig: Take 1 tablet (25 mcg) by mouth once daily.   fluticasone (Flonase) 50 mcg/actuation nasal spray 1/30/2024 Self   Sig: Administer 1 spray into each nostril once daily.   hydroCHLOROthiazide (HYDRODiuril) 25 mg tablet 1/29/2024 Self   Sig: Take 1 tablet (25 mg) by mouth once daily.   ipratropium (Atrovent) 42 mcg (0.06 %) nasal spray Past Week Self   Sig: Administer 2 sprays into each nostril 3 times a day as needed for rhinitis.   levothyroxine (Synthroid, Levoxyl) 50 mcg tablet 1/30/2024 Self   Sig: TAKE ONE TABLET DAILY MONDAY THROUGH FRIDAY AND 2 TABLETS ON SATURDAY AND SUNDAY   lovastatin (Mevacor) 40 mg tablet 1/30/2024 Self   Sig: TAKE 1 TABLET EVERY DAY AT DINNER   omeprazole (PriLOSEC) 20 mg DR capsule 1/30/2024 Self   Sig: Take 1 capsule (20 mg) by mouth once daily.   polyethylene glycol (Glycolax) 17 gram/dose powder 1/30/2024 Self   Sig: Take 17 g by mouth twice a day. 17 gram(s) orally 2 times a day as needed for constipation   sertraline (Zoloft) 50 mg tablet 1/30/2024 Self   Sig: Take 1.5  tablets (75 mg) by mouth once daily.      Facility-Administered Medications: None        The list below reflects the updated allergy list. Please review each documented allergy for additional clarification and justification.  Allergies  Reviewed by Heather Aguilar PharmD on 1/30/2024        Severity Reactions Comments    Aspirin Not Specified Unknown     Prednisone Not Specified Unknown     Sulfamethoxazole-trimethoprim Not Specified Unknown             Patient was unable to be assessed for M2B at discharge. Pharmacy has been updated to [facility]. M2B service not offered prior to procedure, please reassess prior to patient discharge if Meds to Beds is desired.     Sources used to complete the med history include: Family interview - had list of medications (patient speaks [employer])/ Pharmacy - [facility], [facility]/ Chart review    Heather Aguilar PharmD  Transitions of Care Pharmacist  [facility]  Please reach out via Secure Chat for questions, or if no response call "[employer] or [facility]

## 2024-01-30 NOTE — H&P
History Of Present Illness  Ayo Bueno is a 83 y.o. female admitted to CICU post-TAVR for monitoring.       History taken in the presence of son who helped translate. Patient has had chronic gradually worsening exercise tolerance over the past several months. She denies shortness of breath or chest pain. Her outpatient TTE showed severe aortic stenosis. She underwent pre-TAVR evaluation, and was admitted today electively for TAVR.    On arrival she reports no chest pain, SOB, groin pain. She has 3/10 pain in her RIJ TVP site.    Past cardiac history:  TTE 10/2023:  Left Ventricle: The left ventricular systolic function is normal, with an estimated ejection fraction of 60-65%. There are no regional wall motion abnormalities. The left ventricular cavity size is normal. There is mild concentric left ventricular hypertrophy. Spectral Doppler shows an impaired relaxation pattern of left ventricular diastolic filling.  Left Atrium: The left atrium is mildly dilated.  Right Ventricle: The right ventricle is normal in size. There is normal right ventricular global systolic function.  Right Atrium: The right atrium is normal in size.  Aortic Valve: The aortic valve was not well visualized. There is mild to moderate aortic valve regurgitation. The peak instantaneous gradient of the aortic valve is 94.5 mmHg. The mean gradient of the aortic valve is 55.0 mmHg. Severe AS  A V-max 4.9 m/s  Peak/mean gradient 95/55 mmHg  NEHEMIAH 0.8 cmï¿½.  Mitral Valve: The mitral valve is moderately thickened. There is evidence of mild mitral valve stenosis. The doppler estimated mean and peak diastolic pressure gradients are 2.6 mmHg and 7.9 mmHg respectively. There is moderate mitral annular calcification. There is mild to moderate mitral valve regurgitation.  Tricuspid Valve: The tricuspid valve is structurally normal. There is trace tricuspid regurgitation. The Doppler estimated RVSP is mildly elevated.  Pulmonic Valve: The pulmonic  valve is structurally normal. There is no indication of pulmonic valve regurgitation.  Pericardium: There is no pericardial effusion noted.  Aorta: The aortic root is normal.        CONCLUSIONS:   1. Left ventricular systolic function is normal with a 60-65% estimated ejection fraction.   2. Spectral Doppler shows an impaired relaxation pattern of left ventricular diastolic filling.   3. The mitral valve is moderately thickened.   4. There is mild mitral valve stenosis.   5. There is moderate mitral annular calcification.   6. Mild to moderate mitral valve regurgitation.   7. Mildly elevated RVSP.   8. Severe AS      A V-max 4.9 m/s      Peak/mean gradient 95/55 mmHg      NEHEMIAH 0.8 cmï¿½.   9. Mild to moderate aortic valve regurgitation.    LHC/RHC 1/2024:  Distal left main 30% stenosis  Luminal irregularities and tortuosity throughout LAD  Luminal irregularities and tortuosity throughout left circumflex system  Luminal irregularities and tortuosity throughout RCA  Right dominant coronary artery system  Severe calcification of the mitral valve is appreciated in this study.  Normal cardiac output  Elevated filling pressure.    RA 12, RV 72/9, PA 66/26 (43), PCWP 23, CO/CI 5.4/3.1    CT TAVR 12/2023:  1. Extensive atherosclerotic changes involving the thoracoabdominal  aorta and its branches. Access vessels are widely patent.  2. Severe calcifications of the aortic valve correlating with history  of aortic stenosis.  3. Severe mitral annular calcifications, possibly caseous.  4. Moderate coronary artery calcifications.  5. Left atrium appears subjectively dilated.  6. Colonic diverticulosis.  7. Sub-6 mm right lower lobe pulmonary nodule. See below for  follow-up recommendations.   Past Medical History  Past Medical History:   Diagnosis Date    Aortic stenosis 11/15/2023    moderate    Benign neoplasm, unspecified site     Adenoma    Corns and callosities     Pre-ulcerative corn or callous    Hyperlipidemia 02/23/2023     Hypertension 2023    Impingement syndrome of unspecified shoulder     Shoulder impingement syndrome    Mitral stenosis 11/15/2023    Personal history of other diseases of the nervous system and sense organs     History of carpal tunnel syndrome    Personal history of other diseases of urinary system     History of hematuria    Personal history of other endocrine, nutritional and metabolic disease     History of hypokalemia    Personal history of other endocrine, nutritional and metabolic disease     History of obesity    Personal history of other endocrine, nutritional and metabolic disease     History of hyperlipidemia    Personal history of other endocrine, nutritional and metabolic disease     History of hypothyroidism    Personal history of other specified conditions     History of nausea    Shortness of breath 11/15/2023    Unspecified tear of unspecified meniscus, current injury, left knee, initial encounter     Tear of cartilage of left knee       Surgical History  Past Surgical History:   Procedure Laterality Date    APPENDECTOMY  2018    Appendectomy    CARDIAC CATHETERIZATION N/A 2024    Procedure: Left And Right Heart Cath, Without LV;  Surgeon: Anam Garcai MD PhD;  Location: [facility] [facility];  Service: Cardiovascular;  Laterality: N/A;    KNEE SURGERY  2018    Knee Surgery    OTHER SURGICAL HISTORY  2018    Surgically Induced         Social History  She reports that she has never smoked. She has never been exposed to tobacco smoke. She has never used smokeless tobacco. She reports that she does not drink alcohol and does not use drugs.    Family History  Family History   Problem Relation Name Age of Onset    Tuberculosis Mother      Stroke Sister      Coronary artery disease Sister      Diabetes Brother          Allergies  Aspirin, Prednisone, and Sulfamethoxazole-trimethoprim     Physical Exam  Constitutional:       Appearance: Normal appearance.    Neck:      Comments: [facility] CVC (TVP) in place  Cardiovascular:      Rate and Rhythm: Normal rate and regular rhythm.      Pulses: Normal pulses.      Heart sounds: Normal heart sounds.      Comments: Intermittently paced  Pulmonary:      Effort: Pulmonary effort is normal.      Breath sounds: Normal breath sounds.   Musculoskeletal:      Comments: Bilateral groin soft with no signs of swelling    Neurological:      General: No focal deficit present.      Mental Status: She is alert and oriented to person, place, and time. Mental status is at baseline.          Last Recorded Vitals  Blood pressure 138/65, pulse 63, resp. rate 18, SpO2 97 %.    Relevant Results  Results for orders placed or performed during the hospital encounter of 01/30/24 (from the past 24 hour(s))   ECG 12 lead   Result Value Ref Range    Ventricular Rate 75 BPM    Atrial Rate 75 BPM    IN Interval 168 ms    QRS Duration 88 ms    QT Interval 442 ms    QTC Calculation(Bazett) 493 ms    R Axis -5 degrees    T Axis 27 degrees    QRS Count 13 beats    Q Onset 214 ms    P Onset 130 ms    P Offset 178 ms    T Offset 435 ms    QTC Fredericia 475 ms   Basic metabolic panel   Result Value Ref Range    Glucose 127 (H) 74 - 99 mg/dL    Sodium 140 136 - 145 mmol/L    Potassium 3.5 3.5 - 5.3 mmol/L    Chloride 103 98 - 107 mmol/L    Bicarbonate 30 21 - 32 mmol/L    Anion Gap 11 10 - 20 mmol/L    Urea Nitrogen 21 6 - 23 mg/dL    Creatinine 0.70 0.50 - 1.05 mg/dL    eGFR 87 >60 mL/min/1.73m*2    Calcium 8.8 8.6 - 10.6 mg/dL   Magnesium   Result Value Ref Range    Magnesium 1.78 1.60 - 2.40 mg/dL   CBC and Auto Differential   Result Value Ref Range    WBC 3.6 (L) 4.4 - 11.3 x10*3/uL    nRBC 0.0 0.0 - 0.0 /100 WBCs    RBC 3.55 (L) 4.00 - 5.20 x10*6/uL    Hemoglobin 11.0 (L) 12.0 - 16.0 g/dL    Hematocrit 32.6 (L) 36.0 - 46.0 %    MCV 92 80 - 100 fL    MCH 31.0 26.0 - 34.0 pg    MCHC 33.7 32.0 - 36.0 g/dL    RDW 13.2 11.5 - 14.5 %    Platelets 125 (L) 150 - 450  x10*3/uL    Neutrophils % 67.6 40.0 - 80.0 %    Immature Granulocytes %, Automated 0.6 0.0 - 0.9 %    Lymphocytes % 20.4 13.0 - 44.0 %    Monocytes % 9.4 2.0 - 10.0 %    Eosinophils % 1.4 0.0 - 6.0 %    Basophils % 0.6 0.0 - 2.0 %    Neutrophils Absolute 2.46 1.60 - 5.50 x10*3/uL    Immature Granulocytes Absolute, Automated 0.02 0.00 - 0.50 x10*3/uL    Lymphocytes Absolute 0.74 (L) 0.80 - 3.00 x10*3/uL    Monocytes Absolute 0.34 0.05 - 0.80 x10*3/uL    Eosinophils Absolute 0.05 0.00 - 0.40 x10*3/uL    Basophils Absolute 0.02 0.00 - 0.10 x10*3/uL   Protime-INR   Result Value Ref Range    Protime 14.8 (H) 9.8 - 12.8 seconds    INR 1.3 (H) 0.9 - 1.1        Assessment/Plan   Principal Problem:    Aortic stenosis  Active Problems:    S/P TAVR (transcatheter aortic valve replacement)    Ayo Bueno is a 83 y.o. female admitted to CICU post-TAVR for monitoring. She tolerated the procedure well, and is in CICU for routine post-TAVR monitoring.      NEURO  #Depression  -c/w sertaline and buspirone      CARDIO  #Aortic Stenosis  #TAVR  ::TTE 10/2023: A V-max 4.9 m/s Peak/mean gradient 95/55 mmHg NEHEMIAH 0.8 cmï  ::s/p TAVR 1/30/2024, post gradient TTE 3mm Hg  -Bed rest 4 hours  -c/w aspirin   -CTM on TVP, decide whether or not PPM is needed tomorrow  -repeat TTE     #HTN  -Hold home hydrochlorothiazide 25mg,     #HLD  ::On lovastatin 40 at home  -c/w atorvastatin 10mg     PULM  No active issues     GI  -PPI  -Stool regimen     ENDO  #Hypothyroidism  -c/w levothyroxine 50mcg     F: PRN  E: PRN   N: cardiac  A: RIJ CVC (TVP)  DVT: lovenox  GI:home PPI     Code status: Full Code (confirmed on admission)  NOK: Willie Lizarraga [phone]      Mega Pate MD      Attending Note:  I saw and evaluated the patient. I personally obtained the key and critical portions of the history and physical exam or was physically present for key and critical portions performed by the resident/fellow. I reviewed the resident/fellow's  documentation and discussed the patient with the resident/fellow. I agree with the resident/fellow's medical decision making as documented in the note with the exception/addition of the following:    S/p TAVR - no pacing requirement overnight.  Disposition home.    MD Galen Mccoy MD

## 2024-01-30 NOTE — H&P
HPI: Patient is a 89-year-old female with past medical history of hypertension hyperlipidemia mild mitral stenosis was referred to us for severe aortic valve stenosis.  On my interview today patient endorses dyspnea with exertion for example while doing her yard work which is a change compared to past.   patient also endorses shooting pain in the chest which is intermittent which she thinks may be chest discomfort.  Patient states that his symptoms are progressive.           ROS:     Constitutional: fatigue  Eyes: no eyesight problems, no blurred vision, no diplopia, no eye pain, no purulent discharge from the eyes, eyes not red, no dryness of the eyes and no itching of the eyes.   ENT: no nosebleeds, no hearing loss, no tinnitus, no earache, no sore throat, no hoarseness, no swollen glands in the neck and no nasal discharge.   Cardiovascular: dyspnea on exertion, no chest pain  Respiratory: no chronic cough, not coughing up sputum, no wheezing that is consistent with asthma  Gastrointestinal: no change in bowel habits, no blood in stools, no diarrhea, no constipation, no nausea, no vomiting, no abdominal pain, no signs and symptoms of ulcer disease, no fan colored stools and no intolerance to fatty foods.   Genitourinary: no hematuria,  no urinary frequency, no dysuria, no incontinence, no burning sensation during urination, no urinary hesitancy, no nocturia, no genital lesion, no testicular pain, urinary stream is not smaller and urinary stream does not start and stop.   Musculoskeletal: no arthralgias, no myalgias, no joint swelling, no joint stiffness, no muscle weakness, no back pain, no limb pain, no limb swelling and no difficulty walking.   Skin: no skin rashes, no change in skin color and pigmentation, no skin lesions and no skin lumps.   Neurological: no seizures, no frequent falls, no headaches, no dizziness, no tingling, no fainting and no limb weakness.   Psychiatric: no depression, not suicidal, no  "confusion, no memory lapses or loss, no anxiety, no personality change and no emotional problems.   Endocrine: no goiter, no thyroid disorder, no diabetes mellitus, no excessive thirst, no dry skin, no cold intolerance, no heat intolerance, no erectile dysfunction, no increased urinary frequency, no proptosis and no deepening of the voice.   Hematologic/Lymphatic: no bleeding issues.   All other systems have been reviewed and are negative for complaint.         TAVR Workup:   - NYHA: II  - Frailty: 1/5         - TTE: 10/16/2023 EF 60 to 65%, mean gradient of aortic valve 55 mmHg aortic valve V-max 4.9 m/s aortic valve area 0.8 cm², mild mitral valve stenosis mean gradient 2.6 mmHg, mild to moderate aortic valve regurgitation       - STS  Procedure Type: Isolated AVR  Perioperative Outcome           Estimate %  Operative Mortality     3.43%  Morbidity & Mortality 9.19%  Stroke  1.41%  Renal Failure    1.37%  Reoperation     3.76%  Prolonged Ventilation 4.18%  Deep Sternal Wound Infection            0.047%  Long Hospital Stay (>14 days)            3.77%  Short Hospital Stay (<6 days)*            41.6%     Physical Exam:  Phone visit             7/1/2021    11:10 AM 12/17/2021    12:20 PM 5/26/2022     1:38 PM 7/21/2022     1:32 PM 4/5/2023     1:21 PM 8/30/2023    10:32 AM 11/21/2023    10:29 AM   Vitals   Systolic 128 124 120 130 120 128 118   Diastolic 76 76 60 78 70 80 68   Heart Rate   64 75 92 69 78 68   Temp 36.2 °C (97.2 °F) 36.3 °C (97.3 °F) 36.4 °C (97.5 °F)   36.4 °C (97.5 °F) 36.6 °C (97.9 °F)     Resp     16 18 18       Height (in) 1.549 m (5' 1\")   1.549 m (5' 1\")     1.524 m (5')     Weight (lb) 170.25 169.25 165 163 163 155 154   BMI 32.17 kg/m2 31.98 kg/m2 31.18 kg/m2 30.8 kg/m2 30.8 kg/m2 30.27 kg/m2 30.08 kg/m2   BSA (m2) 1.82 m2 1.82 m2 1.79 m2 1.78 m2 1.78 m2 1.73 m2 1.72 m2   Visit Report         Report Report Report              Current Outpatient Medications   Medication Instructions    " acetaminophen (Tylenol) 325 mg tablet oral, Every 6 hours, 2 tab(s) orally every 6 hours-continue routinely around the clock for next 72 hours then decrease to as needed for mild pain or temperature    aspirin 81 mg, oral, Daily    busPIRone (BUSPAR) 10 mg, oral, 2 times daily    cholecalciferol (VITAMIN D-3) 25 mcg, oral, Daily    fluticasone (Flonase) 50 mcg/actuation nasal spray 1 spray, Each Nostril, Daily    hydroCHLOROthiazide (HYDRODIURIL) 25 mg, oral, Daily    ipratropium (Atrovent) 42 mcg (0.06 %) nasal spray 2 sprays, Each Nostril, 3 times daily PRN    levothyroxine (Synthroid, Levoxyl) 50 mcg tablet TAKE ONE TABLET DAILY MONDAY THROUGH FRIDAY AND 2 TABLETS ON SATURDAY AND SUNDAY    lovastatin (Mevacor) 40 mg tablet TAKE 1 TABLET EVERY DAY AT DINNER    omeprazole (PRILOSEC) 20 mg, oral, Daily    polyethylene glycol (GLYCOLAX, MIRALAX) 17 g, oral, 2 times daily, 17 gram(s) orally 2 times a day as needed for constipation    sertraline (ZOLOFT) 75 mg, oral, Daily         Impression:   Patient is a 82-year-old female with symptomatic critical aortic stenosis, treatment is indicated.    We discussed all the risks associated with the procedure, including but not limited to stroke, MI, pericardial tamponade, vascular complications, infection and death were discussed with the patient. The risk of needing a permament pacemaker was also discussed in detail. The patient verbalized understanding and decided to proceed with the procedure.      Plan for JUDE today

## 2024-01-30 NOTE — OP NOTE
Date of the Operation:24   Pt Name:Ayo Bueno   MRN:[id number]   Pre Op D. - Severe aortic valve stenosis  Post Op D. - Severe aortic valve stenosis  Operation/Procedure:  1. - Pre-dilatation aortic Balloon plasty with a 20x45 mm True Balloon.  2. - TAVR: Evolute FX29mm  Surgeon: JULISSA Kenyon  Cardiologist: SISSY Carrasco  Anesthesia Type: Conscious sedation and local 2% lidocaine  Complications: None  Estimated Blood loss: 150 cc  Operative indications: The patient was well diagnosed with severe aortic valve stenosis. The heart team recommended to perform a TAVR.   Operative Findings: Per fluoroscopy severe calcification of the aortic valve.  Operative procedure: The patient was placed on the surgical table in a supine position. Prepped and draped as usual.  2% lidocaine for all the percutaneous access.  TPMW through the RIJ. Secondary access: L femoral artery with a 6Fr introducer sheath. Heparin is given for ACT>300s. Primary access on the R femoral artery was prepared with 2 perclose and a 14/18 Fr introducer sheath.  Pigtails are placed in the NCS and the LV Crockett. Pre implant hemodynamics are obtained. GW exchange to a Safari preformed wire. A pre-implant balloon-plasty with a  mm balloon. The valve previously chosen and prepared is now pushed under fluoroscopy across the aortic arch and then across the valve. Rapid pacing, and the valve is deployed. Delivery system is removed. Post implants hemodynamics are obtained. Post implant surface echo is done showing minimal to no PVL. The procedure is considered successful. The Protamine is given. Perclose are tied down and an Angioseal is applied to the other FA. Pulses are checked. The patient has no new electrical disturbances so the TPMW is pulled out and the patient is transferred for postoperative management.

## 2024-01-30 NOTE — Clinical Note
Temporary pacemaker left in place secured. Temporary pacemaker location through right internal jugular vein. Heart rate: 60. Current 10 (mA). Centimeters 35.

## 2024-01-30 NOTE — Clinical Note
8/5/22 - An auto remote Pacemaker transmission was received due to alert criteria being met for Atrial burden above limit. LV EDP obtained

## 2024-01-30 NOTE — PROGRESS NOTES
Social Work Transitional Care Note:  -Patient discussed during CICU 2pm interdisciplinary rounds.   -ICU Treatment Plan: The patient was admitted on 1/30 s/p TAVR  - Planned Disposition: Pending medical outcome and rehab recommendation   - Potential Barriers: none noted at this time. SW will continue to follow  -Anticipated Date of Discharge:  1/30  Waleska CASANOVA, LSW

## 2024-01-31 ENCOUNTER — APPOINTMENT (OUTPATIENT)
Dept: CARDIOLOGY | Facility: HOSPITAL | Age: 84
DRG: 267 | End: 2024-01-31
Payer: MEDICARE

## 2024-01-31 ENCOUNTER — APPOINTMENT (OUTPATIENT)
Dept: RADIOLOGY | Facility: HOSPITAL | Age: 84
DRG: 267 | End: 2024-01-31
Payer: MEDICARE

## 2024-01-31 VITALS
WEIGHT: 156.09 LBS | RESPIRATION RATE: 22 BRPM | HEIGHT: 61 IN | HEART RATE: 73 BPM | TEMPERATURE: 99.3 F | OXYGEN SATURATION: 96 % | SYSTOLIC BLOOD PRESSURE: 130 MMHG | DIASTOLIC BLOOD PRESSURE: 56 MMHG | BODY MASS INDEX: 29.47 KG/M2

## 2024-01-31 LAB
ALBUMIN SERPL BCP-MCNC: 3.5 G/DL (ref 3.4–5)
ALP SERPL-CCNC: 41 U/L (ref 33–136)
ALT SERPL W P-5'-P-CCNC: 7 U/L (ref 7–45)
ANION GAP SERPL CALC-SCNC: 11 MMOL/L (ref 10–20)
AORTIC VALVE MEAN GRADIENT: 9 MMHG
AORTIC VALVE PEAK VELOCITY: 2.21 M/S
AST SERPL W P-5'-P-CCNC: 18 U/L (ref 9–39)
ATRIAL RATE: 73 BPM
AV PEAK GRADIENT: 19.5 MMHG
AVA (PEAK VEL): 2 CM2
AVA (VTI): 2.1 CM2
BASOPHILS # BLD AUTO: 0.02 X10*3/UL (ref 0–0.1)
BASOPHILS NFR BLD AUTO: 0.5 %
BILIRUB SERPL-MCNC: 0.4 MG/DL (ref 0–1.2)
BUN SERPL-MCNC: 22 MG/DL (ref 6–23)
CALCIUM SERPL-MCNC: 8.6 MG/DL (ref 8.6–10.6)
CHLORIDE SERPL-SCNC: 103 MMOL/L (ref 98–107)
CO2 SERPL-SCNC: 30 MMOL/L (ref 21–32)
CREAT SERPL-MCNC: 0.71 MG/DL (ref 0.5–1.05)
EGFRCR SERPLBLD CKD-EPI 2021: 86 ML/MIN/1.73M*2
EJECTION FRACTION APICAL 4 CHAMBER: 70
EJECTION FRACTION: 65 %
EOSINOPHIL # BLD AUTO: 0.06 X10*3/UL (ref 0–0.4)
EOSINOPHIL NFR BLD AUTO: 1.6 %
ERYTHROCYTE [DISTWIDTH] IN BLOOD BY AUTOMATED COUNT: 13.2 % (ref 11.5–14.5)
GLUCOSE BLD MANUAL STRIP-MCNC: 217 MG/DL (ref 74–99)
GLUCOSE SERPL-MCNC: 97 MG/DL (ref 74–99)
HCT VFR BLD AUTO: 28.4 % (ref 36–46)
HGB BLD-MCNC: 9.3 G/DL (ref 12–16)
IMM GRANULOCYTES # BLD AUTO: 0.01 X10*3/UL (ref 0–0.5)
IMM GRANULOCYTES NFR BLD AUTO: 0.3 % (ref 0–0.9)
INR PPP: 1.1 (ref 0.9–1.1)
LEFT VENTRICLE INTERNAL DIMENSION DIASTOLE: 5.2 CM (ref 3.5–6)
LEFT VENTRICULAR OUTFLOW TRACT DIAMETER: 1.9 CM
LYMPHOCYTES # BLD AUTO: 1.01 X10*3/UL (ref 0.8–3)
LYMPHOCYTES NFR BLD AUTO: 26.5 %
MAGNESIUM SERPL-MCNC: 2.18 MG/DL (ref 1.6–2.4)
MCH RBC QN AUTO: 29.7 PG (ref 26–34)
MCHC RBC AUTO-ENTMCNC: 32.7 G/DL (ref 32–36)
MCV RBC AUTO: 91 FL (ref 80–100)
MITRAL VALVE E/A RATIO: 1.17
MITRAL VALVE E/E' RATIO: 28.46
MONOCYTES # BLD AUTO: 0.55 X10*3/UL (ref 0.05–0.8)
MONOCYTES NFR BLD AUTO: 14.4 %
NEUTROPHILS # BLD AUTO: 2.16 X10*3/UL (ref 1.6–5.5)
NEUTROPHILS NFR BLD AUTO: 56.7 %
NRBC BLD-RTO: 0 /100 WBCS (ref 0–0)
P AXIS: -34 DEGREES
P OFFSET: 198 MS
P ONSET: 140 MS
PLATELET # BLD AUTO: 106 X10*3/UL (ref 150–450)
POTASSIUM SERPL-SCNC: 3.8 MMOL/L (ref 3.5–5.3)
PR INTERVAL: 178 MS
PROT SERPL-MCNC: 5.8 G/DL (ref 6.4–8.2)
PROTHROMBIN TIME: 12.3 SECONDS (ref 9.8–12.8)
Q ONSET: 229 MS
QRS COUNT: 12 BEATS
QRS DURATION: 118 MS
QT INTERVAL: 448 MS
QTC CALCULATION(BAZETT): 493 MS
QTC FREDERICIA: 478 MS
R AXIS: 29 DEGREES
RBC # BLD AUTO: 3.13 X10*6/UL (ref 4–5.2)
RIGHT VENTRICLE FREE WALL PEAK S': 13.9 CM/S
RIGHT VENTRICLE PEAK SYSTOLIC PRESSURE: 33.5 MMHG
SODIUM SERPL-SCNC: 140 MMOL/L (ref 136–145)
T AXIS: -5 DEGREES
T OFFSET: 453 MS
TRICUSPID ANNULAR PLANE SYSTOLIC EXCURSION: 2.5 CM
VENTRICULAR RATE: 73 BPM
WBC # BLD AUTO: 3.8 X10*3/UL (ref 4.4–11.3)

## 2024-01-31 PROCEDURE — 71045 X-RAY EXAM CHEST 1 VIEW: CPT | Performed by: RADIOLOGY

## 2024-01-31 PROCEDURE — 93005 ELECTROCARDIOGRAM TRACING: CPT

## 2024-01-31 PROCEDURE — 2500000001 HC RX 250 WO HCPCS SELF ADMINISTERED DRUGS (ALT 637 FOR MEDICARE OP)

## 2024-01-31 PROCEDURE — 93321 DOPPLER ECHO F-UP/LMTD STD: CPT | Performed by: INTERNAL MEDICINE

## 2024-01-31 PROCEDURE — 83735 ASSAY OF MAGNESIUM: CPT | Performed by: NURSE PRACTITIONER

## 2024-01-31 PROCEDURE — 93325 DOPPLER ECHO COLOR FLOW MAPG: CPT

## 2024-01-31 PROCEDURE — 93270 REMOTE 30 DAY ECG REV/REPORT: CPT

## 2024-01-31 PROCEDURE — 2500000004 HC RX 250 GENERAL PHARMACY W/ HCPCS (ALT 636 FOR OP/ED): Performed by: NURSE PRACTITIONER

## 2024-01-31 PROCEDURE — 37799 UNLISTED PX VASCULAR SURGERY: CPT | Performed by: NURSE PRACTITIONER

## 2024-01-31 PROCEDURE — 99222 1ST HOSP IP/OBS MODERATE 55: CPT | Performed by: INTERNAL MEDICINE

## 2024-01-31 PROCEDURE — 99239 HOSP IP/OBS DSCHRG MGMT >30: CPT | Performed by: NURSE PRACTITIONER

## 2024-01-31 PROCEDURE — 71045 X-RAY EXAM CHEST 1 VIEW: CPT

## 2024-01-31 PROCEDURE — 93325 DOPPLER ECHO COLOR FLOW MAPG: CPT | Performed by: INTERNAL MEDICINE

## 2024-01-31 PROCEDURE — 97161 PT EVAL LOW COMPLEX 20 MIN: CPT | Mod: GP

## 2024-01-31 PROCEDURE — 97165 OT EVAL LOW COMPLEX 30 MIN: CPT | Mod: GO

## 2024-01-31 PROCEDURE — 93308 TTE F-UP OR LMTD: CPT | Performed by: INTERNAL MEDICINE

## 2024-01-31 PROCEDURE — 93272 ECG/REVIEW INTERPRET ONLY: CPT | Performed by: INTERNAL MEDICINE

## 2024-01-31 PROCEDURE — 97116 GAIT TRAINING THERAPY: CPT | Mod: GP

## 2024-01-31 PROCEDURE — 85610 PROTHROMBIN TIME: CPT | Performed by: NURSE PRACTITIONER

## 2024-01-31 PROCEDURE — 82435 ASSAY OF BLOOD CHLORIDE: CPT

## 2024-01-31 PROCEDURE — 2500000004 HC RX 250 GENERAL PHARMACY W/ HCPCS (ALT 636 FOR OP/ED)

## 2024-01-31 PROCEDURE — 85025 COMPLETE CBC W/AUTO DIFF WBC: CPT | Performed by: NURSE PRACTITIONER

## 2024-01-31 RX ORDER — POTASSIUM CHLORIDE 20 MEQ/1
20 TABLET, EXTENDED RELEASE ORAL ONCE
Status: COMPLETED | OUTPATIENT
Start: 2024-01-31 | End: 2024-01-31

## 2024-01-31 RX ADMIN — ENOXAPARIN SODIUM 40 MG: 100 INJECTION SUBCUTANEOUS at 08:07

## 2024-01-31 RX ADMIN — PERFLUTREN 1 ML OF DILUTION: 6.52 INJECTION, SUSPENSION INTRAVENOUS at 12:27

## 2024-01-31 RX ADMIN — BUSPIRONE HYDROCHLORIDE 10 MG: 10 TABLET ORAL at 08:07

## 2024-01-31 RX ADMIN — POTASSIUM CHLORIDE 20 MEQ: 1500 TABLET, EXTENDED RELEASE ORAL at 09:40

## 2024-01-31 RX ADMIN — SERTRALINE HYDROCHLORIDE 75 MG: 50 TABLET ORAL at 08:06

## 2024-01-31 RX ADMIN — ACETAMINOPHEN 650 MG: 325 TABLET ORAL at 06:40

## 2024-01-31 RX ADMIN — PANTOPRAZOLE SODIUM 40 MG: 40 TABLET, DELAYED RELEASE ORAL at 06:03

## 2024-01-31 RX ADMIN — ASPIRIN 81 MG: 81 TABLET, COATED ORAL at 08:06

## 2024-01-31 RX ADMIN — LEVOTHYROXINE SODIUM 50 MCG: 50 TABLET ORAL at 06:03

## 2024-01-31 SDOH — SOCIAL STABILITY: SOCIAL INSECURITY
WITHIN THE LAST YEAR, HAVE TO BEEN RAPED OR FORCED TO HAVE ANY KIND OF SEXUAL ACTIVITY BY YOUR PARTNER OR EX-PARTNER?: NO

## 2024-01-31 SDOH — SOCIAL STABILITY: SOCIAL INSECURITY: WITHIN THE LAST YEAR, HAVE YOU BEEN HUMILIATED OR EMOTIONALLY ABUSED IN OTHER WAYS BY YOUR PARTNER OR EX-PARTNER?: NO

## 2024-01-31 SDOH — ECONOMIC STABILITY: FOOD INSECURITY: WITHIN THE PAST 12 MONTHS, YOU WORRIED THAT YOUR FOOD WOULD RUN OUT BEFORE YOU GOT MONEY TO BUY MORE.: NEVER TRUE

## 2024-01-31 SDOH — HEALTH STABILITY: MENTAL HEALTH
STRESS IS WHEN SOMEONE FEELS TENSE, NERVOUS, ANXIOUS, OR CAN'T SLEEP AT NIGHT BECAUSE THEIR MIND IS TROUBLED. HOW STRESSED ARE YOU?: NOT AT ALL

## 2024-01-31 SDOH — ECONOMIC STABILITY: FOOD INSECURITY: WITHIN THE PAST 12 MONTHS, THE FOOD YOU BOUGHT JUST DIDN'T LAST AND YOU DIDN'T HAVE MONEY TO GET MORE.: NEVER TRUE

## 2024-01-31 SDOH — SOCIAL STABILITY: SOCIAL INSECURITY
WITHIN THE LAST YEAR, HAVE YOU BEEN KICKED, HIT, SLAPPED, OR OTHERWISE PHYSICALLY HURT BY YOUR PARTNER OR EX-PARTNER?: NO

## 2024-01-31 SDOH — SOCIAL STABILITY: SOCIAL INSECURITY: WITHIN THE LAST YEAR, HAVE YOU BEEN AFRAID OF YOUR PARTNER OR EX-PARTNER?: NO

## 2024-01-31 SDOH — ECONOMIC STABILITY: INCOME INSECURITY: IN THE PAST 12 MONTHS, HAS THE ELECTRIC, GAS, OIL, OR WATER COMPANY THREATENED TO SHUT OFF SERVICE IN YOUR HOME?: NO

## 2024-01-31 ASSESSMENT — COGNITIVE AND FUNCTIONAL STATUS - GENERAL
WALKING IN HOSPITAL ROOM: A LITTLE
DAILY ACTIVITIY SCORE: 20
TURNING FROM BACK TO SIDE WHILE IN FLAT BAD: A LITTLE
DRESSING REGULAR LOWER BODY CLOTHING: A LITTLE
MOBILITY SCORE: 19
DRESSING REGULAR UPPER BODY CLOTHING: A LITTLE
TOILETING: A LITTLE
MOVING TO AND FROM BED TO CHAIR: A LITTLE
HELP NEEDED FOR BATHING: A LITTLE
STANDING UP FROM CHAIR USING ARMS: A LITTLE
CLIMB 3 TO 5 STEPS WITH RAILING: A LITTLE

## 2024-01-31 ASSESSMENT — PAIN SCALES - GENERAL
PAINLEVEL_OUTOF10: 0 - NO PAIN

## 2024-01-31 ASSESSMENT — PAIN - FUNCTIONAL ASSESSMENT
PAIN_FUNCTIONAL_ASSESSMENT: 0-10

## 2024-01-31 ASSESSMENT — ACTIVITIES OF DAILY LIVING (ADL): BATHING_ASSISTANCE: STAND BY

## 2024-01-31 NOTE — DISCHARGE SUMMARY
Discharge Diagnosis  Aortic stenosis    Issues Requiring Follow-Up  None    Hospital Course   Ayo Bueno is a 83 y.o. year old female. S/p B/L femoral artery (Right  primary) Evolut FX 29mm Predil with 18mm TruDil Balloon  via right Femoral Artery on  1/30/2024      patient with   Past Medical History:   Diagnosis Date    Aortic stenosis 11/15/2023    moderate    Benign neoplasm, unspecified site     Adenoma    Corns and callosities     Pre-ulcerative corn or callous    Hyperlipidemia 02/23/2023    Hypertension 02/23/2023    Impingement syndrome of unspecified shoulder     Shoulder impingement syndrome    Mitral stenosis 11/15/2023    Personal history of other diseases of the nervous system and sense organs     History of carpal tunnel syndrome    Personal history of other diseases of urinary system     History of hematuria    Personal history of other endocrine, nutritional and metabolic disease     History of hypokalemia    Personal history of other endocrine, nutritional and metabolic disease     History of obesity    Personal history of other endocrine, nutritional and metabolic disease     History of hyperlipidemia    Personal history of other endocrine, nutritional and metabolic disease     History of hypothyroidism    Personal history of other specified conditions     History of nausea    Shortness of breath 11/15/2023    Unspecified tear of unspecified meniscus, current injury, left knee, initial encounter     Tear of cartilage of left knee       Patient Vitals for the past 24 hrs:   BP Temp Temp src Pulse Resp SpO2 Height Weight   01/31/24 1100 125/57 -- -- 74 20 94 % -- --   01/31/24 1000 (!) 125/96 -- -- 82 19 94 % -- --   01/31/24 0900 126/61 -- -- 77 18 96 % -- --   01/31/24 0800 125/52 36.5 °C (97.7 °F) Temporal 78 15 96 % -- --   01/31/24 0700 128/67 -- -- 73 16 95 % -- --   01/31/24 0644 -- -- -- 70 15 97 % -- 70.8 kg (156 lb 1.4 oz)   01/31/24 0600 120/60 -- -- 74 17 93 % -- --   01/31/24  "0500 (!) 115/44 -- -- 65 14 97 % -- --   01/31/24 0427 -- 35.7 °C (96.3 °F) Temporal 81 23 97 % -- --   01/31/24 0400 120/57 -- -- 65 17 98 % -- --   01/31/24 0300 108/52 -- -- 58 12 98 % -- --   01/31/24 0200 98/52 -- -- 61 25 96 % -- --   01/31/24 0100 104/51 -- -- 64 20 96 % -- --   01/31/24 0035 -- 36.4 °C (97.5 °F) Temporal 60 17 96 % -- --   01/31/24 0000 (!) 101/49 -- -- 62 12 97 % -- --   01/30/24 2300 121/61 -- -- 67 15 98 % -- --   01/30/24 2200 (!) 98/44 -- -- 69 14 93 % -- --   01/30/24 2100 (!) 110/45 -- -- 66 15 94 % -- --   01/30/24 2011 -- 36.5 °C (97.7 °F) -- 68 17 97 % -- --   01/30/24 2002 (!) 95/43 -- -- 68 16 95 % -- --   01/30/24 2000 -- -- -- 69 15 95 % -- --   01/30/24 1902 140/62 -- -- 72 13 96 % -- --   01/30/24 1900 -- -- -- 73 (!) 29 94 % -- --   01/30/24 1815 -- -- -- 61 13 93 % -- --   01/30/24 1800 -- -- -- 64 16 92 % -- --   01/30/24 1745 -- -- -- 66 (!) 1 92 % -- --   01/30/24 1730 -- -- -- 64 15 98 % -- --   01/30/24 1715 -- -- -- 65 17 94 % -- --   01/30/24 1700 -- -- -- 64 17 96 % -- --   01/30/24 1645 -- -- -- 70 11 97 % -- --   01/30/24 1430 -- -- -- 63 18 97 % -- --   01/30/24 1415 138/65 -- -- 66 11 98 % -- --   01/30/24 1400 134/62 -- -- 60 11 98 % -- --   01/30/24 1345 136/72 -- -- 64 19 99 % 1.549 m (5' 0.98\") 70.8 kg (156 lb 1.4 oz)       Results for orders placed or performed during the hospital encounter of 01/30/24 (from the past 96 hour(s))   ECG 12 lead   Result Value Ref Range    Ventricular Rate 75 BPM    Atrial Rate 75 BPM    OK Interval 168 ms    QRS Duration 88 ms    QT Interval 442 ms    QTC Calculation(Bazett) 493 ms    R Axis -5 degrees    T Axis 27 degrees    QRS Count 13 beats    Q Onset 214 ms    P Onset 130 ms    P Offset 178 ms    T Offset 435 ms    QTC Fredericia 475 ms   Transthoracic Echo (TTE) Limited   Result Value Ref Range    AV pk radha 4.19 m/s    AV mn grad 44.0 mmHg    LVOT diam 2.10 cm    LV biplane EF 68 %    LA vol index A/L 43.2 ml/m2    " LVIDd 5.15 cm    Aortic Valve Area by Continuity of VTI 0.87 cm2    Aortic Valve Area by Continuity of Peak Velocity 0.81 cm2    AV pk grad 70.2 mmHg    LV A4C EF 62.2    ECG 12 lead daily   Result Value Ref Range    Ventricular Rate 70 BPM    Atrial Rate 74 BPM    QRS Duration 160 ms    QT Interval 506 ms    QTC Calculation(Bazett) 546 ms    R Axis -81 degrees    T Axis 73 degrees    QRS Count 12 beats    Q Onset 195 ms    T Offset 448 ms    QTC Fredericia 532 ms   Basic metabolic panel   Result Value Ref Range    Glucose 127 (H) 74 - 99 mg/dL    Sodium 140 136 - 145 mmol/L    Potassium 3.5 3.5 - 5.3 mmol/L    Chloride 103 98 - 107 mmol/L    Bicarbonate 30 21 - 32 mmol/L    Anion Gap 11 10 - 20 mmol/L    Urea Nitrogen 21 6 - 23 mg/dL    Creatinine 0.70 0.50 - 1.05 mg/dL    eGFR 87 >60 mL/min/1.73m*2    Calcium 8.8 8.6 - 10.6 mg/dL   Magnesium   Result Value Ref Range    Magnesium 1.78 1.60 - 2.40 mg/dL   CBC and Auto Differential   Result Value Ref Range    WBC 3.6 (L) 4.4 - 11.3 x10*3/uL    nRBC 0.0 0.0 - 0.0 /100 WBCs    RBC 3.55 (L) 4.00 - 5.20 x10*6/uL    Hemoglobin 11.0 (L) 12.0 - 16.0 g/dL    Hematocrit 32.6 (L) 36.0 - 46.0 %    MCV 92 80 - 100 fL    MCH 31.0 26.0 - 34.0 pg    MCHC 33.7 32.0 - 36.0 g/dL    RDW 13.2 11.5 - 14.5 %    Platelets 125 (L) 150 - 450 x10*3/uL    Neutrophils % 67.6 40.0 - 80.0 %    Immature Granulocytes %, Automated 0.6 0.0 - 0.9 %    Lymphocytes % 20.4 13.0 - 44.0 %    Monocytes % 9.4 2.0 - 10.0 %    Eosinophils % 1.4 0.0 - 6.0 %    Basophils % 0.6 0.0 - 2.0 %    Neutrophils Absolute 2.46 1.60 - 5.50 x10*3/uL    Immature Granulocytes Absolute, Automated 0.02 0.00 - 0.50 x10*3/uL    Lymphocytes Absolute 0.74 (L) 0.80 - 3.00 x10*3/uL    Monocytes Absolute 0.34 0.05 - 0.80 x10*3/uL    Eosinophils Absolute 0.05 0.00 - 0.40 x10*3/uL    Basophils Absolute 0.02 0.00 - 0.10 x10*3/uL   Protime-INR   Result Value Ref Range    Protime 14.8 (H) 9.8 - 12.8 seconds    INR 1.3 (H) 0.9 - 1.1   TSH    Result Value Ref Range    Thyroid Stimulating Hormone 1.61 0.44 - 3.98 mIU/L   Lipid Panel   Result Value Ref Range    Cholesterol 131 0 - 199 mg/dL    HDL-Cholesterol 51.1 mg/dL    Cholesterol/HDL Ratio 2.6     LDL Calculated 68 <=99 mg/dL    VLDL 12 0 - 40 mg/dL    Triglycerides 59 0 - 149 mg/dL    Non HDL Cholesterol 80 0 - 149 mg/dL   POCT GLUCOSE   Result Value Ref Range    POCT Glucose 217 (H) 74 - 99 mg/dL   CBC and Auto Differential   Result Value Ref Range    WBC 3.8 (L) 4.4 - 11.3 x10*3/uL    nRBC 0.0 0.0 - 0.0 /100 WBCs    RBC 3.13 (L) 4.00 - 5.20 x10*6/uL    Hemoglobin 9.3 (L) 12.0 - 16.0 g/dL    Hematocrit 28.4 (L) 36.0 - 46.0 %    MCV 91 80 - 100 fL    MCH 29.7 26.0 - 34.0 pg    MCHC 32.7 32.0 - 36.0 g/dL    RDW 13.2 11.5 - 14.5 %    Platelets 106 (L) 150 - 450 x10*3/uL    Neutrophils % 56.7 40.0 - 80.0 %    Immature Granulocytes %, Automated 0.3 0.0 - 0.9 %    Lymphocytes % 26.5 13.0 - 44.0 %    Monocytes % 14.4 2.0 - 10.0 %    Eosinophils % 1.6 0.0 - 6.0 %    Basophils % 0.5 0.0 - 2.0 %    Neutrophils Absolute 2.16 1.60 - 5.50 x10*3/uL    Immature Granulocytes Absolute, Automated 0.01 0.00 - 0.50 x10*3/uL    Lymphocytes Absolute 1.01 0.80 - 3.00 x10*3/uL    Monocytes Absolute 0.55 0.05 - 0.80 x10*3/uL    Eosinophils Absolute 0.06 0.00 - 0.40 x10*3/uL    Basophils Absolute 0.02 0.00 - 0.10 x10*3/uL   Magnesium   Result Value Ref Range    Magnesium 2.18 1.60 - 2.40 mg/dL   Protime-INR   Result Value Ref Range    Protime 12.3 9.8 - 12.8 seconds    INR 1.1 0.9 - 1.1   Comprehensive Metabolic Panel   Result Value Ref Range    Glucose 97 74 - 99 mg/dL    Sodium 140 136 - 145 mmol/L    Potassium 3.8 3.5 - 5.3 mmol/L    Chloride 103 98 - 107 mmol/L    Bicarbonate 30 21 - 32 mmol/L    Anion Gap 11 10 - 20 mmol/L    Urea Nitrogen 22 6 - 23 mg/dL    Creatinine 0.71 0.50 - 1.05 mg/dL    eGFR 86 >60 mL/min/1.73m*2    Calcium 8.6 8.6 - 10.6 mg/dL    Albumin 3.5 3.4 - 5.0 g/dL    Alkaline Phosphatase 41 33 - 136  U/L    Total Protein 5.8 (L) 6.4 - 8.2 g/dL    AST 18 9 - 39 U/L    Bilirubin, Total 0.4 0.0 - 1.2 mg/dL    ALT 7 7 - 45 U/L   ECG 12 lead daily   Result Value Ref Range    Ventricular Rate 73 BPM    Atrial Rate 73 BPM    TX Interval 178 ms    QRS Duration 118 ms    QT Interval 448 ms    QTC Calculation(Bazett) 493 ms    P Axis -34 degrees    R Axis 29 degrees    T Axis -5 degrees    QRS Count 12 beats    Q Onset 229 ms    P Onset 140 ms    P Offset 198 ms    T Offset 453 ms    QTC Fredericia 478 ms       EKG pre shows NSR Sylvia 160 QRS 84  EKG post shows NSR w IRBBB SYLVIA 174     Doing well clinically, euvolemic on exam. Bilat groins with DSD no evidence of bleeding, oozing, hematoma or ecchymosis.  No episodes of dropped or paced beats overnight.  QRS baseline with narrow appearance.  Will DC TVP and send out with [employer] monitor     Plan  -Ambulate and reassess groins  -Echo per protocol   -ASA 81mg as monotherapy  -DC TVP  -[employer] 30 day monitor  -Cont home medications   -D/C home   -Follow up with PCP in 1-2 weeks  -Follow up with Primary cards in 6-10 weeks  -Follow up with Structural NP in virtual clinic at 1 week, 1 month and 1 year with echo at 1 mo and year    D/w Dr. Carrasco    Appreciate excellent CICU care and management    I personally spent 55 minutes with this patient, of which >50% of time was spent counseling and coordination of care            Pertinent Physical Exam At Time of Discharge  Physical Exam  Constitutional: Well developed, awake/alert/oriented x3, no distress,  cooperative  Eyes: PERRL, EOMI, clear sclera  ENMT: mucous membranes moist, no apparent injury, no lesions seen  Head/Neck: Neck supple, no apparent injury, thyroid without mass or tenderness, No JVD, trachea midline, no bruits  Respiratory/Thorax: Patent airways,  good chest expansion, thorax symmetric, cta  Cardiovascular: Regular rate and rhythm, no murmurs, normal S 1and S 2  Gastrointestinal: Nondistended, soft,  non-tender, no rebound tenderness or guarding, no masses palpable, no organomegaly, +BS, no bruits  Extremities: normal extremities, no cyanosis,  bilat groins c/d/i with dsd no s/s of hematoma  Neurological: alert and oriented x3, intact senses, motor, response and reflexes, normal strength  Psychological: Appropriate mood and behavior   Skin: Warm and dry, intact   Home Medications     Medication List      CONTINUE taking these medications     acetaminophen 325 mg tablet; Commonly known as: Tylenol   aspirin 81 mg EC tablet   busPIRone 10 mg tablet; Commonly known as: Buspar; Take 1 tablet (10 mg)   by mouth 2 times a day.   cholecalciferol 25 MCG (1000 UT) tablet; Commonly known as: Vitamin D-3   fluticasone 50 mcg/actuation nasal spray; Commonly known as: Flonase   hydroCHLOROthiazide 25 mg tablet; Commonly known as: HYDRODiuril; Take 1   tablet (25 mg) by mouth once daily.   ipratropium 42 mcg (0.06 %) nasal spray; Commonly known as: Atrovent;   Administer 2 sprays into each nostril 3 times a day as needed for   rhinitis.   levothyroxine 50 mcg tablet; Commonly known as: Synthroid, Levoxyl; TAKE   ONE TABLET DAILY MONDAY THROUGH FRIDAY AND 2 TABLETS ON SATURDAY AND   SUNDAY   lovastatin 40 mg tablet; Commonly known as: Mevacor; TAKE 1 TABLET EVERY   DAY AT DINNER   omeprazole 20 mg DR capsule; Commonly known as: PriLOSEC; Take 1 capsule   (20 mg) by mouth once daily.   polyethylene glycol 17 gram/dose powder; Commonly known as: Glycolax,   Miralax   sertraline 50 mg tablet; Commonly known as: Zoloft; Take 1.5 tablets (75   mg) by mouth once daily.       Outpatient Follow-Up  Future Appointments   Date Time Provider Department Center   2/1/2024  3:00 AM [facility]   3/1/2024  1:30 PM  CONNIE [id number] CARD1 [id number] [facility]   3/20/2024 12:45 PM Fabiola Mccurdy MD [id number] [place]   5/22/2024  2:45 PM Timothy Bardales DO [id number] [place]   1/30/2025 10:30 AM  CONN[facility] [id number]  CARD1 [id number] [place]       Matt Nix, APRN-CNP

## 2024-01-31 NOTE — PROGRESS NOTES
Physical Therapy    Physical Therapy Evaluation & Treatment    Patient Name: Ayo Bueno  MRN: [id number]  Today's Date: 1/31/2024   Time Calculation  Start Time: 1122  Stop Time: 1145  Time Calculation (min): 23 min    Assessment/Plan   PT Assessment  PT Assessment Results: Decreased strength, Decreased endurance, Decreased mobility, Impaired balance  Rehab Prognosis: Excellent  End of Session Communication: Bedside nurse  End of Session Patient Position: Bed, 3 rail up, Alarm off, not on at start of session   IP OR SWING BED PT PLAN  Inpatient or Swing Bed: Inpatient  PT Plan  Treatment/Interventions: Bed mobility, Transfer training, Gait training, Stair training, Balance training, Strengthening, Endurance training  PT Plan: Skilled PT  PT Frequency: 3 times per week  PT Discharge Recommendations: No PT needed after discharge  PT Recommended Transfer Status: Assistive device, Stand by assist  PT - OK to Discharge: Yes    Subjective     General Visit Information:  General  Reason for Referral: s/p TAVR s/p TVP placement post-op  Past Medical History Relevant to Rehab: hypertension hyperlipidemia mild mitral stenosis; recent R hip surgery  Prior to Session Communication: Bedside nurse  Patient Position Received: Bed, 3 rail up, Alarm off, not on at start of session  General Comment: Pleasant and motivated; R IJ TVP at 50 bpm    Home Living:  Home Living  Type of Home: House  Lives With: Alone  Home Adaptive Equipment: Walker rolling or standard  Home Layout: Stairs to alternate level with rails (split level home)  Alternate Level Stairs-Rails:  (one handrail)  Alternate Level Stairs-Number of Steps: 7    Prior Level of Function:  Prior Function Per Pt/Caregiver Report  Level of [place]: Independent with ADLs and functional transfers  Receives Help From: Family (daughter comes to assist 1-2x/day)  Ambulatory Assistance: Independent  Gait: Independent (in-home ambulation without AD; uses FWW for community  distance ambulation. Denies recent fall)  Leisure: (-) drives- family can drive her as needed    Precautions:  Precautions  Medical Precautions: Fall precautions, Cardiac precautions    Vital Signs:  Vital Signs  Heart Rate:  (PRE: 73; POST: 77)  SpO2:  (PRE: 98%: POST 98%)  BP:  (PRE: 125/57; after first amb- 147/67)    Objective     Pain:  Pain Assessment  Pain Assessment: 0-10  Pain Score:  (pt not complaining of pain during session, but does have R hip discomfort from recent hip surgery)    Cognition:  Cognition  Overall Cognitive Status: Within Functional Limits (except pt stated she did not have a procedure- difficult to assess formally 2/2 language barrier)  Arousal/Alertness: Appropriate responses to stimuli  Orientation Level: Oriented X4  Attention: Within Functional Limits    General Assessments:   Activity Tolerance  Early Mobility/Exercise Safety Screen: Proceed with mobilization - No exclusion criteria met    Sensation  Light Touch: No apparent deficits    Strength  Strength Comments: B LE strength >/= 3+/5 throughout  Strength  Strength Comments: B LE strength >/= 3+/5 throughout    Static Sitting Balance  Static Sitting-Balance Support: No upper extremity supported, Feet supported  Static Sitting-Level of Assistance: Close supervision  Dynamic Sitting Balance  Dynamic Sitting-Balance Support: No upper extremity supported, Feet supported  Dynamic Sitting-Comments: close supervision    Static Standing Balance  Static Standing-Balance Support: Left upper extremity supported  Static Standing-Level of Assistance: Contact guard  Dynamic Standing Balance  Dynamic Standing-Balance Support: Left upper extremity supported  Dynamic Standing-Balance:  (CGA)    Functional Assessments:  Bed Mobility  Bed Mobility: Yes  Bed Mobility 1  Bed Mobility 1: Supine to sitting  Level of Assistance 1: Minimum assistance (x1 person; cues for sequencing)  Bed Mobility Comments 1: HOB elevated  Bed Mobility 2  Bed Mobility  2:  Sitting to supine  Level of Assistance 2: Close supervision  Bed Mobility Comments 2: HOB elevated    Transfers  Transfer: Yes  Transfer 1  Transfer From 1: Sit to  Transfer to 1: Stand  Technique 1: Sit to stand  Transfer Level of Assistance 1: Contact guard  Transfers 2  Transfer From 2: Stand to  Transfer to 2: Sit  Technique 2: Stand to sit  Transfer Level of Assistance 2: Contact guard    Ambulation/Gait Training  Ambulation/Gait Training Performed: Yes  Ambulation/Gait Training 1  Surface 1: Level tile  Device 1: No device  Assistance 1: Contact guard  Quality of Gait 1: Narrow base of support  Comments/Distance (ft) 1: 2 x 5 ft with seated break    Extremity/Trunk Assessments:  RLE   RLE : Within Functional Limits  LLE   LLE : Within Functional Limits    Treatments:  Therapeutic Activity  Therapeutic Activity Performed: Yes  Therapeutic Activity 1: Pt completed sit<->stand with no AD and CGA  Therapeutic Activity 2: Pt ambulated with FWW 50 ft with CGA and more fluid gait pattern    Outcome Measures:  [facility] Basic Mobility  Turning from your back to your side while in a flat bed without using bedrails: None  Moving from lying on your back to sitting on the side of a flat bed without using bedrails: A little  Moving to and from bed to chair (including a wheelchair): A little  Standing up from a chair using your arms (e.g. wheelchair or bedside chair): A little  To walk in hospital room: A little  Climbing 3-5 steps with railing: A little  Basic Mobility - Total Score: 19    Confusion Assessment Method-ICU (CAM-ICU)  Feature 1: Acute Onset or Fluctuating Course: Negative  Overall CAM-ICU: Negative    FSS-ICU  Ambulation: Walks >/ or equal to 50 feet with any assistance x1  Rolling: Complete independence  Sitting: Complete independence  Transfer Sit-to-Stand: Minimal assistance (performs 75% or more of task)  Transfer Supine-to-Sit: Minimal assistance (performs 75% or more of task)  Total Score: 24    ICU  Mobility Screen  Early Mobility/Exercise Safety Screen: Proceed with mobilization - No exclusion criteria met  E = Exercise and Early Mobility  Early Mobility/Exercise Safety Screen: Proceed with mobilization - No exclusion criteria met  Current Activity: Ambulating in velez    Encounter Problems       Encounter Problems (Active)       Balance       Pt will score >24 on [place] for low risk of falls (Progressing)       Start:  01/31/24    Expected End:  02/14/24               Mobility       Patient will ambulate >250 ft with LRAD and supervision (Progressing)       Start:  01/31/24    Expected End:  02/14/24            Patient will ascend and descend 7 stairs with handrail and supervision (Progressing)       Start:  01/31/24    Expected End:  02/14/24               Transfers       Patient to transfer to and from sit to supine indep (Progressing)       Start:  01/31/24    Expected End:  02/14/24            Patient will transfer sit to and from stand with LRAD and supervision (Progressing)       Start:  01/31/24    Expected End:  02/14/24                 Education Documentation  Mobility Training, taught by Tori Pinto PT at 1/31/2024  1:22 PM.  Learner: Patient  Readiness: Acceptance  Method: Explanation  Response: Verbalizes Understanding    Education Comments  No comments found.    Signed by Tori Pinto DPT

## 2024-01-31 NOTE — PROGRESS NOTES
"Ayo Bueno is a 83 y.o. female on day 1 of admission presenting with Aortic stenosis.    Subjective   NAEON. Patient in sinus this morning. Denies chest pain or shortness of breath. No groin site pain.     Objective     Physical Exam  Constitutional:       Appearance: Normal appearance.   Neck:      Comments: [facility] CVC (TVP) in place  Cardiovascular:      Rate and Rhythm: Normal rate and regular rhythm.      Pulses: Normal pulses.      Heart sounds: Normal heart sounds.      Comments: Intermittently paced  Pulmonary:      Effort: Pulmonary effort is normal.      Breath sounds: Normal breath sounds.   Musculoskeletal:      Comments: Bilateral groin soft with no signs of swelling    Neurological:      General: No focal deficit present.      Mental Status: She is alert and oriented to person, place, and time. Mental status is at baseline.     Last Recorded Vitals  Blood pressure 128/67, pulse 73, temperature 36.5 °C (97.7 °F), temperature source Temporal, resp. rate 16, height 1.549 m (5' 0.98\"), weight 70.8 kg (156 lb 1.4 oz), SpO2 95 %.  Intake/Output last 3 Shifts:  I/O last 3 completed shifts:  In: 553.8 (7.8 mL/kg) [I.V.:453.8 (6.4 mL/kg); IV Piggyback:100]  Out: 805 (11.4 mL/kg) [Urine:800 (0.3 mL/kg/hr); Blood:5]  Weight: 70.8 kg     Relevant Results  Results for orders placed or performed during the hospital encounter of 01/30/24 (from the past 24 hour(s))   Transthoracic Echo (TTE) Limited   Result Value Ref Range    AV pk radha 4.19 m/s    AV mn grad 44.0 mmHg    LVOT diam 2.10 cm    LV biplane EF 68 %    LA vol index A/L 43.2 ml/m2    LVIDd 5.15 cm    Aortic Valve Area by Continuity of VTI 0.87 cm2    Aortic Valve Area by Continuity of Peak Velocity 0.81 cm2    AV pk grad 70.2 mmHg    LV A4C EF 62.2    ECG 12 lead daily   Result Value Ref Range    Ventricular Rate 70 BPM    Atrial Rate 74 BPM    QRS Duration 160 ms    QT Interval 506 ms    QTC Calculation(Bazett) 546 ms    R Axis -81 degrees    T Axis " 73 degrees    QRS Count 12 beats    Q Onset 195 ms    T Offset 448 ms    QTC Fredericia 532 ms   Basic metabolic panel   Result Value Ref Range    Glucose 127 (H) 74 - 99 mg/dL    Sodium 140 136 - 145 mmol/L    Potassium 3.5 3.5 - 5.3 mmol/L    Chloride 103 98 - 107 mmol/L    Bicarbonate 30 21 - 32 mmol/L    Anion Gap 11 10 - 20 mmol/L    Urea Nitrogen 21 6 - 23 mg/dL    Creatinine 0.70 0.50 - 1.05 mg/dL    eGFR 87 >60 mL/min/1.73m*2    Calcium 8.8 8.6 - 10.6 mg/dL   Magnesium   Result Value Ref Range    Magnesium 1.78 1.60 - 2.40 mg/dL   CBC and Auto Differential   Result Value Ref Range    WBC 3.6 (L) 4.4 - 11.3 x10*3/uL    nRBC 0.0 0.0 - 0.0 /100 WBCs    RBC 3.55 (L) 4.00 - 5.20 x10*6/uL    Hemoglobin 11.0 (L) 12.0 - 16.0 g/dL    Hematocrit 32.6 (L) 36.0 - 46.0 %    MCV 92 80 - 100 fL    MCH 31.0 26.0 - 34.0 pg    MCHC 33.7 32.0 - 36.0 g/dL    RDW 13.2 11.5 - 14.5 %    Platelets 125 (L) 150 - 450 x10*3/uL    Neutrophils % 67.6 40.0 - 80.0 %    Immature Granulocytes %, Automated 0.6 0.0 - 0.9 %    Lymphocytes % 20.4 13.0 - 44.0 %    Monocytes % 9.4 2.0 - 10.0 %    Eosinophils % 1.4 0.0 - 6.0 %    Basophils % 0.6 0.0 - 2.0 %    Neutrophils Absolute 2.46 1.60 - 5.50 x10*3/uL    Immature Granulocytes Absolute, Automated 0.02 0.00 - 0.50 x10*3/uL    Lymphocytes Absolute 0.74 (L) 0.80 - 3.00 x10*3/uL    Monocytes Absolute 0.34 0.05 - 0.80 x10*3/uL    Eosinophils Absolute 0.05 0.00 - 0.40 x10*3/uL    Basophils Absolute 0.02 0.00 - 0.10 x10*3/uL   Protime-INR   Result Value Ref Range    Protime 14.8 (H) 9.8 - 12.8 seconds    INR 1.3 (H) 0.9 - 1.1   TSH   Result Value Ref Range    Thyroid Stimulating Hormone 1.61 0.44 - 3.98 mIU/L   Lipid Panel   Result Value Ref Range    Cholesterol 131 0 - 199 mg/dL    HDL-Cholesterol 51.1 mg/dL    Cholesterol/HDL Ratio 2.6     LDL Calculated 68 <=99 mg/dL    VLDL 12 0 - 40 mg/dL    Triglycerides 59 0 - 149 mg/dL    Non HDL Cholesterol 80 0 - 149 mg/dL   POCT GLUCOSE   Result Value Ref  Range    POCT Glucose 217 (H) 74 - 99 mg/dL   CBC and Auto Differential   Result Value Ref Range    WBC 3.8 (L) 4.4 - 11.3 x10*3/uL    nRBC 0.0 0.0 - 0.0 /100 WBCs    RBC 3.13 (L) 4.00 - 5.20 x10*6/uL    Hemoglobin 9.3 (L) 12.0 - 16.0 g/dL    Hematocrit 28.4 (L) 36.0 - 46.0 %    MCV 91 80 - 100 fL    MCH 29.7 26.0 - 34.0 pg    MCHC 32.7 32.0 - 36.0 g/dL    RDW 13.2 11.5 - 14.5 %    Platelets 106 (L) 150 - 450 x10*3/uL    Neutrophils % 56.7 40.0 - 80.0 %    Immature Granulocytes %, Automated 0.3 0.0 - 0.9 %    Lymphocytes % 26.5 13.0 - 44.0 %    Monocytes % 14.4 2.0 - 10.0 %    Eosinophils % 1.6 0.0 - 6.0 %    Basophils % 0.5 0.0 - 2.0 %    Neutrophils Absolute 2.16 1.60 - 5.50 x10*3/uL    Immature Granulocytes Absolute, Automated 0.01 0.00 - 0.50 x10*3/uL    Lymphocytes Absolute 1.01 0.80 - 3.00 x10*3/uL    Monocytes Absolute 0.55 0.05 - 0.80 x10*3/uL    Eosinophils Absolute 0.06 0.00 - 0.40 x10*3/uL    Basophils Absolute 0.02 0.00 - 0.10 x10*3/uL   Magnesium   Result Value Ref Range    Magnesium 2.18 1.60 - 2.40 mg/dL   Protime-INR   Result Value Ref Range    Protime 12.3 9.8 - 12.8 seconds    INR 1.1 0.9 - 1.1   Comprehensive Metabolic Panel   Result Value Ref Range    Glucose 97 74 - 99 mg/dL    Sodium 140 136 - 145 mmol/L    Potassium 3.8 3.5 - 5.3 mmol/L    Chloride 103 98 - 107 mmol/L    Bicarbonate 30 21 - 32 mmol/L    Anion Gap 11 10 - 20 mmol/L    Urea Nitrogen 22 6 - 23 mg/dL    Creatinine 0.71 0.50 - 1.05 mg/dL    eGFR 86 >60 mL/min/1.73m*2    Calcium 8.6 8.6 - 10.6 mg/dL    Albumin 3.5 3.4 - 5.0 g/dL    Alkaline Phosphatase 41 33 - 136 U/L    Total Protein 5.8 (L) 6.4 - 8.2 g/dL    AST 18 9 - 39 U/L    Bilirubin, Total 0.4 0.0 - 1.2 mg/dL    ALT 7 7 - 45 U/L   ECG 12 lead daily   Result Value Ref Range    Ventricular Rate 73 BPM    Atrial Rate 73 BPM    NH Interval 178 ms    QRS Duration 118 ms    QT Interval 448 ms    QTC Calculation(Bazett) 493 ms    P Axis -34 degrees    R Axis 29 degrees    T Axis -5  degrees    QRS Count 12 beats    Q Onset 229 ms    P Onset 140 ms    P Offset 198 ms    T Offset 453 ms    QTC Fredericia 478 ms          Assessment/Plan   Principal Problem:    Aortic stenosis  Active Problems:    S/P TAVR (transcatheter aortic valve replacement)    Ayo Bueno is a 83 y.o. female admitted to CICU post-TAVR for monitoring. She tolerated the procedure well, and is in CICU for routine post-TAVR monitoring. Patient doing well, intrinsic rhythm, plan for discharge.    Updates:  -Patient doing well, intrinsic rhythm  -Structural cardiology following, plan for discharge today with event monitor.         NEURO  #Depression  -c/w sertaline and buspirone      CARDIO  #Aortic Stenosis  #TAVR  ::TTE 10/2023: A V-max 4.9 m/s Peak/mean gradient 95/55 mmHg NEHEMIAH 0.8 cmï  ::s/p TAVR 1/30/2024, post gradient TTE 3mm Hg  -Bed rest 4 hours  -c/w aspirin   -repeat TTE: trivial AI, gradient 4mm Hg     #HTN  -Hold home hydrochlorothiazide 25mg,      #HLD  ::On lovastatin 40 at home  -c/w atorvastatin 10mg     PULM  No active issues     GI  -PPI  -Stool regimen     ENDO  #Hypothyroidism  -c/w levothyroxine 50mcg     F: PRN  E: PRN   N: cardiac  A: PIV  DVT: lovenox  GI:home PPI     Code status: Full Code (confirmed on admission)  NOK: Daughter Elham [phone]      Mega Pate MD

## 2024-01-31 NOTE — PROGRESS NOTES
Occupational Therapy    Evaluation    Patient Name: Ayo Bueno  MRN: [id number]  Today's Date: 1/31/2024  Time Calculation  Start Time: 1121  Stop Time: 1144  Time Calculation (min): 23 min    Assessment  IP OT Assessment  OT Assessment: Patient safe to discharge to home with supportive family to assist as needed. Pt. demo ability to don briefs and pants and toilet transfer with CGA overall. Functional mobility with SBA with WW. No further OT needs with plan to d/c to home today.  Prognosis: Excellent  Barriers to Discharge: None  Evaluation/Treatment Tolerance: Patient tolerated treatment well  Medical Staff Made Aware: Yes  End of Session Communication: Bedside nurse  End of Session Patient Position: Bed, 3 rail up, Alarm off, not on at start of session  Plan:  No Skilled OT: Safe to return home  OT Frequency: OT eval only  OT Discharge Recommendations: No further acute OT, No OT needed after discharge  OT - OK to Discharge: Yes    Subjective   Current Problem:  1. S/P TAVR (transcatheter aortic valve replacement)  Transthoracic Echo (TTE) Complete    Basic Metabolic Panel    CBC    Transthoracic Echo (TTE) Limited    Transthoracic Echo (TTE) Limited    Holter Or Event Cardiac Monitor    Holter Or Event Cardiac Monitor      2. Nonrheumatic aortic valve stenosis  Structural heart procedure    Structural heart procedure    Transthoracic Echo (TTE) Limited    Transthoracic Echo (TTE) Limited    Cardiac catheterization - coronary    Cardiac catheterization - coronary      3. CHB (complete heart block) (CMS/HCC)  Holter Or Event Cardiac Monitor    Holter Or Event Cardiac Monitor        General:  General  Reason for Referral: 82 y/o F s/p TAVR with TVP placement post op.  Past Medical History Relevant to Rehab: HTN, HLD, mild mitral stenosis, recent R hip surgery.  Family/Caregiver Present: Yes  Caregiver Feedback: supportive family present at start of session; stepping out for therapy session.  Co-Treatment:  PT  Co-Treatment Reason: co-treat to maximize safey and optimize pt discharge.  Prior to Session Communication: Bedside nurse  Patient Position Received: Bed, 3 rail up, Alarm off, not on at start of session  General Comment: Pleasant and agreeable. Pt. speaks conversational English and declined need for  assist. (R IJ TVP at 50 bpm)  Precautions:  Medical Precautions: Fall precautions, Cardiac precautions  Vital Signs:  Heart Rate: 73  SpO2: 98 %  BP: 125/57  Pain:  Pain Assessment  Pain Assessment: 0-10  Pain Score:  (denies pain; reporting some discomfort in hip with recent sx.)    Objective   Cognition:  Overall Cognitive Status: Within Functional Limits  Arousal/Alertness: Appropriate responses to stimuli  Orientation Level: Disoriented to situation (aware she was in hospital. denies having procedure/surgery while in hospital. Difficult to determine if this was due to language versus health literacy.)  Attention: Within Functional Limits  Problem Solving: Within Functional Limits  Safety/Judgement: Within Functional Limits        Keith Agitation Sedation Scale  Keith Agitation Sedation Scale (RASS): Alert and calm  Home Living:  Type of Home: House  Lives With: Alone (spouse recently passed away)  Home Adaptive Equipment: Walker rolling or standard  Home Layout: Stairs to alternate level with rails (split level home)  Alternate Level Stairs-Rails:  (one handrail)  Alternate Level Stairs-Number of Steps: 7   Prior Function:  Level of [place]: Independent with ADLs and functional transfers, Independent with homemaking with ambulation  Receives Help From: Family (Daughter visits 1-2times per day. Brother also lives in [place].)  Prior Function Comments: Pt. does not drive. Family completes driving. Pt. IND with ADLs, IADLs with supportive family to assist if needed.     ADL:  Eating Assistance: Modified independent (Device)  Eating Deficit: Setup  Grooming Assistance: Modified independent  (Device)  Grooming Deficit: Setup  Bathing Assistance: Stand by  Bathing Deficit: Verbal cueing, Supervision/safety  UE Dressing Assistance: Stand by  UE Dressing Deficit: Supervision/safety, Verbal cueing  LE Dressing Assistance: Stand by (CGA)  Toileting Assistance with Device: Stand by (CGA)     Bed Mobility/Transfers: Bed Mobility  Bed Mobility: Yes  Bed Mobility 1  Bed Mobility 1: Supine to sitting  Level of Assistance 1: Minimum assistance  Bed Mobility Comments 1: HOB elevated and cues for sequencing. Assist with line management  Bed Mobility 2  Bed Mobility  2: Sitting to supine  Level of Assistance 2: Close supervision  Bed Mobility Comments 2: HOB elevated    Transfers  Transfer: Yes  Transfer 1  Transfer From 1: Sit to, Stand to  Transfer to 1: Sit, Stand  Technique 1: Sit to stand, Stand to sit  Transfer Level of Assistance 1: Contact guard    Ambulation/Gait Training:  Ambulation/Gait Training  Ambulation/Gait Training Performed: Yes  Ambulation/Gait Training 1  Comments/Distance (ft) 1: Functional mobility in unit hallway with CGA to SBA with WW and no LOB.  Sitting Balance:  Static Sitting Balance  Static Sitting-Comment/Number of Minutes: supervision  Standing Balance:  Static Standing Balance  Static Standing-Comment/Number of Minutes: CGA to SBA  Dynamic Standing Balance  Dynamic Standing-Comments: CGA to SBA     Vision: Vision - Basic Assessment  Current Vision: No visual deficits     Strength:  Strength Comments: BUEs WFL        Hand Function:  Hand Function  Gross Grasp: Functional  Coordination: Functional  Extremities: RUE   RUE :  (WFL except shoulder flexion assessment limited 2/2 TVP placement.) and LUE   LUE: Within Functional Limits    Outcome Measures: [facility] Daily Activity  Putting on and taking off regular lower body clothing: A little  Bathing (including washing, rinsing, drying): A little  Putting on and taking off regular upper body clothing: A little  Toileting, which includes using  toilet, bedpan or urinal: A little  Taking care of personal grooming such as brushing teeth: None  Eating Meals: None  Daily Activity - Total Score: 20       Confusion Assessment Method-ICU (CAM-ICU)  Feature 1: Acute Onset or Fluctuating Course: Negative  Feature 3: Altered Level of Consciousness: Negative  Overall CAM-ICU: Negative  Education Documentation  Body Mechanics, taught by Sara Tobias OT at 1/31/2024  2:06 PM.  Learner: Patient  Readiness: Acceptance  Method: Explanation  Response: Verbalizes Understanding    Precautions, taught by Sara Tobias OT at 1/31/2024  2:06 PM.  Learner: Patient  Readiness: Acceptance  Method: Explanation  Response: Verbalizes Understanding    ADL Training, taught by Sara Tobias OT at 1/31/2024  2:06 PM.  Learner: Patient  Readiness: Acceptance  Method: Explanation  Response: Verbalizes Understanding    Education Comments  No comments found.       Sara Tobias OT

## 2024-02-01 ENCOUNTER — PATIENT OUTREACH (OUTPATIENT)
Dept: CARE COORDINATION | Facility: CLINIC | Age: 84
End: 2024-02-01
Payer: MEDICARE

## 2024-02-01 NOTE — PROGRESS NOTES
Discharge Facility:[facility] [place]  Discharge Diagnosis: S/P TAVR, Complete Heart Block  Admission Date:1/30/2024  Discharge Date: 1/31/2024    PCP Appointment Date:Declined  Specialist Appointment Date:  3/20/2024  Hospital Encounter and Summary: Linked   See discharge assessment below for further details  Engagement  Call Start Time: 1337 (2/1/2024  2:11 PM)    Medications  Medications reviewed with patient/caregiver?: Yes (2/1/2024  2:11 PM)  Is the patient having any side effects they believe may be caused by any medication additions or changes?: No (No medication changes) (2/1/2024  2:11 PM)  Does the patient have all medications ordered at discharge?: Not applicable (2/1/2024  2:11 PM)  Care Management Interventions: No intervention needed (2/1/2024  2:11 PM)  Is the patient taking all medications as directed (includes completed medication regime)?: Yes (2/1/2024  2:11 PM)  Care Management Interventions: Provided patient education (Discussed wound care to punture site in groin. Ayo pulled bandages off. D/W daughter to keep clean and dry as best as she can and keep eye on for signs of infection. To report any changes if needs.) (2/1/2024  2:11 PM)    Appointments  Does the patient have a primary care provider?: Yes (2/1/2024  2:11 PM)  Care Management Interventions: -- (Declined PCP fu) (2/1/2024  2:11 PM)  Has the patient kept scheduled appointments due by today?: Yes (2/1/2024  2:11 PM)    Self Management  What is the home health agency?: -- (N/A) (2/1/2024  2:11 PM)  What Durable Medical Equipment (DME) was ordered?: -- (N/A) (2/1/2024  2:11 PM)    Patient Teaching  Does the patient have access to their discharge instructions?: Yes (2/1/2024  2:11 PM)  What is the patient's perception of their health status since discharge?: Improving (2/1/2024  2:11 PM)  Is the patient/caregiver able to teach back the hierarchy of who to call/visit for symptoms/problems? PCP, Specialist, Home Health nurse, Urgent Care,  ED, 911: Yes (2/1/2024  2:11 PM)    Wrap Up  Wrap Up Additional Comments: -- (Spoke with daughter Elham. She says things are going well other than the bandage issue which she will keep any eye on. They will fu with Cardiology.) (2/1/2024  2:11 PM)

## 2024-02-05 ENCOUNTER — LAB (OUTPATIENT)
Dept: LAB | Facility: LAB | Age: 84
End: 2024-02-05
Payer: MEDICARE

## 2024-02-05 DIAGNOSIS — Z95.2 S/P TAVR (TRANSCATHETER AORTIC VALVE REPLACEMENT): ICD-10-CM

## 2024-02-05 LAB
ANION GAP SERPL CALC-SCNC: 13 MMOL/L (ref 10–20)
BUN SERPL-MCNC: 24 MG/DL (ref 6–23)
CALCIUM SERPL-MCNC: 9.1 MG/DL (ref 8.6–10.3)
CHLORIDE SERPL-SCNC: 101 MMOL/L (ref 98–107)
CO2 SERPL-SCNC: 31 MMOL/L (ref 21–32)
CREAT SERPL-MCNC: 0.8 MG/DL (ref 0.5–1.05)
EGFRCR SERPLBLD CKD-EPI 2021: 74 ML/MIN/1.73M*2
ERYTHROCYTE [DISTWIDTH] IN BLOOD BY AUTOMATED COUNT: 13.4 % (ref 11.5–14.5)
GLUCOSE SERPL-MCNC: 106 MG/DL (ref 74–99)
HCT VFR BLD AUTO: 35.4 % (ref 36–46)
HGB BLD-MCNC: 11.5 G/DL (ref 12–16)
MCH RBC QN AUTO: 30.3 PG (ref 26–34)
MCHC RBC AUTO-ENTMCNC: 32.5 G/DL (ref 32–36)
MCV RBC AUTO: 93 FL (ref 80–100)
NRBC BLD-RTO: 0 /100 WBCS (ref 0–0)
PLATELET # BLD AUTO: 148 X10*3/UL (ref 150–450)
POTASSIUM SERPL-SCNC: 4.6 MMOL/L (ref 3.5–5.3)
RBC # BLD AUTO: 3.79 X10*6/UL (ref 4–5.2)
SODIUM SERPL-SCNC: 140 MMOL/L (ref 136–145)
WBC # BLD AUTO: 2.7 X10*3/UL (ref 4.4–11.3)

## 2024-02-05 PROCEDURE — 36415 COLL VENOUS BLD VENIPUNCTURE: CPT

## 2024-02-05 PROCEDURE — 80048 BASIC METABOLIC PNL TOTAL CA: CPT

## 2024-02-05 PROCEDURE — 85027 COMPLETE CBC AUTOMATED: CPT

## 2024-02-10 LAB
ATRIAL RATE: 75 BPM
P OFFSET: 178 MS
P ONSET: 130 MS
PR INTERVAL: 168 MS
Q ONSET: 214 MS
QRS COUNT: 13 BEATS
QRS DURATION: 88 MS
QT INTERVAL: 442 MS
QTC CALCULATION(BAZETT): 493 MS
QTC FREDERICIA: 475 MS
R AXIS: -5 DEGREES
T AXIS: 27 DEGREES
T OFFSET: 435 MS
VENTRICULAR RATE: 75 BPM

## 2024-02-15 ENCOUNTER — PATIENT OUTREACH (OUTPATIENT)
Dept: CARE COORDINATION | Facility: CLINIC | Age: 84
End: 2024-02-15
Payer: MEDICARE

## 2024-02-15 DIAGNOSIS — K21.9 GASTROESOPHAGEAL REFLUX DISEASE, UNSPECIFIED WHETHER ESOPHAGITIS PRESENT: ICD-10-CM

## 2024-02-15 RX ORDER — OMEPRAZOLE 20 MG/1
20 CAPSULE, DELAYED RELEASE ORAL DAILY
Qty: 90 CAPSULE | Refills: 3 | Status: SHIPPED | OUTPATIENT
Start: 2024-02-15 | End: 2025-02-14

## 2024-02-15 NOTE — TELEPHONE ENCOUNTER
Pt daughter left  requesting refill for omeprazole (PriLOSEC) 20 mg DR capsule to be sent to pharmacy.

## 2024-02-15 NOTE — PROGRESS NOTES
Unable to reach patient for call back after patient's follow up appointment with PCP.   CHEMOM with call back number for patient to call if needed   If no voicemail available call attempts x 2 were made to contact the patient to assist with any questions or concerns patient may have.

## 2024-02-20 VITALS
SYSTOLIC BLOOD PRESSURE: 152 MMHG | WEIGHT: 156.09 LBS | BODY MASS INDEX: 29.47 KG/M2 | HEART RATE: 72 BPM | DIASTOLIC BLOOD PRESSURE: 68 MMHG | RESPIRATION RATE: 14 BRPM | TEMPERATURE: 36.5 F | HEIGHT: 61 IN

## 2024-02-26 ENCOUNTER — APPOINTMENT (OUTPATIENT)
Dept: CARDIOLOGY | Facility: CLINIC | Age: 84
End: 2024-02-26
Payer: MEDICARE

## 2024-02-26 ENCOUNTER — HOSPITAL ENCOUNTER (OUTPATIENT)
Dept: CARDIOLOGY | Facility: CLINIC | Age: 84
Discharge: HOME | End: 2024-02-26
Payer: MEDICARE

## 2024-02-26 DIAGNOSIS — Z95.2 S/P TAVR (TRANSCATHETER AORTIC VALVE REPLACEMENT): ICD-10-CM

## 2024-02-26 PROCEDURE — 93306 TTE W/DOPPLER COMPLETE: CPT

## 2024-02-26 PROCEDURE — 93306 TTE W/DOPPLER COMPLETE: CPT | Performed by: INTERNAL MEDICINE

## 2024-02-28 LAB
AORTIC VALVE MEAN GRADIENT: 6.8 MMHG
AORTIC VALVE PEAK VELOCITY: 1.98 M/S
AV PEAK GRADIENT: 15.7 MMHG
AVA (PEAK VEL): 1.71 CM2
AVA (VTI): 2.03 CM2
EJECTION FRACTION APICAL 4 CHAMBER: 65.2
EJECTION FRACTION: 64 %
LEFT VENTRICLE INTERNAL DIMENSION DIASTOLE: 3.8 CM (ref 3.5–6)
LEFT VENTRICULAR OUTFLOW TRACT DIAMETER: 2.05 CM
RIGHT VENTRICLE FREE WALL PEAK S': 0.11 CM/S
RIGHT VENTRICLE PEAK SYSTOLIC PRESSURE: 28.8 MMHG
TRICUSPID ANNULAR PLANE SYSTOLIC EXCURSION: 1.6 CM

## 2024-03-01 ENCOUNTER — TELEMEDICINE (OUTPATIENT)
Dept: CARDIOLOGY | Facility: HOSPITAL | Age: 84
End: 2024-03-01
Payer: MEDICARE

## 2024-03-01 DIAGNOSIS — Z95.2 S/P TAVR (TRANSCATHETER AORTIC VALVE REPLACEMENT): Primary | ICD-10-CM

## 2024-03-01 DIAGNOSIS — F32.A DEPRESSION, UNSPECIFIED DEPRESSION TYPE: ICD-10-CM

## 2024-03-01 DIAGNOSIS — I44.2 COMPLETE HEART BLOCK (MULTI): ICD-10-CM

## 2024-03-01 DIAGNOSIS — I44.2: ICD-10-CM

## 2024-03-01 PROBLEM — I35.0 AORTIC STENOSIS: Chronic | Status: RESOLVED | Noted: 2023-11-15 | Resolved: 2024-03-01

## 2024-03-01 PROCEDURE — 1111F DSCHRG MED/CURRENT MED MERGE: CPT | Performed by: NURSE PRACTITIONER

## 2024-03-01 PROCEDURE — 1036F TOBACCO NON-USER: CPT | Performed by: NURSE PRACTITIONER

## 2024-03-01 PROCEDURE — 1159F MED LIST DOCD IN RCRD: CPT | Performed by: NURSE PRACTITIONER

## 2024-03-01 PROCEDURE — 99215 OFFICE O/P EST HI 40 MIN: CPT | Performed by: NURSE PRACTITIONER

## 2024-03-01 PROCEDURE — 1125F AMNT PAIN NOTED PAIN PRSNT: CPT | Performed by: NURSE PRACTITIONER

## 2024-03-01 RX ORDER — SERTRALINE HYDROCHLORIDE 50 MG/1
100 TABLET, FILM COATED ORAL DAILY
Qty: 135 TABLET | Refills: 0 | COMMUNITY
Start: 2024-03-01

## 2024-03-01 RX ORDER — AMOXICILLIN 500 MG/1
CAPSULE ORAL
Qty: 4 CAPSULE | Refills: 5 | Status: SHIPPED | OUTPATIENT
Start: 2024-03-01

## 2024-03-01 NOTE — PROGRESS NOTES
Structural Heart Follow up visit      Ayo Simmons is a 83 y.o. female presents today for 1 month follow up s/p TAVR    denies SOB,LAL, fatigue  Recent Hospitalizations  No    patient with   Past Medical History:   Diagnosis Date    Aortic stenosis 11/15/2023    moderate    Benign neoplasm, unspecified site     Adenoma    Corns and callosities     Pre-ulcerative corn or callous    Hyperlipidemia 02/23/2023    Hypertension 02/23/2023    Impingement syndrome of unspecified shoulder     Shoulder impingement syndrome    Mitral stenosis 11/15/2023    Personal history of other diseases of the nervous system and sense organs     History of carpal tunnel syndrome    Personal history of other diseases of urinary system     History of hematuria    Personal history of other endocrine, nutritional and metabolic disease     History of hypokalemia    Personal history of other endocrine, nutritional and metabolic disease     History of obesity    Personal history of other endocrine, nutritional and metabolic disease     History of hyperlipidemia    Personal history of other endocrine, nutritional and metabolic disease     History of hypothyroidism    Personal history of other specified conditions     History of nausea    Shortness of breath 11/15/2023    Unspecified tear of unspecified meniscus, current injury, left knee, initial encounter     Tear of cartilage of left knee       No results found for this or any previous visit (from the past 96 hour(s)).     Transthoracic Echo (TTE) Complete    Result Date: 2/28/2024    [facility], [address] [place] [address] [id number] [phone] and                                 Fax [phone] TRANSTHORACIC ECHOCARDIOGRAM REPORT  Patient Name:      AYO           Pipe Physician:    [id number] Fabiola SIMMONS Study Date:        2/26/2024            Ordering Provider:    [id number] ALBRECHT                                                                KEKE MRN/PID:           [id number]             Fellow: Accession#:        [id number]         Nurse: Date of Birth/Age: 1940 / 83 years Sonographer:          Natalia Drew                                                               RDCS Gender:            F                    Additional Staff: Height:            154.94 cm            Admit Date: Weight:            71.67 kg             Admission Status: BSA / BMI:         1.71 m2 / 29.85      Encounter#:           [id number]                    kg/m2                                         Department Location:  [facility] Blood Pressure: 130 /56 mmHg Study Type:    TRANSTHORACIC ECHO (TTE) COMPLETE Diagnosis/ICD: Presence of prosthetic heart valve-Z95.2 Indication:    S/P TAVR 29mm Medtronic Evolut 1/30/2024. CPT Code:      Echo Complete w Full Doppler-42879  Study Detail: The following Echo studies were performed: 2D, Doppler, M-Mode and               color flow.  PHYSICIAN INTERPRETATION: Left Ventricle: The left ventricular systolic function is normal, with an estimated ejection fraction of 60-65%. There are no regional wall motion abnormalities. The left ventricular cavity size is normal. There is mild concentric left ventricular hypertrophy. Spectral Doppler shows an impaired relaxation pattern of left ventricular diastolic filling. Left Atrium: The left atrium is mild to moderately dilated. Right Ventricle: The right ventricle is normal in size. There is normal right ventricular global systolic function. Right Atrium: The right atrium is normal in size. Aortic Valve: There is a prosthetic aortic valve present. There is no evidence of aortic valve regurgitation. The peak instantaneous gradient of the aortic valve is 15.7 mmHg. The mean gradient of the aortic valve is 6.8 mmHg. Well seated #29 Evolut TAVR with an acceptable gradient of 16mmHg, and no evidence for AI. Mitral Valve: The mitral valve is moderately thickened. There is evidence of  mild mitral valve stenosis. The doppler estimated mean and peak diastolic pressure gradients are 4.0 mmHg and 11.9 mmHg respectively. There is moderate mitral annular calcification. There is mild mitral valve regurgitation. Tricuspid Valve: The tricuspid valve is structurally normal. There is trace tricuspid regurgitation. Pulmonic Valve: The pulmonic valve is not well visualized. There is no indication of pulmonic valve regurgitation. Pericardium: There is no pericardial effusion noted. Aorta: The aortic root is normal.  CONCLUSIONS:  1. Left ventricular systolic function is normal with a 60-65% estimated ejection fraction.  2. Spectral Doppler shows an impaired relaxation pattern of left ventricular diastolic filling.  3. The left atrium is mild to moderately dilated.  4. The mitral valve is moderately thickened.  5. There is mild mitral valve stenosis.  6. There is moderate mitral annular calcification.  7. Well seated #29 Evolut TAVR with an acceptable gradient of 16mmHg, and no evidence for AI. QUANTITATIVE DATA SUMMARY: 2D MEASUREMENTS:                           Normal Ranges: LAs:           4.48 cm    (2.7-4.0cm) RVIDd:         2.22 cm    (0.9-3.6cm) IVSd:          1.31 cm    (0.6-1.1cm) LVPWd:         1.46 cm    (0.6-1.1cm) LVIDd:         3.80 cm    (3.9-5.9cm) LVIDs:         2.51 cm LV Mass Index: 111.9 g/m2 LV % FS        33.8 % LA VOLUME:                       Normal Ranges: LA Area A4C: 21.8 cm2 LA Area A2C: 27.8 cm2 LA Vol A4C:  72.5 ml LA Vol A2C:  101.4 ml RA VOLUME BY A/L METHOD:                       Normal Ranges: RA Area A4C: 11.6 cm2 AORTA MEASUREMENTS:                    Normal Ranges: Ao STJ, d: 3.10 cm (1.7-3.4cm) Asc Ao, d: 3.10 cm (2.1-3.4cm) LV SYSTOLIC FUNCTION BY 2D PLANIMETRY (MOD):                     Normal Ranges: EF-A4C View: 65.2 % (>=55%) EF-A2C View: 62.9 % EF-Biplane:  64.3 % LV DIASTOLIC FUNCTION:                        Normal Ranges: MV lateral e' 0.06 m/s MV medial e'  0.03  m/s MITRAL VALVE:                       Normal Ranges: MV Vmax:    1.73 m/s  (<=1.3m/s) MV peak P.9 mmHg (<5mmHg) MV mean P.0 mmHg  (<2mmHg) MV VTI:     45.70 cm  (10-13cm) AORTIC VALVE:                                    Normal Ranges: AoV Vmax:                1.98 m/s  (<=1.7m/s) AoV Peak PG:             15.7 mmHg (<20mmHg) AoV Mean P.8 mmHg  (1.7-11.5mmHg) LVOT Max Lele:            1.03 m/s  (<=1.1m/s) AoV VTI:                 37.68 cm  (18-25cm) LVOT VTI:                23.20 cm LVOT Diameter:           2.05 cm   (1.8-2.4cm) AoV Area, VTI:           2.03 cm2  (2.5-5.5cm2) AoV Area,Vmax:           1.71 cm2  (2.5-4.5cm2) AoV Dimensionless Index: 0.62  RIGHT VENTRICLE: RV Basal 2.00 cm RV Mid   2.70 cm RV Major 5.5 cm TAPSE:   16.0 mm RV s'    0.00 m/s TRICUSPID VALVE/RVSP:                             Normal Ranges: Peak TR Velocity: 2.54 m/s RV Syst Pressure: 28.8 mmHg (< 30mmHg) IVC Diam:         0.50 cm  [id number] Fabiola Mccurdy MD Electronically signed on 2024 at 7:08:52 AM  ** Final **     Transthoracic Echo (TTE) Limited    Result Date: 2024   [facility] [address] [place] [address]                Tel [phone] and Fax [phone] TRANSTHORACIC ECHOCARDIOGRAM REPORT  Patient Name:      TIEN Betancur Physician:    [id number] Arsalan SIMMONS MD Study Date:        2024            Ordering Provider:    [id number] ELLIS CATES MRN/PID:           [id number]             Fellow: Accession#:        [id number]         Nurse: Date of Birth/Age: 1940 / 83 years Sonographer:          Lavonne Burks RDCS Gender:            F                    Additional Staff: Height:            152.40 cm            Admit Date:           2024 Weight:            70.76 kg             Admission Status:     Inpatient -                                                                Routine BSA:               1.68 m2              Encounter#:           [id number]                                         Department Location:  [facility] Blood Pressure: 125 /96 mmHg Study Type:    TRANSTHORACIC ECHO (TTE) LIMITED Diagnosis/ICD: Presence of prosthetic heart valve-Z95.2 Indication:    Presence of prosthetic heart valve CPT Code:      Echo Limited-71121; Doppler Limited-83586; Color Doppler-90164 Patient History: Pertinent History: S/p 29 mm Medtronic Evolut (01/30/2024), HTN, HLD, Mitral                    Stenosis, SOB. Study Detail: The following Echo studies were performed: 2D, M-Mode, Doppler and               color flow. Technically challenging study due to poor acoustic               windows, prominent lung artifact, patient lying in supine position               and body habitus. Definity used as a contrast agent for               endocardial border definition. Total contrast used for this               procedure was 1 mL via IV push.  PHYSICIAN INTERPRETATION: Left Ventricle: The left ventricular systolic function is normal, with an estimated ejection fraction of 65%. There are no regional wall motion abnormalities. The left ventricular cavity size is upper limits of normal. Abnormal (paradoxical) septal motion, consistent with RV pacemaker. Left ventricular diastolic filling was indeterminate. Left Atrium: The left atrium is mildly dilated. Right Ventricle: The right ventricle is normal in size. There is normal right ventricular global systolic function. A device is visualized in the right ventricle. Right Atrium: The right atrium is normal in size. There is a device visualized in the right atrium. Aortic Valve: There is a prosthetic aortic valve present. There is transcatheter aortic valve replacement. There is trivial aortic valve regurgitation. The peak instantaneous gradient of the aortic valve is 19.5 mmHg. The mean gradient of the aortic valve is 9.0  mmHg. Mitral Valve: The mitral valve is abnormal. There is moderate thickening and calcification of the anterior and posterior mitral valve leaflets. There is evidence of moderate mitral valve stenosis. The doppler estimated mean and peak diastolic pressure gradients are 6.0 mmHg and 13.8 mmHg respectively. There is moderate mitral annular calcification. There is mild mitral valve regurgitation. The mean gradient of the mitral valve is 6.0 mmHg. Tricuspid Valve: The tricuspid valve is structurally normal. There is trace to mild tricuspid regurgitation. Pulmonic Valve: The pulmonic valve is not well visualized. There is trace pulmonic valve regurgitation. Pericardium: There is a trivial pericardial effusion. Aorta: The aortic root was not well visualized. There is no dilatation of the aortic root. Pulmonary Artery: The tricuspid regurgitant velocity is 2.76 m/s, and with an estimated right atrial pressure of 3 mmHg, the estimated pulmonary artery pressure is borderline elevated with the RVSP at 33.5 mmHg. Systemic Veins: The inferior vena cava appears to be of normal size.  CONCLUSIONS:  1. Left ventricular systolic function is normal with a 65% estimated ejection fraction.  2. Abnormal septal motion consistent with RV pacemaker.  3. There is moderate mitral annular calcification.  4. There is moderate thickening and calcification of the anterior and posterior mitral valve leaflets.  5. There is moderate mitral valve stenosis.  6. There is a transcatheter aortic valve replacement. QUANTITATIVE DATA SUMMARY: 2D MEASUREMENTS:                           Normal Ranges: Ao Root d:     2.00 cm    (2.0-3.7cm) LAs:           4.20 cm    (2.7-4.0cm) IVSd:          0.80 cm    (0.6-1.1cm) LVPWd:         0.80 cm    (0.6-1.1cm) LVIDd:         5.20 cm    (3.9-5.9cm) LVIDs:         3.85 cm LV Mass Index: 107.2 g/m2 LV % FS        26.0 % AORTA MEASUREMENTS:                      Normal Ranges: Ao Sinus, d: 3.20 cm (2.1-3.5cm) LV  SYSTOLIC FUNCTION BY 2D PLANIMETRY (MOD):                     Normal Ranges: EF-A4C View: 70.0 % (>=55%) EF-A2C View: 57.7 % EF-Biplane:  64.6 % LV DIASTOLIC FUNCTION:                        Normal Ranges: MV Peak E:    1.85 m/s (0.7-1.2 m/s) MV Peak A:    1.58 m/s (0.42-0.7 m/s) E/A Ratio:    1.17     (1.0-2.2) MV e'         0.06 m/s (>8.0) MV lateral e' 0.08 m/s MV medial e'  0.05 m/s E/e' Ratio:   28.46    (<8.0) a'            0.06 m/s MV DT:        302 msec (150-240 msec) MITRAL VALVE:                       Normal Ranges: MV Vmax:    1.86 m/s  (<=1.3m/s) MV peak P.8 mmHg (<5mmHg) MV mean P.0 mmHg  (<2mmHg) MV DT:      302 msec  (150-240msec) AORTIC VALVE:                                    Normal Ranges: AoV Vmax:                2.21 m/s  (<=1.7m/s) AoV Peak P.5 mmHg (<20mmHg) AoV Mean P.0 mmHg  (1.7-11.5mmHg) LVOT Max Lele:            1.56 m/s  (<=1.1m/s) AoV VTI:                 38.10 cm  (18-25cm) LVOT VTI:                28.20 cm LVOT Diameter:           1.90 cm   (1.8-2.4cm) AoV Area, VTI:           2.10 cm2  (2.5-5.5cm2) AoV Area,Vmax:           2.00 cm2  (2.5-4.5cm2) AoV Dimensionless Index: 0.74  RIGHT VENTRICLE: TAPSE: 25.1 mm RV s'  0.14 m/s TRICUSPID VALVE/RVSP:                             Normal Ranges: Peak TR Velocity: 2.76 m/s RV Syst Pressure: 33.5 mmHg (< 30mmHg) IVC Diam:         1.50 cm  [id number] Arsalan Flor MD Electronically signed on 2024 at 12:41:51 PM  ** Final **     Transthoracic Echo (TTE) Limited    Result Date: 2024   [facility], [address] [place] [address]                Tel [phone] and Fax [phone] TRANSTHORACIC ECHOCARDIOGRAM REPORT  Patient Name:      TIEN           Pipe Physician:    [id number] Maribel SIMMONS MD Study Date:        2024            Ordering Provider:    [id number] PEGGY ALBRECHT                                                                PHUONGARIELLE MRN/PID:           [id number]             Fellow: Accession#:        [id number]         Nurse: Date of Birth/Age: 1940 / 83 years Sonographer:          Onofre Cardenas RDCS Gender:            F                    Additional Staff: Height:            154.94 cm            Admit Date:           1/30/2024 Weight:            70.76 kg             Admission Status:     Inpatient -                                                               Routine BSA:               1.70 m2              Encounter#:           [id number]                                         Department Location:  [facility]                                                               Cath Lab Blood Pressure: 123 /61 mmHg Study Type:    TRANSTHORACIC ECHO (TTE) LIMITED Diagnosis/ICD: Nonrheumatic aortic (valve) stenosis-I35.0 Indication:    TAVR periprocedure CPT Code:      Echo Limited-12797; Color Doppler-11333; Doppler Limited-92072 Patient History: Valve Disorders:   Aortic Stenosis and Mitral Stenosis. Pertinent History: HTN; HLD; hx. of PFO. Study Detail: The following Echo studies were performed: 2D, M-Mode, Doppler and               color flow. Technically challenging study due to body habitus.  PHYSICIAN INTERPRETATION: Left Ventricle: The left ventricular systolic function is normal, with an estimated ejection fraction of 65-70%. There are no regional wall motion abnormalities. The left ventricular cavity size is normal. Left ventricular diastolic filling was not assessed. Left Atrium: The left atrium is moderately dilated. Right Ventricle: The right ventricle was not well visualized. Unable to determine right ventricular systolic function. Right Atrium: The right atrium was not well visualized. Aortic Valve: The aortic valve appears abnormal. There is mild to moderate aortic valve regurgitation. The peak instantaneous gradient of the aortic valve  is 70.2 mmHg. The mean gradient of the aortic valve is 44.0 mmHg. Baseline: severe clacific AS with gradients of 70.2/44mmHg and DI of 0.25 and mild to moderate AI. Proceeded to TAVR. Mitral Valve: The mitral valve is moderately thickened. There is moderate thickening and calcification of the anterior mitral valve leaflet. There is severe mitral annular calcification. There is trace mitral valve regurgitation. There is severe MAC and at least trivial MR, though could be underestimated due to sheilding from the MAC. The mean MV gradient is 6.2mmHg. Tricuspid Valve: The tricuspid valve was not well visualized. There is trace tricuspid regurgitation. The right ventricular systolic pressure is unable to be estimated. Pulmonic Valve: The pulmonic valve is not well visualized. Pulmonic valve regurgitation was not assessed. Pericardium: There is a trivial pericardial effusion. Aorta: The aortic root is abnormal. There is mild dilatation of the ascending aorta. The aortic root is at the upper limits of normal size. Systemic Veins: The inferior vena cava size appears small. In comparison to the previous echocardiogram(s): Compared with the prior exam from 10/16/2023 the prior AV gradients were higher at 94.5/55mmHg with mild to oderqte AI. The LV systolic function was similr at that time.  Post Transcatheter Aortic Valve Placement (TAVR): The peak instantaneous gradient of the aortic valve is 9.7 mmHg. The mean gradient of the aortic valve is 4.0 mmHg. There is a Medtronic transcatheter aortic valve replacement, with a 29 mm reported size. There is mild mitral valve regurgitation. There is a trivial pericardial effusion. Post TAVR there is no change in theinitial trivial pericardial effusion and the MR has increased from trivial to mild. The LV systolic function remains normal.  CONCLUSIONS:  1. Left ventricular systolic function is normal with a 65-70% estimated ejection fraction.  2. The left atrium is moderately dilated.   3. There is severe mitral annular calcification.  4. of 70.2/44mmHg and DI of 0.25 and mild to moderate AI.     Proceeded to TAVR.  5. S/p 29mm Medtronic Evolut TAVR with gradients of 9.7/4mmHgh with trivial AI.  6. There is severe MAC and at least trivial MR, though could be underestimated due to sheilding from the MAC. The mean MV gradient is 6.2mmHg.  7. There is moderate thickening and calcification of the anterior mitral valve leaflet.  8. The mitral valve is moderately thickened.  9. Compared with the prior exam from 10/16/2023 the prior AV gradients were higher at 94.5/55mmHg with mild to oderqte AI. The LV systolic function was similr at that time. QUANTITATIVE DATA SUMMARY: 2D MEASUREMENTS:                           Normal Ranges: Ao Root d:     3.20 cm    (2.0-3.7cm) LAs:           4.14 cm    (2.7-4.0cm) IVSd:          1.12 cm    (0.6-1.1cm) LVPWd:         0.98 cm    (0.6-1.1cm) LVIDd:         5.15 cm    (3.9-5.9cm) LVIDs:         3.05 cm LV Mass Index: 120.0 g/m2 LV % FS        40.8 % LA VOLUME:                               Normal Ranges: LA Vol A4C:        68.7 ml    (22+/-6mL/m2) LA Vol A2C:        78.0 ml LA Vol BP:         73.4 ml LA Vol Index A4C:  40.4ml/m2 LA Vol Index A2C:  45.9 ml/m2 LA Vol Index BP:   43.2 ml/m2 LA Area A4C:       22.3 cm2 LA Area A2C:       23.7 cm2 LA Major Axis A4C: 6.2 cm LA Major Axis A2C: 6.1 cm LA Volume Index:   43.2 ml/m2 AORTA MEASUREMENTS:                      Normal Ranges: Ao Sinus, d: 3.60 cm (2.1-3.5cm) Asc Ao, d:   3.90 cm (2.1-3.4cm) LV SYSTOLIC FUNCTION BY 2D PLANIMETRY (MOD):                     Normal Ranges: EF-A4C View: 62.2 % (>=55%) EF-A2C View: 73.7 % EF-Biplane:  68.2 % MITRAL VALVE:                       Normal Ranges: MV Vmax:    1.79 m/s  (<=1.3m/s) MV peak P.8 mmHg (<5mmHg) MV mean P.0 mmHg  (<2mmHg) AORTIC VALVE:                                              Normal Ranges: AoV Vmax:                          4.19 m/s  (<=1.7m/s) AoV Vmax  Post TAVR:                1.56 m/s  (<=1.7m/s) AoV Peak P.2 mmHg (<20mmHg) AoV Peak PG Post TAVR:             9.7 mmHg  (<20mmHg) AoV Mean P.0 mmHg (1.7-11.5mmHg) AoV Mean PG Post TAVR:             4.0 mmHg  (1.7-11.5mmHg) LVOT Max Lele:                      0.98 m/s  (<=1.1m/s) LVOT Max Lele Post TAVR:            0.97 m/s  (<=1.1m/s) AoV VTI:                           104.00 cm (18-25cm) AoV VTI Post TAVR:                 32.80 cm  (18-25cm) LVOT VTI:                          26.10 cm LVOT VTI Post TAVR:                22.00 cm LVOT Diameter:                     2.10 cm   (1.8-2.4cm) LVOT Diameter Post TAVR:           2.10 cm   (1.8-2.4cm) AoV Area, VTI:                     0.87 cm2  (2.5-5.5cm2) AoV Area, VTI Post TAVR:           2.32 cm2  (2.5-5.5cm2) AoV Area,Vmax:                     0.81 cm2  (2.5-4.5cm2) AoV Area,Vmax Post TAVR:           2.15 cm2  (2.5-4.5cm2) AoV Dimensionless Index:           0.25 AoV Dimensionless Index Post TAVR: 0.67 AORTIC INSUFFICIENCY: AI Vmax:       4.04 m/s AI Half-time:  559 msec AI Decel Rate: 212.00 cm/s2  [id number] Maribel Nova MD Electronically signed on 2024 at 3:41:19 PM  ** Final **     Transthoracic Echo (TTE) Complete    Result Date: 10/16/2023    [facility], [address] [place] [id number] [phone] and                                 Fax [phone] TRANSTHORACIC ECHOCARDIOGRAM REPORT  Patient Name:      TIEN Betancur Physician:    Susan HERNANDEZDELKOSKI Study Date:        10/16/2023           Ordering Provider:    Susan PERALES MRN/PID:           [id number]             Fellow: Accession#:        [id number]         Nurse: Date of Birth/Age: 1940 / 82 years Sonographer:          Natalia Drew RDCS Gender:            F                    Additional Staff: Height:            152.40 cm             Admit Date: Weight:            70.31 kg             Admission Status: BSA:               1.67 m2              Encounter#:           [id number]                                         [facility]  [facility] Blood Pressure: 132 /84 mmHg Study Type:    TRANSTHORACIC ECHO (TTE) COMPLETE Diagnosis/ICD: Nonrheumatic aortic (valve) stenosis-I35.0 Indication:    Aortic stenosis CPT Code:      Echo Complete w Full Doppler-54364 Patient History: Valve Disorders:   Aortic Stenosis and Mitral Stenosis. Pertinent History: H/o PFO, HTN and Hyperlipidemia. Study Detail: The following Echo studies were performed: 2D, M-Mode, Doppler and               color flow.  PHYSICIAN INTERPRETATION: Left Ventricle: The left ventricular systolic function is normal, with an estimated ejection fraction of 60-65%. There are no regional wall motion abnormalities. The left ventricular cavity size is normal. There is mild concentric left ventricular hypertrophy. Spectral Doppler shows an impaired relaxation pattern of left ventricular diastolic filling. Left Atrium: The left atrium is mildly dilated. Right Ventricle: The right ventricle is normal in size. There is normal right ventricular global systolic function. Right Atrium: The right atrium is normal in size. Aortic Valve: The aortic valve was not well visualized. There is mild to moderate aortic valve regurgitation. The peak instantaneous gradient of the aortic valve is 94.5 mmHg. The mean gradient of the aortic valve is 55.0 mmHg. Severe AS A V-max 4.9 m/s Peak/mean gradient 95/55 mmHg NEHEMIAH 0.8 cmï¿½. Mitral Valve: The mitral valve is moderately thickened. There is evidence of mild mitral valve stenosis. The doppler estimated mean and peak diastolic pressure gradients are 2.6 mmHg and 7.9 mmHg respectively. There is moderate mitral annular calcification. There is mild to moderate mitral valve regurgitation. Tricuspid Valve: The tricuspid valve is structurally normal. There is  trace tricuspid regurgitation. The Doppler estimated RVSP is mildly elevated. Pulmonic Valve: The pulmonic valve is structurally normal. There is no indication of pulmonic valve regurgitation. Pericardium: There is no pericardial effusion noted. Aorta: The aortic root is normal.  CONCLUSIONS:  1. Left ventricular systolic function is normal with a 60-65% estimated ejection fraction.  2. Spectral Doppler shows an impaired relaxation pattern of left ventricular diastolic filling.  3. The mitral valve is moderately thickened.  4. There is mild mitral valve stenosis.  5. There is moderate mitral annular calcification.  6. Mild to moderate mitral valve regurgitation.  7. Mildly elevated RVSP.  8. Severe AS     A V-max 4.9 m/s     Peak/mean gradient 95/55 mmHg     NEHEMIAH 0.8 cmï¿½.  9. Mild to moderate aortic valve regurgitation. QUANTITATIVE DATA SUMMARY: 2D MEASUREMENTS:                           Normal Ranges: LAs:           4.20 cm    (2.7-4.0cm) RVIDd:         2.03 cm    (0.9-3.6cm) IVSd:          1.43 cm    (0.6-1.1cm) LVPWd:         1.42 cm    (0.6-1.1cm) LVIDd:         3.95 cm    (3.9-5.9cm) LVIDs:         2.79 cm LV Mass Index: 126.1 g/m2 LV % FS        29.5 % LA VOLUME:                       Normal Ranges: LA Area A4C: 22.9 cm2 LA Area A2C: 27.7 cm2 LA Vol A4C:  68.4 ml LA Vol A2C:  99.3 ml RA VOLUME BY A/L METHOD:                       Normal Ranges: RA Area A4C: 14.6 cm2 M-MODE MEASUREMENTS:                  Normal Ranges: AoV Exc: 1.15 cm (1.5-2.5cm) AORTA MEASUREMENTS:                    Normal Ranges: AoV Exc:   1.15 cm (1.5-2.5cm) Ao STJ, d: 3.20 cm (1.7-3.4cm) Asc Ao, d: 3.80 cm (2.1-3.4cm) LV SYSTOLIC FUNCTION BY 2D PLANIMETRY (MOD):                     Normal Ranges: EF-A4C View: 67.7 % (>=55%) EF-A2C View: 54.5 % EF-Biplane:  59.7 % LV DIASTOLIC FUNCTION:                        Normal Ranges: MV lateral e' 0.05 m/s MV medial e'  0.03 m/s MITRAL VALVE:                      Normal Ranges: MV Vmax:    1.41  m/s (<=1.3m/s) MV peak P.9 mmHg (<5mmHg) MV mean P.6 mmHg (<2mmHg) MV VTI:     45.53 cm (10-13cm) MITRAL INSUFFICIENCY:                         Normal Ranges: MR Flow Rt: 153.17 ml/s AORTIC VALVE:                                    Normal Ranges: AoV Vmax:                4.86 m/s  (<=1.7m/s) AoV Peak P.5 mmHg (<20mmHg) AoV Mean P.0 mmHg (1.7-11.5mmHg) LVOT Max Lele:            1.23 m/s  (<=1.1m/s) AoV VTI:                 114.73 cm (18-25cm) LVOT VTI:                30.17 cm LVOT Diameter:           1.98 cm   (1.8-2.4cm) AoV Area, VTI:           0.81 cm2  (2.5-5.5cm2) AoV Area,Vmax:           0.78 cm2  (2.5-4.5cm2) AoV Dimensionless Index: 0.26 AORTIC INSUFFICIENCY: AI Vmax:       4.50 m/s AI Half-time:  525 msec AI Decel Time: 1810 msec AI Decel Rate: 252.67 cm/s2  RIGHT VENTRICLE: RV Basal 2.70 cm RV Mid   1.50 cm RV Major 5.2 cm TAPSE:   16.0 mm RV s'    0.00 m/s AORTA: Asc Ao Diam 3.83 cm  [id number] Fabiola Mccurdy MD Electronically signed on 10/16/2023 at 12:20:58 PM  ** Final **                  Heart Failure Follow up    NYHA class 1    Edema Denies  Dyspnea on Exertion Denies  Fatigue Improved  Exercise Intolerance Improved  Orthopnea Denies  PND Denies    Chest pain Yes  Syncope No  Palpitations No    All organ systems normal except: fleeting chest tightness                 [facility]Q Questionnaire    1  Heart failure affects different people in different ways. Some feel shortness of breath while others feel fatigue. Please indicate how much you are limited by heart failure (shortness of breath or fatigue) in your ability to do the following activities over the past 2 weeks.     A.) Showering/bathing  5. Not at All  B.) Walking 1 block on level ground 5. Not at All  C.) Hurrying or Jogging   6. Limited for other reastons    2.  Over the past 2 weeks, how many times did you have swelling in your feet, ankles or legs when you woke up in the morning? 5. Never    3.  Over the past 2  weeks, on average, how many times has fatigue limited your ability to do what you wanted? 6. Less than once a week    4.  Over the past 2 weeks, on average, how many times has shortness of breath limited your ability to do what you wanted? 7. Never    5.  Over the past 2 weeks, on average, how many times have you been forced to sleep sitting up in a chair or with at least 3 pillows to prop you up because of shortness of breath? Never    6. Over the past 2 weeks, how much has your heart failure limited your enjoyment of life? It has slightly limited my enjoyment of life    7. If you had to spend the rest of your life with your heart failure the way it is right now, how would you feel about this? 4. Mostly satisfied    8. How much does your heart failure affect your lifestyle? Please indicate how your heart failure may have limited yourparticipation in the following activities over the past 2 weeks    A.)  Hobbies, recreational activities  5. Did not limit at all    B.) Working or doing household chores  5. Did not limit at all    C.) Visiting family or friends out of your home  5. Did not limit at all      Ayo Bueno is a 83 y.o. year old female. S/p TAVR - Evolut FX 29mm Predil with 18mm TruDil Balloon via right Femoral Artery on 1/30/2024 with Dr. Carrasco.    Impression  - 1 month s/p TAVR  - Pt appears to be euvolemic with flat neck veins and no BLE edema.  Work of breathing normal with NAD, skin tone without pallor.   - states she is feeling well overall  - HF symptoms:  improving fatigue, otherwise denies HF symptoms  - Chest tightness/pain:  very brief episodes that feel like tightness across the chest (occasionally radiate) have occurred at rest and with exertion, but not consistent with exertion.  Episodes have become less frequent as she gets further away from her TAVR.  No other associated symptoms like dizziness/HA/N/V/diaphoresis that would be consistent with ACS.  [facility] from 1/2024 shows  minimal CAD.  Did not occur around meal time.  Could be rhythm mediated or possibly muscle/skeletal pain.  Instructed to go to ER if worsens or develops symptoms of ACS.  - vitals:  HR 60-70s, -140, wt up 3lbs to 158.8  - groins:  healing well  - Preventice monitor:  reviewed 1 episode of transient CHB with 2 drop QRS complexes, no other episodes of advanced heart block observed  - Overall visually looks well, clinically doing very well.  Encouraged to increase her activity level    1 month ECHO - EF 60-65%, no AI, grad 15.7/6.8, mild MS (mean grad 4), mild MR, trace TR, no PC effusion      Plan:   Contacted Dr. Ramicone and Dr. Mccurdy regarding the CHB (pt will see Dr. Ramicone for evaluation)  Cont ASA for life  Cont current medication regimen  Cont to increase activity  f/u with Structural NP in 1 year - ECHO can be closer to home  f/u with Dr. Mccurdy ([facility]) as scheduled    Virtual visit    Isiah Mayo MSN, [facility]  Acute Care Nurse Practitioner  Structural Heart / TAVR Team  [phone] (ph)  [phone] (fax)

## 2024-03-08 PROBLEM — M77.9 ENTHESOPATHY: Status: ACTIVE | Noted: 2023-02-23

## 2024-03-12 PROBLEM — I35.0 AORTIC STENOSIS: Chronic | Status: RESOLVED | Noted: 2023-11-15 | Resolved: 2024-03-12

## 2024-03-13 ENCOUNTER — ANCILLARY PROCEDURE (OUTPATIENT)
Dept: CARDIOLOGY | Facility: CLINIC | Age: 84
End: 2024-03-13
Payer: MEDICARE

## 2024-03-13 ENCOUNTER — OFFICE VISIT (OUTPATIENT)
Dept: CARDIOLOGY | Facility: CLINIC | Age: 84
End: 2024-03-13
Payer: MEDICARE

## 2024-03-13 VITALS
DIASTOLIC BLOOD PRESSURE: 80 MMHG | HEART RATE: 71 BPM | HEIGHT: 60 IN | OXYGEN SATURATION: 98 % | WEIGHT: 161 LBS | BODY MASS INDEX: 31.61 KG/M2 | SYSTOLIC BLOOD PRESSURE: 128 MMHG

## 2024-03-13 DIAGNOSIS — I44.2: Primary | ICD-10-CM

## 2024-03-13 DIAGNOSIS — I44.2: ICD-10-CM

## 2024-03-13 PROBLEM — I44.1 AV BLOCK, MOBITZ II: Status: ACTIVE | Noted: 2024-03-13

## 2024-03-13 LAB — BODY SURFACE AREA: 1.76 M2

## 2024-03-13 PROCEDURE — 3074F SYST BP LT 130 MM HG: CPT | Performed by: INTERNAL MEDICINE

## 2024-03-13 PROCEDURE — 1036F TOBACCO NON-USER: CPT | Performed by: INTERNAL MEDICINE

## 2024-03-13 PROCEDURE — 3079F DIAST BP 80-89 MM HG: CPT | Performed by: INTERNAL MEDICINE

## 2024-03-13 PROCEDURE — 1159F MED LIST DOCD IN RCRD: CPT | Performed by: INTERNAL MEDICINE

## 2024-03-13 PROCEDURE — 99215 OFFICE O/P EST HI 40 MIN: CPT | Performed by: INTERNAL MEDICINE

## 2024-03-13 NOTE — ASSESSMENT & PLAN NOTE
1.  Aortic valve stenosis: This patient has a history of critical aortic valve stenosis and had a successful TAVR procedure on January 30, 2024.    2.  Transient complete heart block: The cardiac event monitor showed a 3-second pause on February 2, 2024 but over the remainder of the monitor through February 29, 2024 she was in sinus rhythm with no episodes of AV block.  Her symptom description at this time is suspicious for bradycardia and therefore we will proceed with an extended Holter monitor for further evaluation.  If she has any evidence of high-grade AV block then a pacemaker will be recommended.  The potential need for a pacemaker was discussed with the patient and her daughter and they are in agreement with proceeding with the Holter monitor.  She will be following up with Dr. Mccurdy next week as scheduled.

## 2024-03-13 NOTE — LETTER
March 13, 2024     Timothy Bardales DO  [address]  [address]  [facility] [id number]    Patient: Ayo Bueno   YOB: 1940   Date of Visit: 3/13/2024       Dear Dr. Timothy Bardales DO:    Thank you for referring Ayo Bueno to me for evaluation. Below are my notes for this consultation.  If you have questions, please do not hesitate to call me. I look forward to following your patient along with you.       Sincerely,     James C Ramicone, DO      CC: Matt Nix, TRINI-CNP  ______________________________________________________________________________________    Reason For Consult    Bradycardia    History Of Present Illness    This is an 83-year-old female with a history of aortic valve stenosis, status post TAVR.  The patient is being seen today in consultation at the request of Matt Nix PA-C.  The patient underwent a TAVR on January 30, 2024 for treatment of severe aortic valve stenosis.  At the time of the TAVR she did have a temporary transvenous pacing wire in place but did not require a permanent pacemaker insertion.  She then used an event monitor following hospital discharge.  Recently she has been describing a feeling of being pushed from behind, but denies feeling dizzy or suffering any falls/syncopal episodes.  Her daughter was present during the evaluation to translate.  Outpatient cardiology records from Dr. Mccurdy reviewed today as well as the records from [facility] at the time of the TAVR procedure.    Past medical history:    Critical aortic valve stenosis  Hypertension  Hyperlipidemia  Mild mitral valve stenosis    Social history: Non-smoker     Review of systems  Other review of systems negative other than as outlined in HPI     Social History  She reports that she has never smoked. She has never been exposed to tobacco smoke. She has never used smokeless tobacco. She reports that she does not drink alcohol and does not use  drugs.    Family History  Family History   Problem Relation Name Age of Onset   • Tuberculosis Mother     • Stroke Sister     • Coronary artery disease Sister     • Diabetes Brother          Allergies  Prednisone, Aspirin, and Sulfa (sulfonamide antibiotics)    Medications  Current Outpatient Medications   Medication Instructions   • acetaminophen (Tylenol) 325 mg tablet oral, Every 6 hours, 2 tab(s) orally every 6 hours-continue routinely around the clock for next 72 hours then decrease to as needed for mild pain or temperature   • amoxicillin (Amoxil) 500 mg capsule Take 4 caps (2000 mg) 30-60mins prior to your dental appointment   • aspirin 81 mg, oral, Daily   • busPIRone (BUSPAR) 10 mg, oral, 2 times daily   • cholecalciferol (VITAMIN D-3) 25 mcg, oral, Daily   • fluticasone (Flonase) 50 mcg/actuation nasal spray 1 spray, Each Nostril, Daily   • hydroCHLOROthiazide (HYDRODIURIL) 25 mg, oral, Daily   • ipratropium (Atrovent) 42 mcg (0.06 %) nasal spray 2 sprays, Each Nostril, 3 times daily PRN   • levothyroxine (Synthroid, Levoxyl) 50 mcg tablet TAKE ONE TABLET DAILY MONDAY THROUGH FRIDAY AND 2 TABLETS ON SATURDAY AND SUNDAY   • lovastatin (Mevacor) 40 mg tablet TAKE 1 TABLET EVERY DAY AT DINNER   • omeprazole (PRILOSEC) 20 mg, oral, Daily   • polyethylene glycol (GLYCOLAX, MIRALAX) 17 g, oral, 2 times daily, 17 gram(s) orally 2 times a day as needed for constipation   • sertraline (ZOLOFT) 100 mg, oral, Daily         Vitals      1/31/2024     8:00 AM 1/31/2024     9:00 AM 1/31/2024    10:00 AM 1/31/2024    11:00 AM 1/31/2024    11:21 AM 1/31/2024    12:00 PM 3/13/2024    11:00 AM   Vitals   Systolic 125 126 125 125 125 130 128   Diastolic 52 61 96 57 57 56 80   Heart Rate 78 77 82 74 73 73 71   Temp 36.5 °C (97.7 °F)     37.4 °C (99.3 °F)    Resp 15 18 19 20  22    Height (in)       1.524 m (5')   Weight (lb)       161   BMI       31.44 kg/m2   BSA (m2)       1.76 m2   Visit Report       Report        Physical  Exam    General Appearance:  Alert, oriented, no distress  Skin:  Warm and dry  Head and Neck:  No elevation of JVP, no carotid bruits  Cardiac Exam:  Rhythm is regular, S1 and S2 are normal, no murmur S3 or S4  Lungs:  Clear to auscultation  Extremities:  no edema  Neurologic:  No focal deficits  Psychiatric:  Appropriate mood and behavior       Test Results    I have reviewed the following diagnostic studies and my interpretation is as follows:    EKG January 31, 2024: Sinus rhythm, right bundle branch block  Cardiac event monitor January 31 through February 29, 2024: Sinus rhythm with 1 episode of Mobitz 2 AV block on February 2, 2024.  No episodes of AV block were present for the remainder of the monitor  Echocardiogram January 31, 2024: Ejection fraction 65%     Assessment/Plan  Problem List Items Addressed This Visit             ICD-10-CM    Transient complete heart block (CMS/HCC) - Primary I44.2     1.  Aortic valve stenosis: This patient has a history of critical aortic valve stenosis and had a successful TAVR procedure on January 30, 2024.    2.  Transient complete heart block: The cardiac event monitor showed a 3-second pause on February 2, 2024 but over the remainder of the monitor through February 29, 2024 she was in sinus rhythm with no episodes of AV block.  Her symptom description at this time is suspicious for bradycardia and therefore we will proceed with an extended Holter monitor for further evaluation.  If she has any evidence of high-grade AV block then a pacemaker will be recommended.  The potential need for a pacemaker was discussed with the patient and her daughter and they are in agreement with proceeding with the Holter monitor.  She will be following up with Dr. Mccurdy next week as scheduled.         Relevant Orders    Holter or Event Cardiac Monitor             James C Ramicone, DO

## 2024-03-13 NOTE — PROGRESS NOTES
Reason For Consult    Bradycardia    History Of Present Illness    This is an 83-year-old female with a history of aortic valve stenosis, status post TAVR.  The patient is being seen today in consultation at the request of Matt Nix PA-C.  The patient underwent a TAVR on January 30, 2024 for treatment of severe aortic valve stenosis.  At the time of the TAVR she did have a temporary transvenous pacing wire in place but did not require a permanent pacemaker insertion.  She then used an event monitor following hospital discharge.  Recently she has been describing a feeling of being pushed from behind, but denies feeling dizzy or suffering any falls/syncopal episodes.  Her daughter was present during the evaluation to translate.  Outpatient cardiology records from Dr. Mccurdy reviewed today as well as the records from [facility] at the time of the TAVR procedure.    Past medical history:    Critical aortic valve stenosis  Hypertension  Hyperlipidemia  Mild mitral valve stenosis    Social history: Non-smoker     Review of systems  Other review of systems negative other than as outlined in HPI     Social History  She reports that she has never smoked. She has never been exposed to tobacco smoke. She has never used smokeless tobacco. She reports that she does not drink alcohol and does not use drugs.    Family History  Family History   Problem Relation Name Age of Onset    Tuberculosis Mother      Stroke Sister      Coronary artery disease Sister      Diabetes Brother          Allergies  Prednisone, Aspirin, and Sulfa (sulfonamide antibiotics)    Medications  Current Outpatient Medications   Medication Instructions    acetaminophen (Tylenol) 325 mg tablet oral, Every 6 hours, 2 tab(s) orally every 6 hours-continue routinely around the clock for next 72 hours then decrease to as needed for mild pain or temperature    amoxicillin (Amoxil) 500 mg capsule Take 4 caps (2000 mg) 30-60mins prior to your dental appointment     aspirin 81 mg, oral, Daily    busPIRone (BUSPAR) 10 mg, oral, 2 times daily    cholecalciferol (VITAMIN D-3) 25 mcg, oral, Daily    fluticasone (Flonase) 50 mcg/actuation nasal spray 1 spray, Each Nostril, Daily    hydroCHLOROthiazide (HYDRODIURIL) 25 mg, oral, Daily    ipratropium (Atrovent) 42 mcg (0.06 %) nasal spray 2 sprays, Each Nostril, 3 times daily PRN    levothyroxine (Synthroid, Levoxyl) 50 mcg tablet TAKE ONE TABLET DAILY MONDAY THROUGH FRIDAY AND 2 TABLETS ON SATURDAY AND SUNDAY    lovastatin (Mevacor) 40 mg tablet TAKE 1 TABLET EVERY DAY AT DINNER    omeprazole (PRILOSEC) 20 mg, oral, Daily    polyethylene glycol (GLYCOLAX, MIRALAX) 17 g, oral, 2 times daily, 17 gram(s) orally 2 times a day as needed for constipation    sertraline (ZOLOFT) 100 mg, oral, Daily         Vitals      1/31/2024     8:00 AM 1/31/2024     9:00 AM 1/31/2024    10:00 AM 1/31/2024    11:00 AM 1/31/2024    11:21 AM 1/31/2024    12:00 PM 3/13/2024    11:00 AM   Vitals   Systolic 125 126 125 125 125 130 128   Diastolic 52 61 96 57 57 56 80   Heart Rate 78 77 82 74 73 73 71   Temp 36.5 °C (97.7 °F)     37.4 °C (99.3 °F)    Resp 15 18 19 20  22    Height (in)       1.524 m (5')   Weight (lb)       161   BMI       31.44 kg/m2   BSA (m2)       1.76 m2   Visit Report       Report        Physical Exam    General Appearance:  Alert, oriented, no distress  Skin:  Warm and dry  Head and Neck:  No elevation of JVP, no carotid bruits  Cardiac Exam:  Rhythm is regular, S1 and S2 are normal, no murmur S3 or S4  Lungs:  Clear to auscultation  Extremities:  no edema  Neurologic:  No focal deficits  Psychiatric:  Appropriate mood and behavior       Test Results    I have reviewed the following diagnostic studies and my interpretation is as follows:    EKG January 31, 2024: Sinus rhythm, right bundle branch block  Cardiac event monitor January 31 through February 29, 2024: Sinus rhythm with 1 episode of Mobitz 2 AV block on February 2, 2024.  No  episodes of AV block were present for the remainder of the monitor  Echocardiogram January 31, 2024: Ejection fraction 65%     Assessment/Plan  Problem List Items Addressed This Visit             ICD-10-CM    Transient complete heart block (CMS/HCC) - Primary I44.2     1.  Aortic valve stenosis: This patient has a history of critical aortic valve stenosis and had a successful TAVR procedure on January 30, 2024.    2.  Transient complete heart block: The cardiac event monitor showed a 3-second pause on February 2, 2024 but over the remainder of the monitor through February 29, 2024 she was in sinus rhythm with no episodes of AV block.  Her symptom description at this time is suspicious for bradycardia and therefore we will proceed with an extended Holter monitor for further evaluation.  If she has any evidence of high-grade AV block then a pacemaker will be recommended.  The potential need for a pacemaker was discussed with the patient and her daughter and they are in agreement with proceeding with the Holter monitor.  She will be following up with Dr. Mccurdy next week as scheduled.         Relevant Orders    Holter or Event Cardiac Monitor             James C Ramicone, DO

## 2024-03-14 LAB — BODY SURFACE AREA: 1.75 M2

## 2024-03-15 ENCOUNTER — PATIENT OUTREACH (OUTPATIENT)
Dept: CARE COORDINATION | Facility: CLINIC | Age: 84
End: 2024-03-15
Payer: MEDICARE

## 2024-03-15 NOTE — PROGRESS NOTES
Call  after hospitalization.  At time of outreach call the patient feels as if their condition has returned to baseline since last visit. Spoke with daughter Ivette.   Dr. Ramicone has ordered additional 7 day heart monitor, then will fu with Dr. Mccurdy next week, may need a pacemaker.

## 2024-03-19 PROBLEM — Z09 HOSPITAL DISCHARGE FOLLOW-UP: Chronic | Status: ACTIVE | Noted: 2024-03-19

## 2024-03-19 PROBLEM — F32.A DEPRESSION: Status: RESOLVED | Noted: 2023-02-23 | Resolved: 2024-03-19

## 2024-03-19 PROBLEM — I44.2: Chronic | Status: ACTIVE | Noted: 2024-03-01

## 2024-03-19 PROBLEM — H60.319: Status: RESOLVED | Noted: 2023-02-23 | Resolved: 2024-03-19

## 2024-03-19 PROBLEM — Z95.2 S/P TAVR (TRANSCATHETER AORTIC VALVE REPLACEMENT): Chronic | Status: ACTIVE | Noted: 2024-01-29

## 2024-03-19 PROBLEM — M47.812 SPONDYLOSIS OF CERVICAL REGION WITHOUT MYELOPATHY OR RADICULOPATHY: Status: RESOLVED | Noted: 2023-04-05 | Resolved: 2024-03-19

## 2024-03-19 PROBLEM — R01.1 MURMUR, CARDIAC: Status: RESOLVED | Noted: 2023-02-23 | Resolved: 2024-03-19

## 2024-03-19 PROBLEM — M54.17 LUMBOSACRAL RADICULOPATHY AT S1: Status: RESOLVED | Noted: 2023-02-23 | Resolved: 2024-03-19

## 2024-03-19 PROBLEM — M77.9 ENTHESOPATHY: Status: RESOLVED | Noted: 2023-02-23 | Resolved: 2024-03-19

## 2024-03-19 PROBLEM — J01.90 ACUTE SINUSITIS: Status: RESOLVED | Noted: 2023-02-23 | Resolved: 2024-03-19

## 2024-03-19 PROBLEM — R73.9 HYPERGLYCEMIA: Status: RESOLVED | Noted: 2023-02-23 | Resolved: 2024-03-19

## 2024-03-19 PROBLEM — M25.572 SINUS TARSI SYNDROME OF LEFT ANKLE: Status: RESOLVED | Noted: 2023-02-23 | Resolved: 2024-03-19

## 2024-03-19 PROBLEM — S62.523A CLOSED FRACTURE OF DISTAL PHALANX OF THUMB: Status: RESOLVED | Noted: 2023-02-23 | Resolved: 2024-03-19

## 2024-03-20 ENCOUNTER — OFFICE VISIT (OUTPATIENT)
Dept: CARDIOLOGY | Facility: CLINIC | Age: 84
End: 2024-03-20
Payer: MEDICARE

## 2024-03-20 VITALS
DIASTOLIC BLOOD PRESSURE: 82 MMHG | OXYGEN SATURATION: 97 % | HEART RATE: 68 BPM | BODY MASS INDEX: 31.64 KG/M2 | SYSTOLIC BLOOD PRESSURE: 136 MMHG | WEIGHT: 162 LBS

## 2024-03-20 DIAGNOSIS — I44.2: Primary | Chronic | ICD-10-CM

## 2024-03-20 DIAGNOSIS — E78.2 MIXED HYPERLIPIDEMIA: Chronic | ICD-10-CM

## 2024-03-20 DIAGNOSIS — I34.2 NONRHEUMATIC MITRAL VALVE STENOSIS: Chronic | ICD-10-CM

## 2024-03-20 DIAGNOSIS — Z95.2 S/P TAVR (TRANSCATHETER AORTIC VALVE REPLACEMENT): Chronic | ICD-10-CM

## 2024-03-20 DIAGNOSIS — Z09 HOSPITAL DISCHARGE FOLLOW-UP: Chronic | ICD-10-CM

## 2024-03-20 DIAGNOSIS — I44.1 AV BLOCK, MOBITZ II: ICD-10-CM

## 2024-03-20 DIAGNOSIS — I10 PRIMARY HYPERTENSION: Chronic | ICD-10-CM

## 2024-03-20 PROCEDURE — 3075F SYST BP GE 130 - 139MM HG: CPT | Performed by: INTERNAL MEDICINE

## 2024-03-20 PROCEDURE — 99215 OFFICE O/P EST HI 40 MIN: CPT | Performed by: INTERNAL MEDICINE

## 2024-03-20 PROCEDURE — 1036F TOBACCO NON-USER: CPT | Performed by: INTERNAL MEDICINE

## 2024-03-20 PROCEDURE — 1160F RVW MEDS BY RX/DR IN RCRD: CPT | Performed by: INTERNAL MEDICINE

## 2024-03-20 PROCEDURE — 3079F DIAST BP 80-89 MM HG: CPT | Performed by: INTERNAL MEDICINE

## 2024-03-20 PROCEDURE — 1159F MED LIST DOCD IN RCRD: CPT | Performed by: INTERNAL MEDICINE

## 2024-03-20 NOTE — PATIENT INSTRUCTIONS
1. Aortic stenosis.Critical with mild to mod A I and mild/mod MS.  The peak gradient is 95 mmHg.  This is increased significantly from 2021.  1/30/2024 status post TAVR with a number 29 mm evolute valve.  Doing great.  Symptoms have improved.  Less shortness of breath and improved energy.  Valve is well-seated on the last echo February 2024.     3. Shortness of breath.  Substantially improved with a TAVR    4. Hypertension. Well-controlled     5. Hyperlipidemia. Followed Dr. Allen.  1/31/2024 LDL 68 HDL 51 triglycerides 59    6. MS Mild.  At this age I would leave this alone    7.  Bradycardia.  Transient complete heart block after the TAVR requiring temporary transvenous pacing.  Follow-up Holter nothing obvious found.  Seen by Dr. Ramicone 3/12/2024. A repeat 7 day holter done by 3/13/24. Results are still pending I do not believe any indication for pacemaker will be required.     I personally reviewed the echocardiographic imaging, TAVR CT, and the TAVR procedure images.  I will have her return to see me in 6 months.

## 2024-03-20 NOTE — PROGRESS NOTES
Referred by No ref. provider found    HPI I am seeing the patient for a 4-month follow-up.  She underwent implantation of a TAVR which was successful.  She had transient complete heart block and required temporary pacing.  This resolved.  She subsequently was followed up with Dr. Ramicone and no further high-grade AV block was noted.  She has had no dizziness or lightheadedness.  Her breathing has improved and her energy level has improved as it was expected.    Past Medical History:  Problem List Items Addressed This Visit    None       Past Medical History:   Diagnosis Date    Aortic stenosis 11/15/2023    moderate    Benign neoplasm, unspecified site     Adenoma    Corns and callosities     Pre-ulcerative corn or callous    Hyperlipidemia 02/23/2023    Hypertension 02/23/2023    Impingement syndrome of unspecified shoulder     Shoulder impingement syndrome    Mitral stenosis 11/15/2023    Personal history of other diseases of the nervous system and sense organs     History of carpal tunnel syndrome    Personal history of other diseases of urinary system     History of hematuria    Personal history of other endocrine, nutritional and metabolic disease     History of hypokalemia    Personal history of other endocrine, nutritional and metabolic disease     History of obesity    Personal history of other endocrine, nutritional and metabolic disease     History of hyperlipidemia    Personal history of other endocrine, nutritional and metabolic disease     History of hypothyroidism    Personal history of other specified conditions     History of nausea    Shortness of breath 11/15/2023    Unspecified tear of unspecified meniscus, current injury, left knee, initial encounter     Tear of cartilage of left knee             Past Surgical History:  She has a past surgical history that includes Other surgical history (07/12/2018); Appendectomy (07/12/2018); Knee surgery (09/19/2018); Cardiac catheterization (N/A, 1/16/2024);  Cardiac catheterization (N/A, 1/30/2024); Cardiac catheterization (N/A, 1/30/2024); Cardiac catheterization (N/A, 1/30/2024); and Anomalous pulmonary venous return repair, total (N/A, 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      Social History:  She reports that she has never smoked. She has never been exposed to tobacco smoke. She has never used smokeless tobacco. She reports that she does not drink alcohol and does not use drugs.    Family History:  Family History   Problem Relation Name Age of Onset    Tuberculosis Mother      Stroke Sister      Coronary artery disease Sister      Diabetes Brother          Allergies:  Prednisone, Aspirin, and Sulfa (sulfonamide antibiotics)    Outpatient Medications:  Current Outpatient Medications   Medication Instructions    acetaminophen (Tylenol) 325 mg tablet oral, Every 6 hours, 2 tab(s) orally every 6 hours-continue routinely around the clock for next 72 hours then decrease to as needed for mild pain or temperature    amoxicillin (Amoxil) 500 mg capsule Take 4 caps (2000 mg) 30-60mins prior to your dental appointment    aspirin 81 mg, oral, Daily    busPIRone (BUSPAR) 10 mg, oral, 2 times daily    cholecalciferol (VITAMIN D-3) 25 mcg, oral, Daily    fluticasone (Flonase) 50 mcg/actuation nasal spray 1 spray, Each Nostril, Daily    hydroCHLOROthiazide (HYDRODIURIL) 25 mg, oral, Daily    ipratropium (Atrovent) 42 mcg (0.06 %) nasal spray 2 sprays, Each Nostril, 3 times daily PRN    levothyroxine (Synthroid, Levoxyl) 50 mcg tablet TAKE ONE TABLET DAILY MONDAY THROUGH FRIDAY AND 2 TABLETS ON SATURDAY AND SUNDAY    lovastatin (Mevacor) 40 mg tablet TAKE 1 TABLET EVERY DAY AT DINNER    omeprazole (PRILOSEC) 20 mg, oral, Daily    polyethylene glycol (GLYCOLAX, MIRALAX) 17 g, oral, 2 times daily, 17 gram(s) orally 2 times a day as needed for constipation    sertraline (ZOLOFT) 100 mg, oral, Daily        Last Recorded Vitals:  There were no vitals filed for this visit.    Physical Exam    Physical  Patient is  "alert and oriented x3.  HEENT is unremarkable mucous members are moist  Neck no JVP no bruits upstrokes are full no thyromegaly  Lungs are clear bilaterally.  No wheezing crackles or rales  Heart regular rhythm normal S1-S2 there is no S3 soft systolic ejection murmur abdomen is soft vessels are positive nontender nondistended no organomegaly no pulsatile masses  Extremities have no edema.  Distal pulses present palpable.  Neuro is grossly nonfocal  Skin has no rashes     Last Labs:  CBC -  Lab Results   Component Value Date    WBC 2.7 (L) 02/05/2024    HGB 11.5 (L) 02/05/2024    HCT 35.4 (L) 02/05/2024    MCV 93 02/05/2024     (L) 02/05/2024       CMP -  Lab Results   Component Value Date    CALCIUM 9.1 02/05/2024    PHOS 4.6 01/26/2024    PROT 5.8 (L) 01/31/2024    ALBUMIN 3.5 01/31/2024    AST 18 01/31/2024    ALT 7 01/31/2024    ALKPHOS 41 01/31/2024    BILITOT 0.4 01/31/2024       LIPID PANEL -   Lab Results   Component Value Date    CHOL 131 01/30/2024    HDL 51.1 01/30/2024    CHHDL 2.6 01/30/2024    VLDL 12 01/30/2024    TRIG 59 01/30/2024    NHDL 80 01/30/2024       RENAL FUNCTION PANEL -   Lab Results   Component Value Date    K 4.6 02/05/2024    PHOS 4.6 01/26/2024       No results found for: \"BNP\", \"HGBA1C\"  Procedure    Holter 3/13/2024    Echo 2/26/2024 EF 65%, DDF, LAE, LVOT with mild MS, well-seated #29 evolute TAVR peak gradient 16 mm    Holter 1/31/2024 3-second pause    TAVR 1/30/2024 Evolut fracture 29 mm complicated with transient complete heart block    Cardiac catheterization 1/16/2024 luminal irregularities    Echo 9/2023 EF 60%, Mild MS with mild/mod MR, Severe A S A V max 4.9m/s 95/55mmHg with mild to mod A I    ECHO [04/22/2021]: EF 60-65%. SD = impaired relaxation pattern. MV mod thickened. Mild mitral stenosis. Mod MAC. Mod AS (AVmax 3 M/s, mean gradient 20 mmHg, DI 0.36, NEHEMIAH 1 cm2). Mild AR.          Assessment/Plan   1. Aortic stenosis.Critical with mild to mod A I and mild/mod " MS.  The peak gradient is 95 mmHg.  This is increased significantly from 2021.  1/30/2024 status post TAVR with a number 29 mm evolute valve.  Doing great.  Symptoms have improved.  Less shortness of breath and improved energy.  Valve is well-seated on the last echo February 2024.     3. Shortness of breath.  Substantially improved with a TAVR    4. Hypertension. Well-controlled     5. Hyperlipidemia. Followed Dr. Allen.  1/31/2024 LDL 68 HDL 51 triglycerides 59    6. MS Mild.  At this age I would leave this alone    7.  Bradycardia.  Transient complete heart block after the TAVR requiring temporary transvenous pacing.  Follow-up Holter nothing obvious found.  Seen by Dr. Ramicone 3/12/2024. A repeat 7 day holter done by 3/13/24. Results are still pending I do not believe any indication for pacemaker will be required.     I personally reviewed the echocardiographic imaging, TAVR CT, and the TAVR procedure images.  I will have her return to see me in 6 months.    Fabiola Mccurdy MD     Instructions and follow up

## 2024-04-11 ENCOUNTER — TELEPHONE (OUTPATIENT)
Dept: CARDIOLOGY | Facility: CLINIC | Age: 84
End: 2024-04-11
Payer: MEDICARE

## 2024-04-11 NOTE — TELEPHONE ENCOUNTER
Daughter called: Was waiting to hear back in regards to the monitor results and if a pacemaker was necessary

## 2024-04-18 DIAGNOSIS — I10 HYPERTENSION, UNSPECIFIED TYPE: ICD-10-CM

## 2024-04-18 DIAGNOSIS — E78.5 HYPERLIPIDEMIA, UNSPECIFIED HYPERLIPIDEMIA TYPE: ICD-10-CM

## 2024-04-18 RX ORDER — HYDROCHLOROTHIAZIDE 25 MG/1
25 TABLET ORAL DAILY
Qty: 90 TABLET | Refills: 3 | Status: SHIPPED | OUTPATIENT
Start: 2024-04-18 | End: 2025-04-18

## 2024-04-18 RX ORDER — LOVASTATIN 40 MG/1
TABLET ORAL
Qty: 90 TABLET | Refills: 3 | Status: SHIPPED | OUTPATIENT
Start: 2024-04-18

## 2024-04-18 NOTE — TELEPHONE ENCOUNTER
Pt coming next month but needed refill on hydrochlothiazide 25 mg once daily & lovastatin 40 mg once daily to [facility] [phone]  Allergies to prednisone, aspirin & sulfa  Ty  Pt is almost out

## 2024-05-01 ENCOUNTER — PATIENT OUTREACH (OUTPATIENT)
Dept: PRIMARY CARE | Facility: CLINIC | Age: 84
End: 2024-05-01
Payer: MEDICARE

## 2024-05-01 NOTE — PROGRESS NOTES
Patient has met target of no readmission for (90) days post hospital discharge and is graduated from Transitional Care Management program at this time.  Spoke with Elham. Ayo is doing really well, will not need the pacemaker. No questions.

## 2024-05-22 ENCOUNTER — APPOINTMENT (OUTPATIENT)
Dept: PRIMARY CARE | Facility: CLINIC | Age: 84
End: 2024-05-22
Payer: MEDICARE

## 2024-07-18 ENCOUNTER — APPOINTMENT (OUTPATIENT)
Dept: PRIMARY CARE | Facility: CLINIC | Age: 84
End: 2024-07-18
Payer: MEDICARE

## 2024-07-18 ENCOUNTER — LAB (OUTPATIENT)
Dept: LAB | Facility: LAB | Age: 84
End: 2024-07-18
Payer: MEDICARE

## 2024-07-18 VITALS
WEIGHT: 168.1 LBS | SYSTOLIC BLOOD PRESSURE: 120 MMHG | HEIGHT: 60 IN | OXYGEN SATURATION: 96 % | HEART RATE: 69 BPM | DIASTOLIC BLOOD PRESSURE: 70 MMHG | BODY MASS INDEX: 33 KG/M2

## 2024-07-18 DIAGNOSIS — R73.9 HYPERGLYCEMIA: ICD-10-CM

## 2024-07-18 DIAGNOSIS — Z79.899 HIGH RISK MEDICATION USE: ICD-10-CM

## 2024-07-18 DIAGNOSIS — I10 PRIMARY HYPERTENSION: ICD-10-CM

## 2024-07-18 DIAGNOSIS — E06.3 HYPOTHYROIDISM DUE TO HASHIMOTO'S THYROIDITIS: ICD-10-CM

## 2024-07-18 DIAGNOSIS — D64.9 ANEMIA, UNSPECIFIED TYPE: ICD-10-CM

## 2024-07-18 DIAGNOSIS — E03.8 HYPOTHYROIDISM DUE TO HASHIMOTO'S THYROIDITIS: ICD-10-CM

## 2024-07-18 DIAGNOSIS — M81.0 OSTEOPOROSIS, UNSPECIFIED OSTEOPOROSIS TYPE, UNSPECIFIED PATHOLOGICAL FRACTURE PRESENCE: ICD-10-CM

## 2024-07-18 DIAGNOSIS — Z00.00 ANNUAL PHYSICAL EXAM: Primary | ICD-10-CM

## 2024-07-18 LAB
BASOPHILS # BLD AUTO: 0.02 X10*3/UL (ref 0–0.1)
BASOPHILS NFR BLD AUTO: 0.6 %
EOSINOPHIL # BLD AUTO: 0.07 X10*3/UL (ref 0–0.4)
EOSINOPHIL NFR BLD AUTO: 2.1 %
ERYTHROCYTE [DISTWIDTH] IN BLOOD BY AUTOMATED COUNT: 14.4 % (ref 11.5–14.5)
EST. AVERAGE GLUCOSE BLD GHB EST-MCNC: 123 MG/DL
HBA1C MFR BLD: 5.9 %
HCT VFR BLD AUTO: 38.5 % (ref 36–46)
HGB BLD-MCNC: 12.2 G/DL (ref 12–16)
IMM GRANULOCYTES # BLD AUTO: 0.01 X10*3/UL (ref 0–0.5)
IMM GRANULOCYTES NFR BLD AUTO: 0.3 % (ref 0–0.9)
LYMPHOCYTES # BLD AUTO: 1.49 X10*3/UL (ref 0.8–3)
LYMPHOCYTES NFR BLD AUTO: 44.2 %
MCH RBC QN AUTO: 28 PG (ref 26–34)
MCHC RBC AUTO-ENTMCNC: 31.7 G/DL (ref 32–36)
MCV RBC AUTO: 88 FL (ref 80–100)
MONOCYTES # BLD AUTO: 0.47 X10*3/UL (ref 0.05–0.8)
MONOCYTES NFR BLD AUTO: 13.9 %
NEUTROPHILS # BLD AUTO: 1.31 X10*3/UL (ref 1.6–5.5)
NEUTROPHILS NFR BLD AUTO: 38.9 %
NRBC BLD-RTO: 0 /100 WBCS (ref 0–0)
PLATELET # BLD AUTO: 155 X10*3/UL (ref 150–450)
RBC # BLD AUTO: 4.36 X10*6/UL (ref 4–5.2)
WBC # BLD AUTO: 3.4 X10*3/UL (ref 4.4–11.3)

## 2024-07-18 PROCEDURE — 1160F RVW MEDS BY RX/DR IN RCRD: CPT | Performed by: FAMILY MEDICINE

## 2024-07-18 PROCEDURE — 1036F TOBACCO NON-USER: CPT | Performed by: FAMILY MEDICINE

## 2024-07-18 PROCEDURE — 84443 ASSAY THYROID STIM HORMONE: CPT

## 2024-07-18 PROCEDURE — 1125F AMNT PAIN NOTED PAIN PRSNT: CPT | Performed by: FAMILY MEDICINE

## 2024-07-18 PROCEDURE — 83036 HEMOGLOBIN GLYCOSYLATED A1C: CPT

## 2024-07-18 PROCEDURE — 1159F MED LIST DOCD IN RCRD: CPT | Performed by: FAMILY MEDICINE

## 2024-07-18 PROCEDURE — 3078F DIAST BP <80 MM HG: CPT | Performed by: FAMILY MEDICINE

## 2024-07-18 PROCEDURE — 99214 OFFICE O/P EST MOD 30 MIN: CPT | Performed by: FAMILY MEDICINE

## 2024-07-18 PROCEDURE — 3074F SYST BP LT 130 MM HG: CPT | Performed by: FAMILY MEDICINE

## 2024-07-18 PROCEDURE — 1170F FXNL STATUS ASSESSED: CPT | Performed by: FAMILY MEDICINE

## 2024-07-18 PROCEDURE — 85025 COMPLETE CBC W/AUTO DIFF WBC: CPT

## 2024-07-18 PROCEDURE — 80053 COMPREHEN METABOLIC PANEL: CPT

## 2024-07-18 PROCEDURE — 36415 COLL VENOUS BLD VENIPUNCTURE: CPT

## 2024-07-18 PROCEDURE — G0439 PPPS, SUBSEQ VISIT: HCPCS | Performed by: FAMILY MEDICINE

## 2024-07-18 RX ORDER — SERTRALINE HYDROCHLORIDE 100 MG/1
100 TABLET, FILM COATED ORAL 2 TIMES DAILY
COMMUNITY
Start: 2024-05-07

## 2024-07-18 ASSESSMENT — ACTIVITIES OF DAILY LIVING (ADL)
DRESSING: INDEPENDENT
DOING_HOUSEWORK: INDEPENDENT
MANAGING_FINANCES: NEEDS ASSISTANCE
GROCERY_SHOPPING: INDEPENDENT
BATHING: INDEPENDENT
TAKING_MEDICATION: INDEPENDENT

## 2024-07-18 ASSESSMENT — ENCOUNTER SYMPTOMS
LOSS OF SENSATION IN FEET: 0
DEPRESSION: 0
OCCASIONAL FEELINGS OF UNSTEADINESS: 1

## 2024-07-18 ASSESSMENT — PATIENT HEALTH QUESTIONNAIRE - PHQ9
10. IF YOU CHECKED OFF ANY PROBLEMS, HOW DIFFICULT HAVE THESE PROBLEMS MADE IT FOR YOU TO DO YOUR WORK, TAKE CARE OF THINGS AT HOME, OR GET ALONG WITH OTHER PEOPLE: SOMEWHAT DIFFICULT
SUM OF ALL RESPONSES TO PHQ9 QUESTIONS 1 AND 2: 2
2. FEELING DOWN, DEPRESSED OR HOPELESS: SEVERAL DAYS
1. LITTLE INTEREST OR PLEASURE IN DOING THINGS: SEVERAL DAYS

## 2024-07-18 ASSESSMENT — PAIN SCALES - GENERAL: PAINLEVEL: 4

## 2024-07-18 NOTE — PROGRESS NOTES
Subjective   Patient ID: Ayo Bueno is a 83 y.o. female who presents for Medicare Annual Wellness Visit Subsequent (Medicare annual wellness exam. ) and Annual Exam (Annual physical exam. ).    HPI   Patient here for annual checkup.  She is accompanied by her daughter  She is generally doing okay.  She complains of various musculoskeletal aches and pains.  Nothing that seems to limit her.  She is sad due to the fact she lost her  in the last year.  She does get out of the house regularly with her family members.  Review of Systems    Objective   /70 (BP Location: Left arm, Patient Position: Sitting, BP Cuff Size: Adult)   Pulse 69   Ht 1.524 m (5')   Wt 76.2 kg (168 lb 1.6 oz)   SpO2 96%   BMI 32.83 kg/m²     Physical Exam  Alert, pleasant and in no acute distress.  Heart: Regular rate and rhythm   Lungs: Clear to auscultation  Lower extremities: No edema  Assessment/Plan   Problem List Items Addressed This Visit             ICD-10-CM    Hypertension (Chronic) I10    Hypothyroidism E03.9    Relevant Orders    TSH with reflex to Free T4 if abnormal     Other Visit Diagnoses         Codes    Annual physical exam    -  Primary Z00.00    Anemia, unspecified type     D64.9    Relevant Orders    CBC and Auto Differential    Hyperglycemia     R73.9    Relevant Orders    Hemoglobin A1C    High risk medication use     Z79.899    Relevant Orders    CBC and Auto Differential    Comprehensive Metabolic Panel    Osteoporosis, unspecified osteoporosis type, unspecified pathological fracture presence     M81.0    Relevant Orders    XR DEXA bone density        1.  Health maintenance: Care gaps addressed.  Patient's daughter feels she had her tetanus and varicella vaccines updated a couple of years ago.  They will check for records.  Bone density scan  2.  Hypertension: Stable and under good control.  3.  Hypothyroidism: TSH has been normal.  4.  Anemia: Mild anemia on previous blood.  Will recheck a  CBC.  5.  Hyperglycemia: Mild hyperglycemia on previous labs.  We will get an A1c.  Discussed importance of trying to avoid eating too many sweets.  Long talk with patient and daughter regarding keeping active.  Discussed avenues to success.  Recommend daily therapeutic type exercises to help the spine as well as strength and mobility.  Will contact family in 2 to 5 days with results

## 2024-07-19 LAB
ALBUMIN SERPL BCP-MCNC: 4.3 G/DL (ref 3.4–5)
ALP SERPL-CCNC: 51 U/L (ref 33–136)
ALT SERPL W P-5'-P-CCNC: 13 U/L (ref 7–45)
ANION GAP SERPL CALC-SCNC: 14 MMOL/L (ref 10–20)
AST SERPL W P-5'-P-CCNC: 21 U/L (ref 9–39)
BILIRUB SERPL-MCNC: 0.6 MG/DL (ref 0–1.2)
BUN SERPL-MCNC: 21 MG/DL (ref 6–23)
CALCIUM SERPL-MCNC: 9.4 MG/DL (ref 8.6–10.6)
CHLORIDE SERPL-SCNC: 101 MMOL/L (ref 98–107)
CO2 SERPL-SCNC: 31 MMOL/L (ref 21–32)
CREAT SERPL-MCNC: 0.79 MG/DL (ref 0.5–1.05)
EGFRCR SERPLBLD CKD-EPI 2021: 75 ML/MIN/1.73M*2
GLUCOSE SERPL-MCNC: 99 MG/DL (ref 74–99)
POTASSIUM SERPL-SCNC: 4.4 MMOL/L (ref 3.5–5.3)
PROT SERPL-MCNC: 7.4 G/DL (ref 6.4–8.2)
SODIUM SERPL-SCNC: 142 MMOL/L (ref 136–145)
TSH SERPL-ACNC: 2.05 MIU/L (ref 0.44–3.98)

## 2024-08-06 ENCOUNTER — HOSPITAL ENCOUNTER (OUTPATIENT)
Dept: RADIOLOGY | Facility: CLINIC | Age: 84
Discharge: HOME | End: 2024-08-06
Payer: MEDICARE

## 2024-08-06 DIAGNOSIS — M81.0 OSTEOPOROSIS, UNSPECIFIED OSTEOPOROSIS TYPE, UNSPECIFIED PATHOLOGICAL FRACTURE PRESENCE: ICD-10-CM

## 2024-08-06 PROCEDURE — 77080 DXA BONE DENSITY AXIAL: CPT | Performed by: RADIOLOGY

## 2024-08-06 PROCEDURE — 77080 DXA BONE DENSITY AXIAL: CPT

## 2024-08-06 ASSESSMENT — LIFESTYLE VARIABLES
3_OR_MORE_DRINKS_PER_DAY: N
CURRENT_SMOKER: N

## 2024-08-09 ENCOUNTER — NURSING HOME VISIT (OUTPATIENT)
Dept: POST ACUTE CARE | Facility: EXTERNAL LOCATION | Age: 84
End: 2024-08-09
Payer: MEDICARE

## 2024-08-09 DIAGNOSIS — E06.3 HYPOTHYROIDISM DUE TO HASHIMOTO THYROIDITIS: ICD-10-CM

## 2024-08-09 DIAGNOSIS — F41.9 ANXIETY: Primary | ICD-10-CM

## 2024-08-09 DIAGNOSIS — I10 PRIMARY HYPERTENSION: Chronic | ICD-10-CM

## 2024-08-09 DIAGNOSIS — E78.2 MIXED HYPERLIPIDEMIA: Chronic | ICD-10-CM

## 2024-08-09 PROCEDURE — 99308 SBSQ NF CARE LOW MDM 20: CPT | Performed by: INTERNAL MEDICINE

## 2024-08-09 NOTE — LETTER
Patient: Ayo Bueno  : 1940    Encounter Date: 2024    PROGRESS NOTE    Subjective  Chief complaint: Ayo Bueno is a 84 y.o. female who is an acute skilled patient being seen and evaluated for weakness    HPI:  Patient continues to work towards her goals in therapy.  No acute issues.          Objective  Vital signs:   Vitals within range    Physical Exam  Constitutional:       General: She is not in acute distress.  Eyes:      Extraocular Movements: Extraocular movements intact.   Pulmonary:      Effort: Pulmonary effort is normal.   Musculoskeletal:      Cervical back: Neck supple.   Neurological:      Mental Status: She is alert.   Psychiatric:         Mood and Affect: Mood normal.         Behavior: Behavior is cooperative.         Assessment/Plan  Problem List Items Addressed This Visit       Hyperlipidemia (Chronic)     Continue current medications         Hypertension (Chronic)     Continue current medications         Hypothyroidism     Continue current medications         Anxiety - Primary     Continue current medications          Medications, treatments, and labs reviewed  Continue medications and treatments as listed in EMR    Alec Barker DO    Scribe Attestation  Nila GUIDOibe   attest that this documentation has been prepared under the direction and in the presence of Alec Barker DO    Provider Attestation - Scribe documentation  All medical record entries made by the Scribe were at my direction and personally dictated by me. I have reviewed the chart and agree that the record accurately reflects my personal performance of the history, physical exam, discussion and plan.      Electronically Signed By: Alec Barker DO   24  9:34 PM

## 2024-08-27 ENCOUNTER — APPOINTMENT (OUTPATIENT)
Dept: CARDIOLOGY | Facility: HOSPITAL | Age: 84
DRG: 690 | End: 2024-08-27
Payer: MEDICARE

## 2024-08-27 ENCOUNTER — APPOINTMENT (OUTPATIENT)
Dept: RADIOLOGY | Facility: HOSPITAL | Age: 84
DRG: 690 | End: 2024-08-27
Payer: MEDICARE

## 2024-08-27 ENCOUNTER — HOSPITAL ENCOUNTER (INPATIENT)
Facility: HOSPITAL | Age: 84
LOS: 3 days | Discharge: NURSING, REHAB OR LONG-TERM CARE | End: 2024-09-01
Attending: EMERGENCY MEDICINE | Admitting: STUDENT IN AN ORGANIZED HEALTH CARE EDUCATION/TRAINING PROGRAM
Payer: MEDICARE

## 2024-08-27 DIAGNOSIS — N30.90 CYSTITIS: ICD-10-CM

## 2024-08-27 DIAGNOSIS — R27.0 ATAXIA: Primary | ICD-10-CM

## 2024-08-27 DIAGNOSIS — H81.20 VESTIBULAR NEURONITIS, UNSPECIFIED EAR: ICD-10-CM

## 2024-08-27 DIAGNOSIS — R42 DIZZINESS: ICD-10-CM

## 2024-08-27 LAB
ALBUMIN SERPL BCP-MCNC: 4.1 G/DL (ref 3.4–5)
ALP SERPL-CCNC: 51 U/L (ref 33–136)
ALT SERPL W P-5'-P-CCNC: 12 U/L (ref 7–45)
ANION GAP SERPL CALC-SCNC: 11 MMOL/L (ref 10–20)
APPEARANCE UR: ABNORMAL
AST SERPL W P-5'-P-CCNC: 20 U/L (ref 9–39)
BACTERIA #/AREA URNS AUTO: ABNORMAL /HPF
BASOPHILS # BLD AUTO: 0.02 X10*3/UL (ref 0–0.1)
BASOPHILS NFR BLD AUTO: 0.6 %
BILIRUB SERPL-MCNC: 0.6 MG/DL (ref 0–1.2)
BILIRUB UR STRIP.AUTO-MCNC: NEGATIVE MG/DL
BNP SERPL-MCNC: 96 PG/ML (ref 0–99)
BUN SERPL-MCNC: 24 MG/DL (ref 6–23)
CALCIUM SERPL-MCNC: 9.5 MG/DL (ref 8.6–10.3)
CARDIAC TROPONIN I PNL SERPL HS: 19 NG/L (ref 0–13)
CARDIAC TROPONIN I PNL SERPL HS: 19 NG/L (ref 0–13)
CHLORIDE SERPL-SCNC: 100 MMOL/L (ref 98–107)
CO2 SERPL-SCNC: 32 MMOL/L (ref 21–32)
COLOR UR: YELLOW
CREAT SERPL-MCNC: 0.8 MG/DL (ref 0.5–1.05)
EGFRCR SERPLBLD CKD-EPI 2021: 74 ML/MIN/1.73M*2
EOSINOPHIL # BLD AUTO: 0.06 X10*3/UL (ref 0–0.4)
EOSINOPHIL NFR BLD AUTO: 1.8 %
ERYTHROCYTE [DISTWIDTH] IN BLOOD BY AUTOMATED COUNT: 14.1 % (ref 11.5–14.5)
GLUCOSE SERPL-MCNC: 105 MG/DL (ref 74–99)
GLUCOSE UR STRIP.AUTO-MCNC: NORMAL MG/DL
HCT VFR BLD AUTO: 38.2 % (ref 36–46)
HGB BLD-MCNC: 12.1 G/DL (ref 12–16)
HYALINE CASTS #/AREA URNS AUTO: ABNORMAL /LPF
IMM GRANULOCYTES # BLD AUTO: 0.01 X10*3/UL (ref 0–0.5)
IMM GRANULOCYTES NFR BLD AUTO: 0.3 % (ref 0–0.9)
KETONES UR STRIP.AUTO-MCNC: NEGATIVE MG/DL
LEUKOCYTE ESTERASE UR QL STRIP.AUTO: ABNORMAL
LYMPHOCYTES # BLD AUTO: 1.46 X10*3/UL (ref 0.8–3)
LYMPHOCYTES NFR BLD AUTO: 42.8 %
MAGNESIUM SERPL-MCNC: 1.94 MG/DL (ref 1.6–2.4)
MCH RBC QN AUTO: 29.1 PG (ref 26–34)
MCHC RBC AUTO-ENTMCNC: 31.7 G/DL (ref 32–36)
MCV RBC AUTO: 92 FL (ref 80–100)
MONOCYTES # BLD AUTO: 0.44 X10*3/UL (ref 0.05–0.8)
MONOCYTES NFR BLD AUTO: 12.9 %
MUCOUS THREADS #/AREA URNS AUTO: ABNORMAL /LPF
NEUTROPHILS # BLD AUTO: 1.42 X10*3/UL (ref 1.6–5.5)
NEUTROPHILS NFR BLD AUTO: 41.6 %
NITRITE UR QL STRIP.AUTO: ABNORMAL
NRBC BLD-RTO: 0 /100 WBCS (ref 0–0)
PH UR STRIP.AUTO: 6.5 [PH]
PLATELET # BLD AUTO: 148 X10*3/UL (ref 150–450)
POTASSIUM SERPL-SCNC: 3.6 MMOL/L (ref 3.5–5.3)
PROT SERPL-MCNC: 7.4 G/DL (ref 6.4–8.2)
PROT UR STRIP.AUTO-MCNC: ABNORMAL MG/DL
RBC # BLD AUTO: 4.16 X10*6/UL (ref 4–5.2)
RBC # UR STRIP.AUTO: NEGATIVE /UL
RBC #/AREA URNS AUTO: ABNORMAL /HPF
SARS-COV-2 RNA RESP QL NAA+PROBE: NOT DETECTED
SODIUM SERPL-SCNC: 139 MMOL/L (ref 136–145)
SP GR UR STRIP.AUTO: 1.02
UROBILINOGEN UR STRIP.AUTO-MCNC: NORMAL MG/DL
WBC # BLD AUTO: 3.4 X10*3/UL (ref 4.4–11.3)
WBC #/AREA URNS AUTO: ABNORMAL /HPF

## 2024-08-27 PROCEDURE — 70496 CT ANGIOGRAPHY HEAD: CPT | Performed by: RADIOLOGY

## 2024-08-27 PROCEDURE — G0378 HOSPITAL OBSERVATION PER HR: HCPCS

## 2024-08-27 PROCEDURE — 99285 EMERGENCY DEPT VISIT HI MDM: CPT

## 2024-08-27 PROCEDURE — 73630 X-RAY EXAM OF FOOT: CPT | Mod: RIGHT SIDE | Performed by: RADIOLOGY

## 2024-08-27 PROCEDURE — 73630 X-RAY EXAM OF FOOT: CPT | Mod: RT

## 2024-08-27 PROCEDURE — 2550000001 HC RX 255 CONTRASTS: Performed by: EMERGENCY MEDICINE

## 2024-08-27 PROCEDURE — 2500000004 HC RX 250 GENERAL PHARMACY W/ HCPCS (ALT 636 FOR OP/ED): Performed by: NURSE PRACTITIONER

## 2024-08-27 PROCEDURE — 93005 ELECTROCARDIOGRAM TRACING: CPT

## 2024-08-27 PROCEDURE — 36415 COLL VENOUS BLD VENIPUNCTURE: CPT

## 2024-08-27 PROCEDURE — 84484 ASSAY OF TROPONIN QUANT: CPT

## 2024-08-27 PROCEDURE — 72125 CT NECK SPINE W/O DYE: CPT | Performed by: RADIOLOGY

## 2024-08-27 PROCEDURE — 70498 CT ANGIOGRAPHY NECK: CPT | Performed by: RADIOLOGY

## 2024-08-27 PROCEDURE — 87086 URINE CULTURE/COLONY COUNT: CPT | Mod: PARLAB | Performed by: NURSE PRACTITIONER

## 2024-08-27 PROCEDURE — 2500000004 HC RX 250 GENERAL PHARMACY W/ HCPCS (ALT 636 FOR OP/ED): Performed by: EMERGENCY MEDICINE

## 2024-08-27 PROCEDURE — 83880 ASSAY OF NATRIURETIC PEPTIDE: CPT | Performed by: NURSE PRACTITIONER

## 2024-08-27 PROCEDURE — 99223 1ST HOSP IP/OBS HIGH 75: CPT | Performed by: NURSE PRACTITIONER

## 2024-08-27 PROCEDURE — 81001 URINALYSIS AUTO W/SCOPE: CPT | Performed by: NURSE PRACTITIONER

## 2024-08-27 PROCEDURE — 72125 CT NECK SPINE W/O DYE: CPT

## 2024-08-27 PROCEDURE — 83735 ASSAY OF MAGNESIUM: CPT | Performed by: NURSE PRACTITIONER

## 2024-08-27 PROCEDURE — 70498 CT ANGIOGRAPHY NECK: CPT

## 2024-08-27 PROCEDURE — 96365 THER/PROPH/DIAG IV INF INIT: CPT

## 2024-08-27 PROCEDURE — 87635 SARS-COV-2 COVID-19 AMP PRB: CPT | Performed by: NURSE PRACTITIONER

## 2024-08-27 PROCEDURE — 84484 ASSAY OF TROPONIN QUANT: CPT | Performed by: NURSE PRACTITIONER

## 2024-08-27 PROCEDURE — 80053 COMPREHEN METABOLIC PANEL: CPT | Performed by: NURSE PRACTITIONER

## 2024-08-27 PROCEDURE — 71045 X-RAY EXAM CHEST 1 VIEW: CPT | Performed by: RADIOLOGY

## 2024-08-27 PROCEDURE — 36415 COLL VENOUS BLD VENIPUNCTURE: CPT | Performed by: NURSE PRACTITIONER

## 2024-08-27 PROCEDURE — 85025 COMPLETE CBC W/AUTO DIFF WBC: CPT | Performed by: NURSE PRACTITIONER

## 2024-08-27 PROCEDURE — 70450 CT HEAD/BRAIN W/O DYE: CPT

## 2024-08-27 PROCEDURE — 71045 X-RAY EXAM CHEST 1 VIEW: CPT

## 2024-08-27 RX ORDER — CEFTRIAXONE 1 G/50ML
1 INJECTION, SOLUTION INTRAVENOUS EVERY 24 HOURS
Status: DISCONTINUED | OUTPATIENT
Start: 2024-08-28 | End: 2024-09-01 | Stop reason: HOSPADM

## 2024-08-27 RX ORDER — ACETAMINOPHEN 325 MG/1
650 TABLET ORAL EVERY 4 HOURS PRN
Status: DISCONTINUED | OUTPATIENT
Start: 2024-08-27 | End: 2024-09-01 | Stop reason: HOSPADM

## 2024-08-27 RX ORDER — SODIUM CHLORIDE, SODIUM LACTATE, POTASSIUM CHLORIDE, CALCIUM CHLORIDE 600; 310; 30; 20 MG/100ML; MG/100ML; MG/100ML; MG/100ML
50 INJECTION, SOLUTION INTRAVENOUS CONTINUOUS
Status: ACTIVE | OUTPATIENT
Start: 2024-08-27 | End: 2024-08-28

## 2024-08-27 RX ORDER — ONDANSETRON HYDROCHLORIDE 2 MG/ML
4 INJECTION, SOLUTION INTRAVENOUS EVERY 4 HOURS PRN
Status: DISCONTINUED | OUTPATIENT
Start: 2024-08-27 | End: 2024-09-01 | Stop reason: HOSPADM

## 2024-08-27 RX ORDER — CEFTRIAXONE 1 G/50ML
1 INJECTION, SOLUTION INTRAVENOUS ONCE
Status: COMPLETED | OUTPATIENT
Start: 2024-08-27 | End: 2024-08-27

## 2024-08-27 RX ORDER — HEPARIN SODIUM 5000 [USP'U]/ML
5000 INJECTION, SOLUTION INTRAVENOUS; SUBCUTANEOUS EVERY 8 HOURS
Status: DISCONTINUED | OUTPATIENT
Start: 2024-08-27 | End: 2024-09-01 | Stop reason: HOSPADM

## 2024-08-27 ASSESSMENT — COLUMBIA-SUICIDE SEVERITY RATING SCALE - C-SSRS
2. HAVE YOU ACTUALLY HAD ANY THOUGHTS OF KILLING YOURSELF?: NO
6. HAVE YOU EVER DONE ANYTHING, STARTED TO DO ANYTHING, OR PREPARED TO DO ANYTHING TO END YOUR LIFE?: NO
1. IN THE PAST MONTH, HAVE YOU WISHED YOU WERE DEAD OR WISHED YOU COULD GO TO SLEEP AND NOT WAKE UP?: NO

## 2024-08-27 NOTE — ED TRIAGE NOTES
Secondary to patient volumes and overcrowding, I performed a brief medical screening exam of the patient in triage, as the patient awaits space in the main ED.    History of Present Illness:  Ayo Bueno presents with fall.  Patient states she was getting up off the ground to go to the bathroom and the next thing she knew she was falling down.      Physical Exam:  General - In no acute distress  Respiratory - Breathing comfortably  Cardiac - Normal S1, S2, no m/g/r  Neurologic - Moving all extremities  Abdomen -bowel sounds present, no CVAT, nontender    Medical Decision Making:  Patient will require further evaluation in the main ED.    Initial diagnostic tests were ordered from triage.      The patient demonstrates understanding that this initial evaluation is a brief medical screening exam and the expectation is that they await for space in the main ED to be further evaluated.  The patient understands that, if they leave prior to further evaluation in the main ED after this initial evaluation in triage, they are doing so under their own accord knowing that their evaluation/work-up is not yet complete. The patient also understands that any preliminary diagnostic results, including abnormalities, may not be shared with them, if they choose to leave prior to further evaluation in the main ED.

## 2024-08-27 NOTE — ED PROVIDER NOTES
EMERGENCY DEPARTMENT ENCOUNTER      Pt Name: Ayo Bueno  MRN: [id number]  Birthdate 1940  Date of evaluation: 8/27/2024  Provider: Riki Ochoa DO    CHIEF COMPLAINT       Chief Complaint   Patient presents with    Fall     States stood up felt dizzy lost balance think she hit her head, no blood thinners states she also has pain to right great toe. No other complaints offered at this time         HISTORY OF PRESENT ILLNESS    Patient is an 83-year-old female presenting today after sustaining a fall patient felt dizzy.  She felt off balance distally that she is able to describe the dizziness.  No nausea.  She has not had symptoms like this previously.  During the fall she fell backwards and hit her head.  Also complaining of pain in her right great toe from the fall.  No other injuries reported.  Patient is not on any blood thinners.  She does take a baby aspirin daily.  She does have a history of CAD, aortic stenosis status post TAVR, hyperlipidemia, hypertension.  Denies any chest pain or shortness of breath currently.           Nursing Notes were reviewed.    PAST MEDICAL HISTORY     Past Medical History:   Diagnosis Date    Aortic stenosis 11/15/2023    moderate    Benign neoplasm, unspecified site     Adenoma    Corns and callosities     Pre-ulcerative corn or callous    Hospital discharge follow-up 03/19/2024    Hyperlipidemia 02/23/2023    Hypertension 02/23/2023    Impingement syndrome of unspecified shoulder     Shoulder impingement syndrome    Mitral stenosis 11/15/2023    Personal history of other diseases of the nervous system and sense organs     History of carpal tunnel syndrome    Personal history of other diseases of urinary system     History of hematuria    Personal history of other endocrine, nutritional and metabolic disease     History of hypokalemia    Personal history of other endocrine, nutritional and metabolic disease     History of obesity    Personal history of other  endocrine, nutritional and metabolic disease     History of hyperlipidemia    Personal history of other endocrine, nutritional and metabolic disease     History of hypothyroidism    Personal history of other specified conditions     History of nausea    S/P TAVR (transcatheter aortic valve replacement) 2024    Shortness of breath 11/15/2023    Transient complete heart block (Multi) 2024    Unspecified tear of unspecified meniscus, current injury, left knee, initial encounter     Tear of cartilage of left knee         SURGICAL HISTORY       Past Surgical History:   Procedure Laterality Date    ANOMALOUS PULMONARY VENOUS RETURN REPAIR, TOTAL N/A 2024    Procedure: TAVR-OR;  Surgeon: Joce Kenyon MD;  Location: [address] Cardiac Cath Lab;  Service: Cardiac Surgery;  Laterality: N/A;    APPENDECTOMY  2018    Appendectomy    CARDIAC CATHETERIZATION N/A 2024    Procedure: Left And Right Heart Cath, Without LV;  Surgeon: Anam Garcia MD PhD;  Location: [facility] Cardiac Cath Lab;  Service: Cardiovascular;  Laterality: N/A;    CARDIAC CATHETERIZATION N/A 2024    Procedure: TAVR (Transcatheter AV Replacement);  Surgeon: Juan Carrasco MD;  Location: [address] Cardiac Cath Lab;  Service: Cardiovascular;  Laterality: N/A;  Medtr[employer] Evolut FX 29, schedule at 10am, Possible RESEARCH    CARDIAC CATHETERIZATION N/A 2024    Procedure: TVP for TAVR;  Surgeon: Juan Carrasco MD;  Location: [address] Cardiac Cath Lab;  Service: Cardiovascular;  Laterality: N/A;    CARDIAC CATHETERIZATION N/A 2024    Procedure: Left Heart Cath, No LV;  Surgeon: Juan Carrasco MD;  Location: [address] Cardiac Cath Lab;  Service: Cardiovascular;  Laterality: N/A;    KNEE SURGERY  2018    Knee Surgery    OTHER SURGICAL HISTORY  2018    Surgically Induced          CURRENT MEDICATIONS       Previous Medications    ACETAMINOPHEN (TYLENOL) 325 MG TABLET     Take by mouth every 6 hours. 2 tab(s) orally every 6 hours-continue routinely around the clock for next 72 hours then decrease to as needed for mild pain or temperature    AMOXICILLIN (AMOXIL) 500 MG CAPSULE    Take 4 caps (2000 mg) 30-60mins prior to your dental appointment    ASPIRIN 81 MG EC TABLET    Take 1 tablet (81 mg) by mouth once daily.    BUSPIRONE (BUSPAR) 10 MG TABLET    Take 1 tablet (10 mg) by mouth 2 times a day.    CHOLECALCIFEROL (VITAMIN D-3) 25 MCG (1000 UT) TABLET    Take 1 tablet (25 mcg) by mouth once daily.    FLUTICASONE (FLONASE) 50 MCG/ACTUATION NASAL SPRAY    Administer 1 spray into each nostril once daily.    HYDROCHLOROTHIAZIDE (HYDRODIURIL) 25 MG TABLET    Take 1 tablet (25 mg) by mouth once daily.    IPRATROPIUM (ATROVENT) 42 MCG (0.06 %) NASAL SPRAY    Administer 2 sprays into each nostril 3 times a day as needed for rhinitis.    LEVOTHYROXINE (SYNTHROID, LEVOXYL) 50 MCG TABLET    TAKE ONE TABLET DAILY MONDAY THROUGH FRIDAY AND 2 TABLETS ON SATURDAY AND SUNDAY    LOVASTATIN (MEVACOR) 40 MG TABLET    TAKE 1 TABLET EVERY DAY AT DINNER    OMEPRAZOLE (PRILOSEC) 20 MG DR CAPSULE    Take 1 capsule (20 mg) by mouth once daily.    POLYETHYLENE GLYCOL (GLYCOLAX) 17 GRAM/DOSE POWDER    Take 17 g by mouth twice a day. 17 gram(s) orally 2 times a day as needed for constipation    SERTRALINE (ZOLOFT) 100 MG TABLET    Take 1 tablet (100 mg) by mouth 2 times a day.       ALLERGIES     Prednisone, Aspirin, and Sulfa (sulfonamide antibiotics)    FAMILY HISTORY       Family History   Problem Relation Name Age of Onset    Tuberculosis Mother      Stroke Sister      Coronary artery disease Sister      Diabetes Brother            SOCIAL HISTORY       Social History     Socioeconomic History    Marital status:    Tobacco Use    Smoking status: Never     Passive exposure: Never    Smokeless tobacco: Never   Vaping Use    Vaping status: Never Used   Substance and Sexual Activity    Alcohol use: Yes      Comment: once in a while a little    Drug use: Never     Social Determinants of Health     Financial Resource Strain: Low Risk  (1/30/2024)    Overall Financial Resource Strain (CARDIA)     Difficulty of Paying Living Expenses: Not hard at all   Food Insecurity: No Food Insecurity (1/31/2024)    Hunger Vital Sign     Worried About Running Out of Food in the Last Year: Never true     Ran Out of Food in the Last Year: Never true   Transportation Needs: No Transportation Needs (1/30/2024)    PRAPARE - Transportation     Lack of Transportation (Medical): No     Lack of Transportation (Non-Medical): No   Stress: No Stress Concern Present (1/31/2024)    [employer] [place] [employer] - Occupational Stress Questionnaire     Feeling of Stress : Not at all   Intimate Partner Violence: Not At Risk (1/31/2024)    Humiliation, Afraid, Rape, and Kick questionnaire     Fear of Current or Ex-Partner: No     Emotionally Abused: No     Physically Abused: No     Sexually Abused: No   Housing Stability: Low Risk  (1/30/2024)    Housing Stability Vital Sign     Unable to Pay for Housing in the Last Year: No     Number of Places Lived in the Last Year: 1     Unstable Housing in the Last Year: No       SCREENINGS                        PHYSICAL EXAM    (up to 7 for level 4, 8 or more for level 5)     ED Triage Vitals   Temp Pulse Resp BP   -- -- -- --      SpO2 Temp src Heart Rate Source Patient Position   -- -- -- --      BP Location FiO2 (%)     -- --       Physical Exam  Vitals and nursing note reviewed.   Constitutional:       General: She is not in acute distress.     Appearance: Normal appearance. She is not ill-appearing or toxic-appearing.   HENT:      Head: Normocephalic and atraumatic.      Right Ear: External ear normal.      Left Ear: External ear normal.      Nose: Nose normal.   Eyes:      General: No visual field deficit.        Right eye: No discharge.         Left eye: No discharge.      Extraocular  Movements: Extraocular movements intact.      Conjunctiva/sclera: Conjunctivae normal.      Pupils: Pupils are equal, round, and reactive to light.   Cardiovascular:      Rate and Rhythm: Normal rate and regular rhythm.      Pulses: Normal pulses.      Heart sounds: Normal heart sounds.   Pulmonary:      Effort: Pulmonary effort is normal.      Breath sounds: Normal breath sounds.   Abdominal:      General: There is no distension.      Palpations: Abdomen is soft. There is no mass.      Tenderness: There is no abdominal tenderness. There is no guarding.   Musculoskeletal:         General: No deformity or signs of injury.   Skin:     General: Skin is warm and dry.   Neurological:      General: No focal deficit present.      Mental Status: She is alert and oriented to person, place, and time. Mental status is at baseline.      GCS: GCS eye subscore is 4. GCS verbal subscore is 5. GCS motor subscore is 6.      Cranial Nerves: Cranial nerves 2-12 are intact. No cranial nerve deficit, dysarthria or facial asymmetry.      Sensory: Sensation is intact.      Motor: Motor function is intact. No weakness.      Coordination: Coordination is intact.   Psychiatric:         Mood and Affect: Mood normal.         Behavior: Behavior normal.          DIAGNOSTIC RESULTS     LABS:  Labs Reviewed   CBC WITH AUTO DIFFERENTIAL - Abnormal       Result Value    WBC 3.4 (*)     nRBC 0.0      RBC 4.16      Hemoglobin 12.1      Hematocrit 38.2      MCV 92      MCH 29.1      MCHC 31.7 (*)     RDW 14.1      Platelets 148 (*)     Neutrophils % 41.6      Immature Granulocytes %, Automated 0.3      Lymphocytes % 42.8      Monocytes % 12.9      Eosinophils % 1.8      Basophils % 0.6      Neutrophils Absolute 1.42 (*)     Immature Granulocytes Absolute, Automated 0.01      Lymphocytes Absolute 1.46      Monocytes Absolute 0.44      Eosinophils Absolute 0.06      Basophils Absolute 0.02     COMPREHENSIVE METABOLIC PANEL - Abnormal    Glucose 105 (*)      Sodium 139      Potassium 3.6      Chloride 100      Bicarbonate 32      Anion Gap 11      Urea Nitrogen 24 (*)     Creatinine 0.80      eGFR 74      Calcium 9.5      Albumin 4.1      Alkaline Phosphatase 51      Total Protein 7.4      AST 20      Bilirubin, Total 0.6      ALT 12     TROPONIN I, HIGH SENSITIVITY - Abnormal    Troponin I, High Sensitivity 19 (*)     Narrative:     Less than 99th percentile of normal range cutoff-  Female and children under 18 years old <14 ng/L; Male <21 ng/L: Negative  Repeat testing should be performed if clinically indicated.     Female and children under 18 years old 14-50 ng/L; Male 21-50 ng/L:  Consistent with possible cardiac damage and possible increased clinical   risk. Serial measurements may help to assess extent of myocardial damage.     >50 ng/L: Consistent with cardiac damage, increased clinical risk and  myocardial infarction. Serial measurements may help assess extent of   myocardial damage.      NOTE: Children less than 1 year old may have higher baseline troponin   levels and results should be interpreted in conjunction with the overall   clinical context.     NOTE: Troponin I testing is performed using a different   testing methodology at [facility] than at other   [facility]. Direct result comparisons should only   be made within the same method.   URINALYSIS WITH REFLEX CULTURE AND MICROSCOPIC - Abnormal    Color, Urine Yellow      Appearance, Urine Turbid (*)     Specific Gravity, Urine 1.024      pH, Urine 6.5      Protein, Urine 10 (TRACE)      Glucose, Urine Normal      Blood, Urine NEGATIVE      Ketones, Urine NEGATIVE      Bilirubin, Urine NEGATIVE      Urobilinogen, Urine Normal      Nitrite, Urine 2+ (*)     Leukocyte Esterase, Urine 250 Marcela/µL (*)    MICROSCOPIC ONLY, URINE - Abnormal    WBC, Urine 21-50 (*)     RBC, Urine 1-2      Bacteria, Urine 1+ (*)     Mucus, Urine FEW      Hyaline Casts, Urine 1+ (*)    MAGNESIUM - Normal     Magnesium 1.94     B-TYPE NATRIURETIC PEPTIDE - Normal    BNP 96      Narrative:        <100 pg/mL - Heart failure unlikely  100-299 pg/mL - Intermediate probability of acute heart                  failure exacerbation. Correlate with clinical                  context and patient history.    >=300 pg/mL - Heart Failure likely. Correlate with clinical                  context and patient history.    BNP testing is performed using different testing methodology at [facility] than at other [facility]. Direct result comparisons should only be made within the same method.      SARS-COV-2 PCR - Normal    Coronavirus 2019, PCR Not Detected      Narrative:     This assay has received FDA Emergency Use Authorization (EUA) and is only authorized for the duration of time that circumstances exist to justify the authorization of the emergency use of in vitro diagnostic tests for the detection of SARS-CoV-2 virus and/or diagnosis of COVID-19 infection under section 564(b)(1) of the Act, 21 U.S.C. 360bbb-3(b)(1). This assay is an in vitro diagnostic nucleic acid amplification test for the qualitative detection of SARS-CoV-2 from nasopharyngeal specimens and has been validated for use at [facility]. Negative results do not preclude COVID-19 infections and should not be used as the sole basis for diagnosis, treatment, or other management decisions.     URINE CULTURE   URINALYSIS WITH REFLEX CULTURE AND MICROSCOPIC    Narrative:     The following orders were created for panel order Urinalysis with Reflex Culture and Microscopic.  Procedure                               Abnormality         Status                     ---------                               -----------         ------                     Urinalysis with Reflex C...[[id number]]  Abnormal            Final result               Extra Urine Gray Tube[[id number]]                            In process                   Please view  results for these tests on the individual orders.   EXTRA URINE GRAY TUBE   TROPONIN I, HIGH SENSITIVITY       All other labs were within normal range or not returned as of this dictation.    Imaging  CT head wo IV contrast   Final Result   No CT evidence of acute intracranial injury.                  MACRO:   None             Signed by: Tunde Valencia 8/27/2024 6:02 PM   Dictation workstation:   [id number]      CT cervical spine wo IV contrast   Final Result   Multilevel spondylosis without acute osseous abnormality of the   cervical spine.        MACRO:   None        Signed by: Tunde Valencia 8/27/2024 6:04 PM   Dictation workstation:   [id number]      XR foot right 3+ views   Final Result   Slightly displaced fracture of the first proximal phalanx.   Signed by Declan Perez MD      CT angio head and neck w and wo IV contrast    (Results Pending)        Procedures  Procedures     EMERGENCY DEPARTMENT COURSE/MDM:   Medical Decision Making  83-year-old female presenting today complaining of dizziness is resulting in a fall.  She states that she was standing up from a seated position when she began to feel unsteady on her feet.  She has not had any similar sensation of this in the past.  She fell backwards hit her head.  Also injured her big toe during the fall.  Her exam is overall reassuring while seated.  She denies any symptoms of dizziness or feeling off balance while seated.  Her neuroexam is intact.  No ataxia.  No sensory deficits.  Strength is equal bilaterally.  We will obtain x-rays of the foot to evaluate for acute bony injuries.  Also obtain usual syncope workup with CT head without contrast, CT C-spine without contrast, EKG, CBC, CMP, troponin, Magnesium, urinalysis, COVID test.         Please see ED course for additional MDM.    ED Course as of 08/27/24 2024 Tue Aug 27, 2024   1820 CT of the head is negative for acute intracranial injury.  CT C-spine negative for acute bony injuries.  There are  findings of multilevel spondylosis without acute osseous abnormality of the C-spine.  X-ray of the foot is notable for a slightly displaced fracture of the first proximal phalanx. [CL]   1845 EKG obtained interpreted by me.  Sinus rhythm.  Rate of 68.  Right bundle branch block.  No acute ischemia.  No STEMI. [MK]   2012 Rocephin was started for treatment of UTI. [CL]      ED Course User Index  [CL] Riki Ochoa DO  [MK] iTmothy Rhoades MD         Diagnoses as of 08/27/24 2024   Ataxia   Cystitis        CT head wo IV contrast   Final Result   No CT evidence of acute intracranial injury.                  MACRO:   None             Signed by: Tunde Valencia 8/27/2024 6:02 PM   Dictation workstation:   [id number]      CT cervical spine wo IV contrast   Final Result   Multilevel spondylosis without acute osseous abnormality of the   cervical spine.        MACRO:   None        Signed by: Tunde Valencia 8/27/2024 6:04 PM   Dictation workstation:   [id number]      XR foot right 3+ views   Final Result   Slightly displaced fracture of the first proximal phalanx.   Signed by Declan Perez MD      CT angio head and neck w and wo IV contrast    (Results Pending)       Patient and or family in agreement and understanding of treatment plan.  All questions answered.      I reviewed the case with the attending ED physician. The attending ED physician agrees with the plan. Patient and/or patient´s representative was counseled regarding labs, imaging, likely diagnosis, and plan. All questions were answered.    ED Medications administered this visit:    Medications   cefTRIAXone (Rocephin) 1 g in dextrose (iso) IV 50 mL (1 g intravenous New Bag 8/27/24 2021)       New Prescriptions from this visit:    New Prescriptions    No medications on file       Follow-up:  No follow-up provider specified.      Final Impression:   1. Ataxia    2. Cystitis          (Please note that portions of this note were completed with a voice  recognition program.  Efforts were made to edit the dictations but occasionally words are mis-transcribed.)     Riki Ochoa DO  Resident  08/27/24 2024

## 2024-08-28 ENCOUNTER — APPOINTMENT (OUTPATIENT)
Dept: RADIOLOGY | Facility: HOSPITAL | Age: 84
DRG: 690 | End: 2024-08-28
Payer: MEDICARE

## 2024-08-28 ENCOUNTER — APPOINTMENT (OUTPATIENT)
Dept: VASCULAR MEDICINE | Facility: HOSPITAL | Age: 84
End: 2024-08-28
Payer: MEDICARE

## 2024-08-28 LAB
ANION GAP SERPL CALC-SCNC: 14 MMOL/L (ref 10–20)
BUN SERPL-MCNC: 23 MG/DL (ref 6–23)
CALCIUM SERPL-MCNC: 9.1 MG/DL (ref 8.6–10.3)
CARDIAC TROPONIN I PNL SERPL HS: 17 NG/L (ref 0–13)
CHLORIDE SERPL-SCNC: 101 MMOL/L (ref 98–107)
CO2 SERPL-SCNC: 29 MMOL/L (ref 21–32)
CREAT SERPL-MCNC: 0.77 MG/DL (ref 0.5–1.05)
EGFRCR SERPLBLD CKD-EPI 2021: 78 ML/MIN/1.73M*2
ERYTHROCYTE [DISTWIDTH] IN BLOOD BY AUTOMATED COUNT: 14 % (ref 11.5–14.5)
GLUCOSE SERPL-MCNC: 105 MG/DL (ref 74–99)
HCT VFR BLD AUTO: 34 % (ref 36–46)
HGB BLD-MCNC: 11.6 G/DL (ref 12–16)
HOLD SPECIMEN: NORMAL
MCH RBC QN AUTO: 30.1 PG (ref 26–34)
MCHC RBC AUTO-ENTMCNC: 34.1 G/DL (ref 32–36)
MCV RBC AUTO: 88 FL (ref 80–100)
NRBC BLD-RTO: 0 /100 WBCS (ref 0–0)
PLATELET # BLD AUTO: 148 X10*3/UL (ref 150–450)
POTASSIUM SERPL-SCNC: 3.5 MMOL/L (ref 3.5–5.3)
RBC # BLD AUTO: 3.86 X10*6/UL (ref 4–5.2)
SODIUM SERPL-SCNC: 140 MMOL/L (ref 136–145)
WBC # BLD AUTO: 4.2 X10*3/UL (ref 4.4–11.3)

## 2024-08-28 PROCEDURE — 99223 1ST HOSP IP/OBS HIGH 75: CPT | Performed by: STUDENT IN AN ORGANIZED HEALTH CARE EDUCATION/TRAINING PROGRAM

## 2024-08-28 PROCEDURE — 97161 PT EVAL LOW COMPLEX 20 MIN: CPT | Mod: GP

## 2024-08-28 PROCEDURE — 93880 EXTRACRANIAL BILAT STUDY: CPT

## 2024-08-28 PROCEDURE — 96372 THER/PROPH/DIAG INJ SC/IM: CPT | Performed by: NURSE PRACTITIONER

## 2024-08-28 PROCEDURE — 2500000004 HC RX 250 GENERAL PHARMACY W/ HCPCS (ALT 636 FOR OP/ED): Performed by: NURSE PRACTITIONER

## 2024-08-28 PROCEDURE — 2500000002 HC RX 250 W HCPCS SELF ADMINISTERED DRUGS (ALT 637 FOR MEDICARE OP, ALT 636 FOR OP/ED): Performed by: PHYSICIAN ASSISTANT

## 2024-08-28 PROCEDURE — 70551 MRI BRAIN STEM W/O DYE: CPT | Performed by: RADIOLOGY

## 2024-08-28 PROCEDURE — 2500000001 HC RX 250 WO HCPCS SELF ADMINISTERED DRUGS (ALT 637 FOR MEDICARE OP): Performed by: PHYSICIAN ASSISTANT

## 2024-08-28 PROCEDURE — 85027 COMPLETE CBC AUTOMATED: CPT | Performed by: NURSE PRACTITIONER

## 2024-08-28 PROCEDURE — G0378 HOSPITAL OBSERVATION PER HR: HCPCS

## 2024-08-28 PROCEDURE — 93880 EXTRACRANIAL BILAT STUDY: CPT | Performed by: SURGERY

## 2024-08-28 PROCEDURE — 97165 OT EVAL LOW COMPLEX 30 MIN: CPT | Mod: GO

## 2024-08-28 PROCEDURE — 36415 COLL VENOUS BLD VENIPUNCTURE: CPT | Performed by: NURSE PRACTITIONER

## 2024-08-28 PROCEDURE — 70551 MRI BRAIN STEM W/O DYE: CPT

## 2024-08-28 PROCEDURE — 82374 ASSAY BLOOD CARBON DIOXIDE: CPT | Performed by: NURSE PRACTITIONER

## 2024-08-28 PROCEDURE — 84484 ASSAY OF TROPONIN QUANT: CPT | Performed by: NURSE PRACTITIONER

## 2024-08-28 RX ORDER — POLYETHYLENE GLYCOL 3350 17 G/17G
17 POWDER, FOR SOLUTION ORAL DAILY PRN
Status: DISCONTINUED | OUTPATIENT
Start: 2024-08-28 | End: 2024-09-01 | Stop reason: HOSPADM

## 2024-08-28 RX ORDER — HYDROCHLOROTHIAZIDE 25 MG/1
25 TABLET ORAL DAILY
Status: DISCONTINUED | OUTPATIENT
Start: 2024-08-28 | End: 2024-09-01 | Stop reason: HOSPADM

## 2024-08-28 RX ORDER — ASPIRIN 81 MG/1
81 TABLET ORAL DAILY
Status: DISCONTINUED | OUTPATIENT
Start: 2024-08-28 | End: 2024-09-01 | Stop reason: HOSPADM

## 2024-08-28 RX ORDER — FLUTICASONE PROPIONATE 50 MCG
1 SPRAY, SUSPENSION (ML) NASAL DAILY PRN
Status: DISCONTINUED | OUTPATIENT
Start: 2024-08-28 | End: 2024-09-01 | Stop reason: HOSPADM

## 2024-08-28 RX ORDER — ATORVASTATIN CALCIUM 10 MG/1
10 TABLET, FILM COATED ORAL NIGHTLY
Status: DISCONTINUED | OUTPATIENT
Start: 2024-08-28 | End: 2024-09-01 | Stop reason: HOSPADM

## 2024-08-28 RX ORDER — LEVOTHYROXINE SODIUM 100 UG/1
100 TABLET ORAL
Status: DISCONTINUED | OUTPATIENT
Start: 2024-08-31 | End: 2024-09-01 | Stop reason: HOSPADM

## 2024-08-28 RX ORDER — LEVOTHYROXINE SODIUM 50 UG/1
50 TABLET ORAL
Status: DISCONTINUED | OUTPATIENT
Start: 2024-08-29 | End: 2024-09-01 | Stop reason: HOSPADM

## 2024-08-28 RX ORDER — BUSPIRONE HYDROCHLORIDE 10 MG/1
10 TABLET ORAL 2 TIMES DAILY
Status: DISCONTINUED | OUTPATIENT
Start: 2024-08-28 | End: 2024-09-01 | Stop reason: HOSPADM

## 2024-08-28 RX ORDER — CHOLECALCIFEROL (VITAMIN D3) 25 MCG
1000 TABLET ORAL DAILY
Status: DISCONTINUED | OUTPATIENT
Start: 2024-08-28 | End: 2024-09-01 | Stop reason: HOSPADM

## 2024-08-28 RX ORDER — SERTRALINE HYDROCHLORIDE 100 MG/1
100 TABLET, FILM COATED ORAL NIGHTLY
Status: DISCONTINUED | OUTPATIENT
Start: 2024-08-28 | End: 2024-09-01 | Stop reason: HOSPADM

## 2024-08-28 RX ORDER — PANTOPRAZOLE SODIUM 40 MG/1
40 TABLET, DELAYED RELEASE ORAL
Status: DISCONTINUED | OUTPATIENT
Start: 2024-08-29 | End: 2024-09-01 | Stop reason: HOSPADM

## 2024-08-28 SDOH — SOCIAL STABILITY: SOCIAL INSECURITY: DO YOU FEEL UNSAFE GOING BACK TO THE PLACE WHERE YOU ARE LIVING?: NO

## 2024-08-28 SDOH — SOCIAL STABILITY: SOCIAL INSECURITY: HAVE YOU HAD ANY THOUGHTS OF HARMING ANYONE ELSE?: NO

## 2024-08-28 SDOH — SOCIAL STABILITY: SOCIAL INSECURITY: ARE YOU OR HAVE YOU BEEN THREATENED OR ABUSED PHYSICALLY, EMOTIONALLY, OR SEXUALLY BY ANYONE?: NO

## 2024-08-28 SDOH — SOCIAL STABILITY: SOCIAL INSECURITY: DOES ANYONE TRY TO KEEP YOU FROM HAVING/CONTACTING OTHER FRIENDS OR DOING THINGS OUTSIDE YOUR HOME?: NO

## 2024-08-28 SDOH — SOCIAL STABILITY: SOCIAL INSECURITY: ARE THERE ANY APPARENT SIGNS OF INJURIES/BEHAVIORS THAT COULD BE RELATED TO ABUSE/NEGLECT?: NO

## 2024-08-28 SDOH — SOCIAL STABILITY: SOCIAL INSECURITY: ABUSE: ADULT

## 2024-08-28 SDOH — SOCIAL STABILITY: SOCIAL INSECURITY: HAVE YOU HAD THOUGHTS OF HARMING ANYONE ELSE?: NO

## 2024-08-28 SDOH — SOCIAL STABILITY: SOCIAL INSECURITY: DO YOU FEEL ANYONE HAS EXPLOITED OR TAKEN ADVANTAGE OF YOU FINANCIALLY OR OF YOUR PERSONAL PROPERTY?: NO

## 2024-08-28 SDOH — SOCIAL STABILITY: SOCIAL INSECURITY: WERE YOU ABLE TO COMPLETE ALL THE BEHAVIORAL HEALTH SCREENINGS?: YES

## 2024-08-28 SDOH — SOCIAL STABILITY: SOCIAL INSECURITY: HAS ANYONE EVER THREATENED TO HURT YOUR FAMILY OR YOUR PETS?: NO

## 2024-08-28 ASSESSMENT — ACTIVITIES OF DAILY LIVING (ADL)
JUDGMENT_ADEQUATE_SAFELY_COMPLETE_DAILY_ACTIVITIES: YES
WALKS IN HOME: NEEDS ASSISTANCE
DRESSING YOURSELF: NEEDS ASSISTANCE
HEARING - RIGHT EAR: FUNCTIONAL
BATHING: NEEDS ASSISTANCE
FEEDING YOURSELF: INDEPENDENT
ADEQUATE_TO_COMPLETE_ADL: YES
HEARING - LEFT EAR: FUNCTIONAL
TOILETING: NEEDS ASSISTANCE
LACK_OF_TRANSPORTATION: NO
GROOMING: INDEPENDENT
PATIENT'S MEMORY ADEQUATE TO SAFELY COMPLETE DAILY ACTIVITIES?: YES

## 2024-08-28 ASSESSMENT — PATIENT HEALTH QUESTIONNAIRE - PHQ9
2. FEELING DOWN, DEPRESSED OR HOPELESS: NOT AT ALL
1. LITTLE INTEREST OR PLEASURE IN DOING THINGS: NOT AT ALL
SUM OF ALL RESPONSES TO PHQ9 QUESTIONS 1 & 2: 0

## 2024-08-28 ASSESSMENT — COGNITIVE AND FUNCTIONAL STATUS - GENERAL
HELP NEEDED FOR BATHING: A LITTLE
HELP NEEDED FOR BATHING: A LOT
CLIMB 3 TO 5 STEPS WITH RAILING: A LOT
MOVING TO AND FROM BED TO CHAIR: A LITTLE
DAILY ACTIVITIY SCORE: 15
TURNING FROM BACK TO SIDE WHILE IN FLAT BAD: A LITTLE
WALKING IN HOSPITAL ROOM: A LOT
PATIENT BASELINE BEDBOUND: NO
MOBILITY SCORE: 13
MOVING TO AND FROM BED TO CHAIR: A LITTLE
STANDING UP FROM CHAIR USING ARMS: A LITTLE
WALKING IN HOSPITAL ROOM: A LOT
MOBILITY SCORE: 17
STANDING UP FROM CHAIR USING ARMS: A LOT
DAILY ACTIVITIY SCORE: 21
DAILY ACTIVITIY SCORE: 21
MOVING FROM LYING ON BACK TO SITTING ON SIDE OF FLAT BED WITH BEDRAILS: A LITTLE
DRESSING REGULAR LOWER BODY CLOTHING: TOTAL
MOBILITY SCORE: 17
TURNING FROM BACK TO SIDE WHILE IN FLAT BAD: A LITTLE
CLIMB 3 TO 5 STEPS WITH RAILING: A LOT
WALKING IN HOSPITAL ROOM: A LOT
TURNING FROM BACK TO SIDE WHILE IN FLAT BAD: A LOT
TOILETING: A LOT
HELP NEEDED FOR BATHING: A LITTLE
DRESSING REGULAR LOWER BODY CLOTHING: A LITTLE
TOILETING: A LITTLE
STANDING UP FROM CHAIR USING ARMS: A LITTLE
TOILETING: A LITTLE
PERSONAL GROOMING: A LITTLE
CLIMB 3 TO 5 STEPS WITH RAILING: A LOT
DRESSING REGULAR LOWER BODY CLOTHING: A LITTLE
MOVING TO AND FROM BED TO CHAIR: A LOT
DRESSING REGULAR UPPER BODY CLOTHING: A LITTLE

## 2024-08-28 ASSESSMENT — LIFESTYLE VARIABLES
HOW OFTEN DO YOU HAVE 6 OR MORE DRINKS ON ONE OCCASION: NEVER
AUDIT-C TOTAL SCORE: 0
HOW MANY STANDARD DRINKS CONTAINING ALCOHOL DO YOU HAVE ON A TYPICAL DAY: PATIENT DOES NOT DRINK
SKIP TO QUESTIONS 9-10: 1
HOW OFTEN DO YOU HAVE A DRINK CONTAINING ALCOHOL: NEVER
SUBSTANCE_ABUSE_PAST_12_MONTHS: NO
AUDIT-C TOTAL SCORE: 0
PRESCIPTION_ABUSE_PAST_12_MONTHS: NO

## 2024-08-28 ASSESSMENT — COLUMBIA-SUICIDE SEVERITY RATING SCALE - C-SSRS
6. HAVE YOU EVER DONE ANYTHING, STARTED TO DO ANYTHING, OR PREPARED TO DO ANYTHING TO END YOUR LIFE?: NO
1. SINCE LAST CONTACT, HAVE YOU WISHED YOU WERE DEAD OR WISHED YOU COULD GO TO SLEEP AND NOT WAKE UP?: NO
6. HAVE YOU EVER DONE ANYTHING, STARTED TO DO ANYTHING, OR PREPARED TO DO ANYTHING TO END YOUR LIFE?: NO
2. HAVE YOU ACTUALLY HAD ANY THOUGHTS OF KILLING YOURSELF?: NO
2. HAVE YOU ACTUALLY HAD ANY THOUGHTS OF KILLING YOURSELF?: NO
1. IN THE PAST MONTH, HAVE YOU WISHED YOU WERE DEAD OR WISHED YOU COULD GO TO SLEEP AND NOT WAKE UP?: NO

## 2024-08-28 ASSESSMENT — PAIN SCALES - GENERAL
PAINLEVEL_OUTOF10: 0 - NO PAIN
PAINLEVEL_OUTOF10: 0 - NO PAIN

## 2024-08-28 NOTE — PROGRESS NOTES
Pharmacy Medication History Review    Ayo Bueno is a 83 y.o. female admitted for Dizziness. Pharmacy reviewed the patient's [id number] medications and allergies for accuracy.    The list below reflectives the updated PTA list. Please review each medication in order reconciliation for additional clarification and justification.  Prior to Admission medications    Medication Sig Start Date End Date Authorizing Provider   acetaminophen (Tylenol) 325 mg tablet Take by mouth every 6 hours. 2 tab(s) orally every 6 hours-continue routinely around the clock for next 72 hours then decrease to as needed for mild pain or temperature   Historical Provider, MD   aspirin 81 mg EC tablet Take 1 tablet (81 mg) by mouth once daily.   Historical Provider, MD   busPIRone (Buspar) 10 mg tablet Take 1 tablet (10 mg) by mouth 2 times a day.   Jolie Franks MD   cholecalciferol (Vitamin D-3) 25 MCG (1000 UT) tablet Take 1 tablet (25 mcg) by mouth once daily.   Historical Provider, MD   fluticasone (Flonase) 50 mcg/actuation nasal spray Administer 1 spray into each nostril once daily as needed.   Historical Provider, MD   hydroCHLOROthiazide (HYDRODiuril) 25 mg tablet Take 1 tablet (25 mg) by mouth once daily.   Timothy Bardales,    levothyroxine (Synthroid, Levoxyl) 50 mcg tablet TAKE ONE TABLET DAILY MONDAY THROUGH FRIDAY AND 2 TABLETS ON SATURDAY AND SUNDAY   Timothy Bardales DO   lovastatin (Mevacor) 40 mg tablet TAKE 1 TABLET EVERY DAY AT DINNER   Timothy Bardales DO   omeprazole (PriLOSEC) 20 mg DR capsule Take 1 capsule (20 mg) by mouth once daily.   Timothy Bardales,    polyethylene glycol (Glycolax) 17 gram/dose powder Take 17 g by mouth once daily as needed.   Historical Provider, MD   sertraline (Zoloft) 100 mg tablet Take 1 tablet (100 mg) by mouth once daily at bedtime.   Historical Provider, MD        The list below reflectives the updated allergy list. Please review each documented allergy  for additional clarification and justification.  Allergies  Reviewed by Declan Griffin, APRN-PITER on 8/27/2024        Severity Reactions Comments    Prednisone Not Specified Unknown     Aspirin Low GI Upset Takes low dose Aspirin daily    Sulfa (sulfonamide Antibiotics) Low Rash             Below are additional concerns with the patient's PTA list.      Barbra Bryant

## 2024-08-28 NOTE — PROGRESS NOTES
Physical Therapy    Physical Therapy Evaluation    Patient Name: Ayo Bueno  MRN: [id number]  Today's Date: 8/28/2024   Time Calculation  Start Time: 0846  Stop Time: 0858  Time Calculation (min): 12 min  AC22/AC22    Assessment/Plan   PT Assessment  PT Assessment Results: Decreased mobility, Pain  End of Session Communication: Bedside nurse  End of Session Patient Position: Alarm off, not on at start of session, Bed, 2 rail up, On cart (All needs in reach and no complaints noted)  IP OR SWING BED PT PLAN  Inpatient or Swing Bed: Inpatient  PT Plan  Treatment/Interventions: Bed mobility, Transfer training, Gait training  PT Plan: Ongoing PT  PT Frequency: 3 times per week  PT Discharge Recommendations: Moderate intensity level of continued care  PT - OK to Discharge: Yes    Subjective     Current Problem:  1. Ataxia        2. Cystitis        3. Dizziness  Carotid duplex bilateral    Carotid duplex bilateral      4. Vestibular neuronitis, unspecified ear  Carotid duplex bilateral        Patient Active Problem List   Diagnosis    Ataxia    Cervical myelopathy (Multi)    Hyperlipidemia    Hypertension    Hypothyroidism    Hypovitaminosis D    IFG (impaired fasting glucose)    Unsteady gait    VHD (valvular heart disease)    Mitral stenosis    Shortness of breath    S/P TAVR (transcatheter aortic valve replacement)    Transient complete heart block (Multi)    AV block, Mobitz II    Hospital discharge follow-up    Dizziness     General Visit Information:  General  Reason for Referral: PT Eval and Treat  Referred By: ANATOLIY Euceda  Past Medical History Relevant to Rehab: 83 y.o. female presenting to the emergency department for evaluation of a fall.  Patient states she stood up and felt dizzy, lost her balance and fell backwards hitting her head.  Patient denies loss of consciousness.  Patient states she is not on anticoagulation but states that she does take a baby aspirin daily.  Patient believes  she may have gotten up too quickly, states she was in her normal state of health prior to the fall.  Co-Treatment: OT  Co-Treatment Reason: co-eval to maximize safety when assessing mobility  Prior to Session Communication: Bedside nurse  Patient Position Received: Alarm off, not on at start of session, Bed, 2 rail up, On cart (Agreeable to PT)  General Comment: Pt is [place], however is able to communicate some in English    Home Living:  Home Living  Home Living Comments: Pt lives alone in a split level house with 0 ELDER and 7 steps to each level with HR.    Prior Level of Function:  Prior Function Per Pt/Caregiver Report  Level of [place]: Independent with ADLs and functional transfers (Pt amb intermittently with RW, owns SPC, daughter assists with IADLs and driving, visits patient 1-2x/day)    Precautions:  Precautions  Precautions Comment: right post-op shoe due to great toe fracture, fall precautions    Objective     Pain:  Pain Assessment  Pain Assessment:  (intermittent L shoulder, B LE, and R great toe pain, not quantified, repositioned for comfort s/p session)    Cognition:  Cognition  Overall Cognitive Status: Within Functional Limits (some language barrier (primary [place]an) however comprehends one-step/simple commands)    General Assessments:  Sensation  Light Touch:  (intermittent numbness/tingling B fingers)  Strength  Strength Comments: B LE ROM WFL, B LE strength 4-/5, R foot/ankle not assessed 2/2 R great toe fx  Dynamic Standing Balance  Dynamic Standing-Comments: Poot using RW with mod A x 1    Functional Assessments:   Bed Mobility  Bed Mobility:  (supine <> sitting: mod A x 2)  Transfers  Transfer:  (STS from high ED cart: min A x 1)  Ambulation/Gait Training  Ambulation/Gait Training Performed:  (Pt was able to side step x 3' using RW with mod A x 1 with surgical shoe donned, pt demonstrated difficulty WBing through R LE due to pain)     Outcome Measures:  [facility] Basic Mobility  Turning from  your back to your side while in a flat bed without using bedrails: A little  Moving from lying on your back to sitting on the side of a flat bed without using bedrails: A lot  Moving to and from bed to chair (including a wheelchair): A lot  Standing up from a chair using your arms (e.g. wheelchair or bedside chair): A lot  To walk in hospital room: A lot  Climbing 3-5 steps with railing: A lot  Basic Mobility - Total Score: 13    Goals:  Encounter Problems       Encounter Problems (Active)       PT Problem       STG - Pt will transition supine <> sitting with CGA (Progressing)       Start:  08/28/24    Expected End:  09/11/24            STG - Pt will transfer STS with CGA  (Progressing)       Start:  08/28/24    Expected End:  09/11/24            STG - Pt will amb 30' using RW with CGA  (Progressing)       Start:  08/28/24    Expected End:  09/11/24                 Education Documentation  Mobility Training, taught by Carolyn Berry PT at 8/28/2024  1:06 PM.  Learner: Patient  Readiness: Acceptance  Method: Explanation  Response: Verbalizes Understanding    Education Comments  No comments found.

## 2024-08-28 NOTE — H&P
History Of Present Illness  Ayo Bueno is a 83 y.o. female presenting to the emergency department for evaluation of a fall.  Patient states she stood up and felt dizzy, lost her balance and fell backwards hitting her head.  Patient denies loss of consciousness.  Patient states she is not on anticoagulation but states that she does take a baby aspirin daily.  Patient believes she may have gotten up too quickly, states she was in her normal state of health prior to the fall.  Patient denies chest pain or shortness of breath.  Patient denies recent illness, nausea, vomiting, diarrhea.  Patient does endorse pain in her right great toe from the fall. A combination of HILDA and google translate were used in this discussion.     In ED, patient was found to have a UTI, urine cultures pending, started on IV ceftriaxone.  WBCs 3.4, platelets 148, glucose 105, BUN 24, troponin 19 with a repeat of 19.  Magnesium and BNP within normal limits.  Blood pressure 165/70, heart rate 64, respirations 12, temperature 36.9 °C, SpO2 99% on room air.  CT of the head completed and negative for acute process, CT of the C-spine completed and negative for acute process per radiology review.  X-ray of the right foot showing a mildly displaced fracture of the first phalanx per radiology review.  Postop shoe ordered.  CT angio head and neck ordered and pending.  EKG completed showing sinus rhythm at a rate of 68 with a right bundle branch block as read per ER physician review.  Patient given IV fluids.  Patient admitted to telemetry observation under the care of Dr. Batitsa who will continue to follow.  I was asked to do H&P and place initial admission orders.     *Previous medical records reviewed.    Past Medical History  Aortic stenosis, adenoma, hypertension, hyperlipidemia, GERD, hypothyroidism, depression  Surgical History  TAVR, cardiac catheterization, appendectomy, hip replacement, knee surgery       Social History  Never smoker,  no drug use, no alcohol use  Family History  TB, stroke, CAD, diabetes       Allergies  Prednisone, Aspirin, and Sulfa (sulfonamide antibiotics)    Review of Systems  A 10 point review of systems was completed and negative except as listed in HPI  Physical Exam  HENT:      Mouth/Throat:      Mouth: Mucous membranes are dry.      Pharynx: Oropharynx is clear.   Eyes:      Pupils: Pupils are equal, round, and reactive to light.   Cardiovascular:      Rate and Rhythm: Normal rate and regular rhythm.   Pulmonary:      Effort: Pulmonary effort is normal.      Breath sounds: Normal breath sounds.   Abdominal:      General: Bowel sounds are normal.      Palpations: Abdomen is soft.   Musculoskeletal:      Cervical back: Normal range of motion.      Comments: Right great toe tenderness   Skin:     General: Skin is warm and dry.      Capillary Refill: Capillary refill takes less than 2 seconds.   Neurological:      General: No focal deficit present.      Mental Status: She is alert.   Psychiatric:         Mood and Affect: Mood normal.         Behavior: Behavior normal.          Last Recorded Vitals  Blood pressure 165/70, pulse 64, resp. rate 13, height 1.524 m (5'), weight 76.2 kg (168 lb), SpO2 97%.    Relevant Results  XR chest 1 view    Result Date: 8/27/2024  Interpreted By:  Zakia Foster, STUDY: Chest, single AP view.   INDICATION: Signs/Symptoms:syncope.   COMPARISON: 01/31/2024   ACCESSION NUMBER(S): [id number]   ORDERING CLINICIAN: MASOOD PETERS   FINDINGS: The cardiac silhouette size is within normal limits. There is no focal consolidation, edema or pneumothorax. No sizeable pleural effusion. No acute osseous abnormality.       1. No acute cardiopulmonary process.   MACRO: None.   Signed by: Zakia Foster 8/27/2024 9:27 PM Dictation workstation:   [id number]    CT cervical spine wo IV contrast    Result Date: 8/27/2024  Interpreted By:  Tunde Valencia, STUDY: CT CERVICAL SPINE WO IV CONTRAST;   8/27/2024 5:42 pm   INDICATION: Signs/Symptoms:Fall over 67.     COMPARISON: None.   ACCESSION NUMBER(S): [id number]   ORDERING CLINICIAN: GUILLERMINA CELAYA   TECHNIQUE: Axial CT images of the cervical spine are obtained. Axial, coronal and sagittal reconstructions are provided for review.   FINDINGS:     Fractures: There is no evidence for an acute fracture of the cervical spine.   Vertebral Alignment: No posttraumatic malalignment. There is overall straightening of the normal cervical lordosis, presumed secondary to patient positioning or spasm.   Craniocervical Junction: The odontoid process and craniocervical junction are intact.   Vertebrae/Disc Spaces:  Multilevel spondylosis. Multilevel disc space narrowing. Multilevel facet arthropathy Multilevel uncovertebral hypertrophy.Multilevel osteophyte formation.   Prevertebral/Paraspinal Soft Tissues: The prevertebral and paraspinal soft tissues are unremarkable.         Multilevel spondylosis without acute osseous abnormality of the cervical spine.   MACRO: None   Signed by: Tunde Valencia 8/27/2024 6:04 PM Dictation workstation:   [id number]    CT head wo IV contrast    Result Date: 8/27/2024  Interpreted By:  Tunde Valencia, STUDY: CT HEAD WO IV CONTRAST;  8/27/2024 5:43 pm   INDICATION: Signs/Symptoms:Fall over 67.   COMPARISON: Head CT 05/28/2009   ACCESSION NUMBER(S): [id number]   ORDERING CLINICIAN: GUILLERMINA CELAYA   TECHNIQUE: Noncontrast axial CT scan of head was performed. Angled reformats in brain and bone windows were generated. The images were reviewed in bone, brain, blood and soft tissue windows.   FINDINGS: CSF Spaces: The ventricles, sulci and basal cisterns are within normal limits. There is no extraaxial fluid collection.   Parenchyma: Mild to moderate parenchymal atrophy the grey-white differentiation is intact. There is no mass effect or midline shift. There is no intracranial hemorrhage. There is interval increased calcification of the right  cerebellum which is progressive since prior comparison imaging from 05/20 8/9   Calvarium: No acute displaced calvarial fracture.   Paranasal sinuses and mastoids: Visualized paranasal sinuses and mastoids are clear.       No CT evidence of acute intracranial injury.       MACRO: None     Signed by: Tunde Valencia 8/27/2024 6:02 PM Dictation workstation:   [id number]    XR foot right 3+ views    Result Date: 8/27/2024  STUDY: Foot Radiographs; 08/27/2024 5:24PM INDICATION: Right foot pain post fall. COMPARISON: None Available. ACCESSION NUMBER(S): [id number] ORDERING CLINICIAN: GUILLERMINA CELAYA TECHNIQUE:  Three view(s) of the right foot. FINDINGS:  There is a slightly displaced fracture of the first proximal phalanx. The alignment is anatomic.  No soft tissue abnormality is seen.    Results for orders placed or performed during the hospital encounter of 08/27/24 (from the past 24 hour(s))   CBC and Auto Differential   Result Value Ref Range    WBC 3.4 (L) 4.4 - 11.3 x10*3/uL    nRBC 0.0 0.0 - 0.0 /100 WBCs    RBC 4.16 4.00 - 5.20 x10*6/uL    Hemoglobin 12.1 12.0 - 16.0 g/dL    Hematocrit 38.2 36.0 - 46.0 %    MCV 92 80 - 100 fL    MCH 29.1 26.0 - 34.0 pg    MCHC 31.7 (L) 32.0 - 36.0 g/dL    RDW 14.1 11.5 - 14.5 %    Platelets 148 (L) 150 - 450 x10*3/uL    Neutrophils % 41.6 40.0 - 80.0 %    Immature Granulocytes %, Automated 0.3 0.0 - 0.9 %    Lymphocytes % 42.8 13.0 - 44.0 %    Monocytes % 12.9 2.0 - 10.0 %    Eosinophils % 1.8 0.0 - 6.0 %    Basophils % 0.6 0.0 - 2.0 %    Neutrophils Absolute 1.42 (L) 1.60 - 5.50 x10*3/uL    Immature Granulocytes Absolute, Automated 0.01 0.00 - 0.50 x10*3/uL    Lymphocytes Absolute 1.46 0.80 - 3.00 x10*3/uL    Monocytes Absolute 0.44 0.05 - 0.80 x10*3/uL    Eosinophils Absolute 0.06 0.00 - 0.40 x10*3/uL    Basophils Absolute 0.02 0.00 - 0.10 x10*3/uL   Comprehensive metabolic panel   Result Value Ref Range    Glucose 105 (H) 74 - 99 mg/dL    Sodium 139 136 - 145 mmol/L     Potassium 3.6 3.5 - 5.3 mmol/L    Chloride 100 98 - 107 mmol/L    Bicarbonate 32 21 - 32 mmol/L    Anion Gap 11 10 - 20 mmol/L    Urea Nitrogen 24 (H) 6 - 23 mg/dL    Creatinine 0.80 0.50 - 1.05 mg/dL    eGFR 74 >60 mL/min/1.73m*2    Calcium 9.5 8.6 - 10.3 mg/dL    Albumin 4.1 3.4 - 5.0 g/dL    Alkaline Phosphatase 51 33 - 136 U/L    Total Protein 7.4 6.4 - 8.2 g/dL    AST 20 9 - 39 U/L    Bilirubin, Total 0.6 0.0 - 1.2 mg/dL    ALT 12 7 - 45 U/L   Magnesium   Result Value Ref Range    Magnesium 1.94 1.60 - 2.40 mg/dL   Troponin I, High Sensitivity   Result Value Ref Range    Troponin I, High Sensitivity 19 (H) 0 - 13 ng/L   B-Type Natriuretic Peptide   Result Value Ref Range    BNP 96 0 - 99 pg/mL   Sars-CoV-2 PCR   Result Value Ref Range    Coronavirus 2019, PCR Not Detected Not Detected   Urinalysis with Reflex Culture and Microscopic   Result Value Ref Range    Color, Urine Yellow Light-Yellow, Yellow, Dark-Yellow    Appearance, Urine Turbid (N) Clear    Specific Gravity, Urine 1.024 1.005 - 1.035    pH, Urine 6.5 5.0, 5.5, 6.0, 6.5, 7.0, 7.5, 8.0    Protein, Urine 10 (TRACE) NEGATIVE, 10 (TRACE), 20 (TRACE) mg/dL    Glucose, Urine Normal Normal mg/dL    Blood, Urine NEGATIVE NEGATIVE    Ketones, Urine NEGATIVE NEGATIVE mg/dL    Bilirubin, Urine NEGATIVE NEGATIVE    Urobilinogen, Urine Normal Normal mg/dL    Nitrite, Urine 2+ (A) NEGATIVE    Leukocyte Esterase, Urine 250 Marcela/µL (A) NEGATIVE   Microscopic Only, Urine   Result Value Ref Range    WBC, Urine 21-50 (A) 1-5, NONE /HPF    RBC, Urine 1-2 NONE, 1-2, 3-5 /HPF    Bacteria, Urine 1+ (A) NONE SEEN /HPF    Mucus, Urine FEW Reference range not established. /LPF    Hyaline Casts, Urine 1+ (A) NONE /LPF   Troponin I, High Sensitivity   Result Value Ref Range    Troponin I, High Sensitivity 19 (H) 0 - 13 ng/L       Assessment/Plan   Kostadinka is an 83-year-old Afghan speaking female presenting to emergency department for evaluation of a fall.  Patient states  that she stood up and felt dizzy and then fell backwards hitting her head, no loss of consciousness.  No anticoagulation however she does take a baby aspirin daily.  CT of the head/C-spine negative for acute process.  Patient complaining of right great toe pain, x-rays showing a mildly displaced first phalanx fracture.  Postop shoe applied.  Patient found to have a UTI, urine cultures pending, started on IV ceftriaxone and IV fluids.  CT angio of the head and neck ordered and pending.  Mildly elevated troponin of 19 with a repeat of 19, will continue to trend.  Patient admitted to telemetry observation for further medical management.    Dizziness/acute cystitis/elevated troponin/right great toe fracture  Admit to telemetry observation per Dr. Batista  See imaging results above  CT angio head and neck pending  Telemetry monitoring  Urine cultures pending  Continue IV ceftriaxone every 24 hours  IV Zofran as needed  Tylenol as needed  Continue IV fluids  Trend troponin  PT/OT  Postop shoe ordered  Repeat labs in a.m.    GERD/hypothyroidism/depression/hypertension/CAD/aortic stenosis/hyperlipidemia  #Chronic conditions  Telemetry monitoring  Cardiac diet  Nursing to complete home med rec    DVT Ppx  SCDs  Heparin subcutaneous  Out of bed with assistance      I spent 30 minutes in the professional and overall care of this patient.  Peace Swann, APRN-CNP

## 2024-08-28 NOTE — PROGRESS NOTES
Occupational Therapy    Evaluation    Patient Name: Ayo Bueno  MRN: [id number]  Today's Date: 8/28/2024  Time Calculation  Start Time: 0845  Stop Time: 0858  Time Calculation (min): 13 min  AC22/AC22    Assessment  IP OT Assessment  OT Assessment: This hospitalization 8/27/2024 due to fall, dizziness; hitting head.  Per medical team assessment:  acute cystitis/elevated troponin/right great toe fracture.  Patient would benefit from further OT to address ADL's and functional transfers/mobility due to high risk for further falls and signficant decline in baseline function.  Prognosis: Good  End of Session Communication: Bedside nurse  End of Session Patient Position: Alarm off, not on at start of session (cart, 2 rail up); call-light within reach    Plan:  Treatment Interventions: ADL retraining, Functional transfer training, Patient/family training, Equipment evaluation/education, Compensatory technique education, Endurance training  OT Frequency: 3 times per week  OT Discharge Recommendations: Moderate intensity level of continued care  OT - OK to Discharge: Yes (to next level of care when medically cleared by physician/medical team)    Subjective   Current Problem:  1. Ataxia        2. Cystitis        3. Dizziness  Carotid duplex bilateral    Carotid duplex bilateral      4. Vestibular neuronitis, unspecified ear  Carotid duplex bilateral        General:  General  Reason for Referral: ADL, safety assessment  Referred By: TRINI Euceda-CNP  Past Medical History Relevant to Rehab: Aortic stenosis, adenoma, hypertension, hyperlipidemia, GERD, hypothyroidism, depression, TAVR, cardiac catheterization, appendectomy, hip replacement, knee surgery  Co-Treatment: PT (co-eval to maximize safety when assessing mobility)  Prior to Session Communication: Bedside nurse who confirmed that patient is medically stable to participate in this OT session  Patient Position Received: Alarm off, not on at start of  session (cart, 2 rail up)  General Comment: Patient seen bedside; cooperative    Precautions:  Medical Precautions: Fall precautions (right post-op shoe due to great toe fracture)  Precautions Comment: dizziness when sat EOB/during mobility    Pain:  Pain Assessment  Pain Type: Acute pain  Pain Location:  (intermitttently left shoulder, BLE; right great toe)    Objective   Cognition:  Overall Cognitive Status: Within Functional Limits (some language barrier (primary [place]an) however comprehends one-step/simple commands)     Home Living:  Type of Home: House  Lives With: Alone  Home Adaptive Equipment: Walker rolling or standard  Home Layout: Multi-level  Home Access: Level entry     Prior Function:  Level of [place]: Independent with ADLs and functional transfers  Receives Help From:  (daugther for homemaking and driving)  Hand Dominance: Right    Current ADL:  LE Dressing Assistance: Total  LE Dressing Deficit: Don/doff R sock, Don/doff L sock (right post-op shoe)  ADL Comments: To further address in OT sessions using adaptive equipment/assistive techniques to promote indep. and ease in performance    Bed Mobility/Transfers:   Bed Mobility  Bed Mobility: Yes  Bed Mobility 1  Bed Mobility 1: Supine to sitting, Sitting to supine  Level of Assistance 1: Moderate assistance, Moderate verbal cues  Bed Mobility Comments 1: HOB elevated supine to sit  Transfers  Transfer: Yes  Transfer 1  Transfer From 1: Sit to, Stand to  Transfer to 1:  (cart)  Transfer Device 1: Walker  Transfer Level of Assistance 1: Minimum assistance, Moderate verbal cues    Ambulation/Gait Training:  Functional Mobility  Functional Mobility Performed: Yes  Functional Mobility 1  Device 1: Rolling walker  Assistance 1: Moderate assistance, Moderate verbal cues  Comments 1: performed few sidesteps along cart    Sitting Balance:  Static Sitting Balance  Static Sitting-Level of Assistance: Distant supervision    Standing Balance:  Static Standing  Balance  Static Standing-Level of Assistance: Minimum assistance  Static Standing-Comment/Number of Minutes: fair    Sensation:  Sensation Comment: intermittent numbness/tingling fingers    Extremities: RUE   RUE : Within Functional Limits (grossly observed during mobility tasks) and LUE   LUE: Within Functional Limits (grossly observed during mobility tasks)    Outcome Measures: [facility] Daily Activity  Putting on and taking off regular lower body clothing: Total  Bathing (including washing, rinsing, drying): A lot  Putting on and taking off regular upper body clothing: A little  Toileting, which includes using toilet, bedpan or urinal: A lot  Taking care of personal grooming such as brushing teeth: A little  Eating Meals: None  Daily Activity - Total Score: 15     EDUCATION:  Education Documentation  Precautions, taught by Margaret Thorne OT at 8/28/2024 11:59 AM.  Learner: Patient  Readiness: Acceptance  Method: Explanation  Response: Demonstrated Understanding, Needs Reinforcement  Comment: wear of right post-op/surgical shoe; instructions/cues during mobility tasks    Goals:   Encounter Problems       Encounter Problems (Active)       OT Goals       Patient will complete upper and lower body bathing/dressing; toileting with modified independence using adaptive equipment as needed  (Progressing)       Start:  08/28/24    Expected End:  09/11/24            Patient will perform bed mobility and functional transfers safely and independently: bed, chair, commode using DME as needed  (Progressing)       Start:  08/28/24    Expected End:  09/11/24            Patient will tolerate standing for 5 mins. and show overall good (-) standing balance during ADL's and functional transfers/mobility  (Progressing)       Start:  08/28/24    Expected End:  09/11/24               OT Goals       Patient will adhere to wearing of right surgical shoe precaution during ADL/mobility. (Progressing)       Start:  08/28/24    Expected End:   09/11/24

## 2024-08-28 NOTE — H&P
History Of Present Illness  Ayo Bueno is a 83 y.o. female presenting to the emergency department for evaluation of a fall.  Patient states she stood up and felt dizzy, lost her balance and fell backwards hitting her head.  Patient denies loss of consciousness.  Patient states she is not on anticoagulation but states that she does take a baby aspirin daily.  Patient believes she may have gotten up too quickly, states she was in her normal state of health prior to the fall.  Patient denies chest pain or shortness of breath.  Patient denies recent illness, nausea, vomiting, diarrhea.  Patient does endorse pain in her right great toe from the fall. A combination of HILDA and google translate were used in this discussion.     In ED, patient was found to have a UTI, urine cultures pending, started on IV ceftriaxone.  WBCs 3.4, platelets 148, glucose 105, BUN 24, troponin 19 with a repeat of 19.  Magnesium and BNP within normal limits.  Blood pressure 165/70, heart rate 64, respirations 12, temperature 36.9 °C, SpO2 99% on room air.  CT of the head completed and negative for acute process, CT of the C-spine completed and negative for acute process per radiology review.  X-ray of the right foot showing a mildly displaced fracture of the first phalanx per radiology review.  Postop shoe ordered.  CT angio head and neck ordered and pending.  EKG completed showing sinus rhythm at a rate of 68 with a right bundle branch block as read per ER physician review.  Patient given IV fluids.  Patient admitted to telemetry observation under the care of Dr. Batista who will continue to follow.  I was asked to do H&P and place initial admission orders.     *Previous medical records reviewed.    Past Medical History  Aortic stenosis, adenoma, hypertension, hyperlipidemia, GERD, hypothyroidism, depression  Surgical History  TAVR, cardiac catheterization, appendectomy, hip replacement, knee surgery       Social History  Never smoker,  no drug use, no alcohol use  Family History  TB, stroke, CAD, diabetes       Allergies  Prednisone, Aspirin, and Sulfa (sulfonamide antibiotics)    Review of Systems  A 10 point review of systems was completed and negative except as listed in HPI  Physical Exam  HENT:      Mouth/Throat:      Mouth: Mucous membranes are dry.      Pharynx: Oropharynx is clear.   Eyes:      Pupils: Pupils are equal, round, and reactive to light.   Cardiovascular:      Rate and Rhythm: Normal rate and regular rhythm.   Pulmonary:      Effort: Pulmonary effort is normal.      Breath sounds: Normal breath sounds.   Abdominal:      General: Bowel sounds are normal.      Palpations: Abdomen is soft.   Musculoskeletal:      Cervical back: Normal range of motion.      Comments: Right great toe tenderness   Skin:     General: Skin is warm and dry.      Capillary Refill: Capillary refill takes less than 2 seconds.   Neurological:      General: No focal deficit present.      Mental Status: She is alert.   Psychiatric:         Mood and Affect: Mood normal.         Behavior: Behavior normal.          Last Recorded Vitals  Blood pressure 130/64, pulse 85, temperature 36.8 °C (98.2 °F), temperature source Temporal, resp. rate 17, height 1.524 m (5'), weight 76.2 kg (168 lb), SpO2 98%.    Relevant Results  XR chest 1 view    Result Date: 8/27/2024  Interpreted By:  Zakia Foster, STUDY: Chest, single AP view.   INDICATION: Signs/Symptoms:syncope.   COMPARISON: 01/31/2024   ACCESSION NUMBER(S): [id number]   ORDERING CLINICIAN: MASOOD PETERS   FINDINGS: The cardiac silhouette size is within normal limits. There is no focal consolidation, edema or pneumothorax. No sizeable pleural effusion. No acute osseous abnormality.       1. No acute cardiopulmonary process.   MACRO: None.   Signed by: Zakia Foster 8/27/2024 9:27 PM Dictation workstation:   [id number]    CT cervical spine wo IV contrast    Result Date: 8/27/2024  Interpreted By:   Tunde Valencia, STUDY: CT CERVICAL SPINE WO IV CONTRAST;  8/27/2024 5:42 pm   INDICATION: Signs/Symptoms:Fall over 67.     COMPARISON: None.   ACCESSION NUMBER(S): [id number]   ORDERING CLINICIAN: GUILLERMINA CELAYA   TECHNIQUE: Axial CT images of the cervical spine are obtained. Axial, coronal and sagittal reconstructions are provided for review.   FINDINGS:     Fractures: There is no evidence for an acute fracture of the cervical spine.   Vertebral Alignment: No posttraumatic malalignment. There is overall straightening of the normal cervical lordosis, presumed secondary to patient positioning or spasm.   Craniocervical Junction: The odontoid process and craniocervical junction are intact.   Vertebrae/Disc Spaces:  Multilevel spondylosis. Multilevel disc space narrowing. Multilevel facet arthropathy Multilevel uncovertebral hypertrophy.Multilevel osteophyte formation.   Prevertebral/Paraspinal Soft Tissues: The prevertebral and paraspinal soft tissues are unremarkable.         Multilevel spondylosis without acute osseous abnormality of the cervical spine.   MACRO: None   Signed by: Tunde Valencia 8/27/2024 6:04 PM Dictation workstation:   [id number]    CT head wo IV contrast    Result Date: 8/27/2024  Interpreted By:  Tunde Valencia, STUDY: CT HEAD WO IV CONTRAST;  8/27/2024 5:43 pm   INDICATION: Signs/Symptoms:Fall over 67.   COMPARISON: Head CT 05/28/2009   ACCESSION NUMBER(S): [id number]   ORDERING CLINICIAN: GUILLERMINA CELAYA   TECHNIQUE: Noncontrast axial CT scan of head was performed. Angled reformats in brain and bone windows were generated. The images were reviewed in bone, brain, blood and soft tissue windows.   FINDINGS: CSF Spaces: The ventricles, sulci and basal cisterns are within normal limits. There is no extraaxial fluid collection.   Parenchyma: Mild to moderate parenchymal atrophy the grey-white differentiation is intact. There is no mass effect or midline shift. There is no intracranial hemorrhage.  There is interval increased calcification of the right cerebellum which is progressive since prior comparison imaging from 05/20 8/9   Calvarium: No acute displaced calvarial fracture.   Paranasal sinuses and mastoids: Visualized paranasal sinuses and mastoids are clear.       No CT evidence of acute intracranial injury.       MACRO: None     Signed by: Tunde Valencia 8/27/2024 6:02 PM Dictation workstation:   [id number]    XR foot right 3+ views    Result Date: 8/27/2024  STUDY: Foot Radiographs; 08/27/2024 5:24PM INDICATION: Right foot pain post fall. COMPARISON: None Available. ACCESSION NUMBER(S): [id number] ORDERING CLINICIAN: GUILLERMINA CELAYA TECHNIQUE:  Three view(s) of the right foot. FINDINGS:  There is a slightly displaced fracture of the first proximal phalanx. The alignment is anatomic.  No soft tissue abnormality is seen.    Results for orders placed or performed during the hospital encounter of 08/27/24 (from the past 24 hour(s))   CBC and Auto Differential   Result Value Ref Range    WBC 3.4 (L) 4.4 - 11.3 x10*3/uL    nRBC 0.0 0.0 - 0.0 /100 WBCs    RBC 4.16 4.00 - 5.20 x10*6/uL    Hemoglobin 12.1 12.0 - 16.0 g/dL    Hematocrit 38.2 36.0 - 46.0 %    MCV 92 80 - 100 fL    MCH 29.1 26.0 - 34.0 pg    MCHC 31.7 (L) 32.0 - 36.0 g/dL    RDW 14.1 11.5 - 14.5 %    Platelets 148 (L) 150 - 450 x10*3/uL    Neutrophils % 41.6 40.0 - 80.0 %    Immature Granulocytes %, Automated 0.3 0.0 - 0.9 %    Lymphocytes % 42.8 13.0 - 44.0 %    Monocytes % 12.9 2.0 - 10.0 %    Eosinophils % 1.8 0.0 - 6.0 %    Basophils % 0.6 0.0 - 2.0 %    Neutrophils Absolute 1.42 (L) 1.60 - 5.50 x10*3/uL    Immature Granulocytes Absolute, Automated 0.01 0.00 - 0.50 x10*3/uL    Lymphocytes Absolute 1.46 0.80 - 3.00 x10*3/uL    Monocytes Absolute 0.44 0.05 - 0.80 x10*3/uL    Eosinophils Absolute 0.06 0.00 - 0.40 x10*3/uL    Basophils Absolute 0.02 0.00 - 0.10 x10*3/uL   Comprehensive metabolic panel   Result Value Ref Range    Glucose 105  (H) 74 - 99 mg/dL    Sodium 139 136 - 145 mmol/L    Potassium 3.6 3.5 - 5.3 mmol/L    Chloride 100 98 - 107 mmol/L    Bicarbonate 32 21 - 32 mmol/L    Anion Gap 11 10 - 20 mmol/L    Urea Nitrogen 24 (H) 6 - 23 mg/dL    Creatinine 0.80 0.50 - 1.05 mg/dL    eGFR 74 >60 mL/min/1.73m*2    Calcium 9.5 8.6 - 10.3 mg/dL    Albumin 4.1 3.4 - 5.0 g/dL    Alkaline Phosphatase 51 33 - 136 U/L    Total Protein 7.4 6.4 - 8.2 g/dL    AST 20 9 - 39 U/L    Bilirubin, Total 0.6 0.0 - 1.2 mg/dL    ALT 12 7 - 45 U/L   Magnesium   Result Value Ref Range    Magnesium 1.94 1.60 - 2.40 mg/dL   Troponin I, High Sensitivity   Result Value Ref Range    Troponin I, High Sensitivity 19 (H) 0 - 13 ng/L   B-Type Natriuretic Peptide   Result Value Ref Range    BNP 96 0 - 99 pg/mL   Sars-CoV-2 PCR   Result Value Ref Range    Coronavirus 2019, PCR Not Detected Not Detected   Urinalysis with Reflex Culture and Microscopic   Result Value Ref Range    Color, Urine Yellow Light-Yellow, Yellow, Dark-Yellow    Appearance, Urine Turbid (N) Clear    Specific Gravity, Urine 1.024 1.005 - 1.035    pH, Urine 6.5 5.0, 5.5, 6.0, 6.5, 7.0, 7.5, 8.0    Protein, Urine 10 (TRACE) NEGATIVE, 10 (TRACE), 20 (TRACE) mg/dL    Glucose, Urine Normal Normal mg/dL    Blood, Urine NEGATIVE NEGATIVE    Ketones, Urine NEGATIVE NEGATIVE mg/dL    Bilirubin, Urine NEGATIVE NEGATIVE    Urobilinogen, Urine Normal Normal mg/dL    Nitrite, Urine 2+ (A) NEGATIVE    Leukocyte Esterase, Urine 250 Marcela/µL (A) NEGATIVE   Extra Urine Gray Tube   Result Value Ref Range    Extra Tube Hold for add-ons.    Microscopic Only, Urine   Result Value Ref Range    WBC, Urine 21-50 (A) 1-5, NONE /HPF    RBC, Urine 1-2 NONE, 1-2, 3-5 /HPF    Bacteria, Urine 1+ (A) NONE SEEN /HPF    Mucus, Urine FEW Reference range not established. /LPF    Hyaline Casts, Urine 1+ (A) NONE /LPF   Troponin I, High Sensitivity   Result Value Ref Range    Troponin I, High Sensitivity 19 (H) 0 - 13 ng/L   CBC   Result Value  Ref Range    WBC 4.2 (L) 4.4 - 11.3 x10*3/uL    nRBC 0.0 0.0 - 0.0 /100 WBCs    RBC 3.86 (L) 4.00 - 5.20 x10*6/uL    Hemoglobin 11.6 (L) 12.0 - 16.0 g/dL    Hematocrit 34.0 (L) 36.0 - 46.0 %    MCV 88 80 - 100 fL    MCH 30.1 26.0 - 34.0 pg    MCHC 34.1 32.0 - 36.0 g/dL    RDW 14.0 11.5 - 14.5 %    Platelets 148 (L) 150 - 450 x10*3/uL   Basic Metabolic Panel   Result Value Ref Range    Glucose 105 (H) 74 - 99 mg/dL    Sodium 140 136 - 145 mmol/L    Potassium 3.5 3.5 - 5.3 mmol/L    Chloride 101 98 - 107 mmol/L    Bicarbonate 29 21 - 32 mmol/L    Anion Gap 14 10 - 20 mmol/L    Urea Nitrogen 23 6 - 23 mg/dL    Creatinine 0.77 0.50 - 1.05 mg/dL    eGFR 78 >60 mL/min/1.73m*2    Calcium 9.1 8.6 - 10.3 mg/dL   Troponin I, High Sensitivity   Result Value Ref Range    Troponin I, High Sensitivity 17 (H) 0 - 13 ng/L       Assessment/Plan   Ayo is an 83-year-old Burmese speaking female presenting to emergency department for evaluation of a fall.  Patient states that she stood up and felt dizzy and then fell backwards hitting her head, no loss of consciousness.  No anticoagulation however she does take a baby aspirin daily.  CT of the head/C-spine negative for acute process.  Patient complaining of right great toe pain, x-rays showing a mildly displaced first phalanx fracture.  Postop shoe applied.  Patient found to have a UTI, urine cultures pending, started on IV ceftriaxone and IV fluids.  CT angio of the head and neck ordered and pending.  Mildly elevated troponin of 19 with a repeat of 19, will continue to trend.  Patient admitted to telemetry observation for further medical management.    Dizziness/acute cystitis/elevated troponin/right great toe fracture  Admit to telemetry observation per Dr. Batista  See imaging results above  CT angio head and neck pending  Telemetry monitoring  Urine cultures pending  Continue IV ceftriaxone every 24 hours  IV Zofran as needed  Tylenol as needed  Continue IV fluids  Trend  troponin  PT/OT  Postop shoe ordered  Repeat labs in a.m.    GERD/hypothyroidism/depression/hypertension/CAD/aortic stenosis/hyperlipidemia  #Chronic conditions  Telemetry monitoring  Cardiac diet  Nursing to complete home med rec    DVT Ppx  SCDs  Heparin subcutaneous  Out of bed with assistance    8/28: doing well, still dizzy, carotid US ordered, MRI of brain      I spent 55 minutes in the professional and overall care of this patient.  Hans Batista, DO

## 2024-08-29 LAB
ATRIAL RATE: 68 BPM
BACTERIA UR CULT: ABNORMAL
P AXIS: -50 DEGREES
PR INTERVAL: 166 MS
Q ONSET: 253 MS
QRS COUNT: 11 BEATS
QRS DURATION: 131 MS
QT INTERVAL: 477 MS
QTC CALCULATION(BAZETT): 508 MS
QTC FREDERICIA: 497 MS
R AXIS: -20 DEGREES
T AXIS: 3 DEGREES
T OFFSET: 491 MS
VENTRICULAR RATE: 68 BPM

## 2024-08-29 PROCEDURE — 2500000002 HC RX 250 W HCPCS SELF ADMINISTERED DRUGS (ALT 637 FOR MEDICARE OP, ALT 636 FOR OP/ED): Performed by: PHYSICIAN ASSISTANT

## 2024-08-29 PROCEDURE — 2500000004 HC RX 250 GENERAL PHARMACY W/ HCPCS (ALT 636 FOR OP/ED): Performed by: NURSE PRACTITIONER

## 2024-08-29 PROCEDURE — 1200000002 HC GENERAL ROOM WITH TELEMETRY DAILY

## 2024-08-29 PROCEDURE — 96372 THER/PROPH/DIAG INJ SC/IM: CPT | Performed by: NURSE PRACTITIONER

## 2024-08-29 PROCEDURE — 2500000001 HC RX 250 WO HCPCS SELF ADMINISTERED DRUGS (ALT 637 FOR MEDICARE OP): Performed by: PHYSICIAN ASSISTANT

## 2024-08-29 PROCEDURE — 2500000001 HC RX 250 WO HCPCS SELF ADMINISTERED DRUGS (ALT 637 FOR MEDICARE OP): Performed by: STUDENT IN AN ORGANIZED HEALTH CARE EDUCATION/TRAINING PROGRAM

## 2024-08-29 PROCEDURE — 99232 SBSQ HOSP IP/OBS MODERATE 35: CPT | Performed by: STUDENT IN AN ORGANIZED HEALTH CARE EDUCATION/TRAINING PROGRAM

## 2024-08-29 RX ORDER — MECLIZINE HYDROCHLORIDE 25 MG/1
25 TABLET ORAL 3 TIMES DAILY PRN
Status: DISCONTINUED | OUTPATIENT
Start: 2024-08-29 | End: 2024-09-01 | Stop reason: HOSPADM

## 2024-08-29 ASSESSMENT — COGNITIVE AND FUNCTIONAL STATUS - GENERAL
WALKING IN HOSPITAL ROOM: A LOT
CLIMB 3 TO 5 STEPS WITH RAILING: A LOT
HELP NEEDED FOR BATHING: A LITTLE
TURNING FROM BACK TO SIDE WHILE IN FLAT BAD: A LITTLE
MOBILITY SCORE: 16
STANDING UP FROM CHAIR USING ARMS: A LITTLE
DRESSING REGULAR LOWER BODY CLOTHING: A LOT
DAILY ACTIVITIY SCORE: 20
TOILETING: A LITTLE
MOVING TO AND FROM BED TO CHAIR: A LOT

## 2024-08-29 ASSESSMENT — PAIN - FUNCTIONAL ASSESSMENT: PAIN_FUNCTIONAL_ASSESSMENT: 0-10

## 2024-08-29 ASSESSMENT — PAIN SCALES - GENERAL: PAINLEVEL_OUTOF10: 0 - NO PAIN

## 2024-08-29 NOTE — CARE PLAN
The patient's goals for the shift include      The clinical goals for the shift include pt will remain safe and free from falls throughout the shift    Over the shift, the patient did not make progress toward the following goals. Barriers to progression include weakness. Recommendations to address these barriers include PT/OT, frequent rounding.

## 2024-08-29 NOTE — PROGRESS NOTES
24 1427   Discharge Planning   Living Arrangements Alone   Support Systems Family members;Children;Friends/neighbors   Assistance Needed ADLs and IADLs   Type of Residence Private residence   Number of Stairs to Enter Residence 3   Number of Stairs Within Residence 8   Do you have animals or pets at home? No   Who is requesting discharge planning? Provider   Home or Post Acute Services Post acute facilities (Rehab/SNF/etc)   Type of Post Acute Facility Services Skilled nursing;Rehab   Expected Discharge Disposition SNF   Does the patient need discharge transport arranged? Yes   RoundTrip coordination needed? Yes     TCC Note: Met with pt/family at bedside, introduced self and explained role on the Transition of Care Team. Verified name, , demographics, insurance, PCP is Timothy Somers.  Emergency contact is daughter, Elham Townsend Phone: [phone]. Pt. lives alone in a multi level home with e ELDER and has 8 steps to Bedroom. 1st floor bath. Pt. is Independent with ADLs, cooks and cleans. Daughter does transportation Pt. had recent fall on , in which she hit her head and led to this hospitalization.  Uses a cane as an assistive device for ambulation. Pt. is not diabetic and does not use any home O2 or any other home services/supplies. Preferred pharmacy is [facility] on [address] or mail order. No problems affording or obtaining medications. Discussed with pt and family Therapy [facility] scores and recommendation. Provided Skilled Nursing Facility list from [facility] directory, which  includes facilities that are within  Post- Acute Quality network as well as meeting patient's medical needs and are in-network for patient's insurance while also in discharge geographic are patient/family prefers. List identifies each facilities CMS star rating. Choices for SNF in order of preference are: [facility] and [facility]. Requested DSC to please send referrals. Pt has Medicare  and was flipped to Inpatient today. According to Medicare Guidelines, pt may admit to SNF after 3 Midnights, so she can admit on Sunday. Pt/Family aware. RN informed. Transitions of Care will continue to follow until discharge. Yola Chavez, MSN, RN, TCC.    ADDENDUM: Was informed by DSC that pt was accepted to [facility]. And can discharge there on Sunday, pending Medical stability. Voicemail message left for daughter. Yola Chavez, EASTON, RN, TCC.

## 2024-08-29 NOTE — CARE PLAN
The patient's goals for the shift include comfort, sleep and safety     The clinical goals for the shift include patient will remain safe and free from injury for entire shift.

## 2024-08-29 NOTE — PROGRESS NOTES
Social work consult placed for positive medical risk screen. SW reviewed pt's chart and communicated with TCC. No SW needs foreseen at this time. SW signing off; available upon request.

## 2024-08-30 PROCEDURE — 2500000001 HC RX 250 WO HCPCS SELF ADMINISTERED DRUGS (ALT 637 FOR MEDICARE OP): Performed by: STUDENT IN AN ORGANIZED HEALTH CARE EDUCATION/TRAINING PROGRAM

## 2024-08-30 PROCEDURE — 1200000002 HC GENERAL ROOM WITH TELEMETRY DAILY

## 2024-08-30 PROCEDURE — 2500000002 HC RX 250 W HCPCS SELF ADMINISTERED DRUGS (ALT 637 FOR MEDICARE OP, ALT 636 FOR OP/ED): Performed by: PHYSICIAN ASSISTANT

## 2024-08-30 PROCEDURE — 99232 SBSQ HOSP IP/OBS MODERATE 35: CPT | Performed by: STUDENT IN AN ORGANIZED HEALTH CARE EDUCATION/TRAINING PROGRAM

## 2024-08-30 PROCEDURE — 2500000004 HC RX 250 GENERAL PHARMACY W/ HCPCS (ALT 636 FOR OP/ED): Performed by: NURSE PRACTITIONER

## 2024-08-30 PROCEDURE — 97530 THERAPEUTIC ACTIVITIES: CPT | Mod: GO

## 2024-08-30 PROCEDURE — 2500000001 HC RX 250 WO HCPCS SELF ADMINISTERED DRUGS (ALT 637 FOR MEDICARE OP): Performed by: PHYSICIAN ASSISTANT

## 2024-08-30 PROCEDURE — 97535 SELF CARE MNGMENT TRAINING: CPT | Mod: GO

## 2024-08-30 ASSESSMENT — PAIN - FUNCTIONAL ASSESSMENT
PAIN_FUNCTIONAL_ASSESSMENT: 0-10

## 2024-08-30 ASSESSMENT — COGNITIVE AND FUNCTIONAL STATUS - GENERAL
DAILY ACTIVITIY SCORE: 18
TURNING FROM BACK TO SIDE WHILE IN FLAT BAD: A LITTLE
DRESSING REGULAR LOWER BODY CLOTHING: A LOT
TOILETING: A LITTLE
CLIMB 3 TO 5 STEPS WITH RAILING: A LOT
STANDING UP FROM CHAIR USING ARMS: A LITTLE
DAILY ACTIVITIY SCORE: 20
TOILETING: A LITTLE
MOVING TO AND FROM BED TO CHAIR: A LITTLE
TURNING FROM BACK TO SIDE WHILE IN FLAT BAD: A LITTLE
PERSONAL GROOMING: A LITTLE
CLIMB 3 TO 5 STEPS WITH RAILING: A LOT
DRESSING REGULAR LOWER BODY CLOTHING: A LOT
HELP NEEDED FOR BATHING: A LITTLE
DRESSING REGULAR LOWER BODY CLOTHING: A LOT
MOBILITY SCORE: 18
DAILY ACTIVITIY SCORE: 20
WALKING IN HOSPITAL ROOM: A LITTLE
HELP NEEDED FOR BATHING: A LITTLE
STANDING UP FROM CHAIR USING ARMS: A LITTLE
TOILETING: A LITTLE
MOVING TO AND FROM BED TO CHAIR: A LITTLE
WALKING IN HOSPITAL ROOM: A LITTLE
DRESSING REGULAR UPPER BODY CLOTHING: A LITTLE
HELP NEEDED FOR BATHING: A LITTLE
MOBILITY SCORE: 18

## 2024-08-30 ASSESSMENT — ACTIVITIES OF DAILY LIVING (ADL): HOME_MANAGEMENT_TIME_ENTRY: 23

## 2024-08-30 ASSESSMENT — PAIN SCALES - GENERAL
PAINLEVEL_OUTOF10: 0 - NO PAIN
PAINLEVEL_OUTOF10: 5 - MODERATE PAIN
PAINLEVEL_OUTOF10: 0 - NO PAIN
PAINLEVEL_OUTOF10: 3

## 2024-08-30 ASSESSMENT — PAIN DESCRIPTION - ORIENTATION: ORIENTATION: LEFT

## 2024-08-30 ASSESSMENT — PAIN DESCRIPTION - LOCATION: LOCATION: SHOULDER

## 2024-08-30 NOTE — CARE PLAN
The patient's goals for the shift include      The clinical goals for the shift include pt will remain safe and free from falls    Over the shift, the patient did not make progress toward the following goals. Barriers to progression include weakness. Recommendations to address these barriers include PT/OT, ambulation assistance.

## 2024-08-30 NOTE — PROGRESS NOTES
Ayo Bueno is a 83 y.o. female on day 1 of admission presenting with Dizziness.    Subjective      Doing better, dtr at bedside    Objective     Physical Exam  Constitutional:       Appearance: Normal appearance.   HENT:      Head: Normocephalic and atraumatic.      Right Ear: Tympanic membrane and ear canal normal.      Left Ear: Tympanic membrane and ear canal normal.      Mouth/Throat:      Mouth: Mucous membranes are moist.      Pharynx: Oropharynx is clear.   Eyes:      Extraocular Movements: Extraocular movements intact.      Conjunctiva/sclera: Conjunctivae normal.      Pupils: Pupils are equal, round, and reactive to light.   Cardiovascular:      Rate and Rhythm: Normal rate and regular rhythm.      Pulses: Normal pulses.      Heart sounds: Normal heart sounds.   Pulmonary:      Effort: Pulmonary effort is normal.      Breath sounds: Normal breath sounds.   Abdominal:      General: Abdomen is flat. Bowel sounds are normal.      Palpations: Abdomen is soft.   Musculoskeletal:         General: Normal range of motion.      Cervical back: Normal range of motion and neck supple.   Skin:     General: Skin is warm and dry.      Capillary Refill: Capillary refill takes 2 to 3 seconds.   Neurological:      General: No focal deficit present.      Mental Status: She is alert and oriented to person, place, and time. Mental status is at baseline.   Psychiatric:         Mood and Affect: Mood normal.         Behavior: Behavior normal.         Thought Content: Thought content normal.         Judgment: Judgment normal.         Last Recorded Vitals  Blood pressure 136/71, pulse 72, temperature 36.7 °C (98.1 °F), temperature source Temporal, resp. rate 16, height 1.524 m (5'), weight 76.3 kg (168 lb 3.4 oz), SpO2 94%.  Intake/Output last 3 Shifts:  I/O last 3 completed shifts:  In: 465.8 (6.1 mL/kg) [I.V.:415.8 (5.5 mL/kg); IV Piggyback:50]  Out: 600 (7.9 mL/kg) [Urine:600 (0.2 mL/kg/hr)]  Dosing Weight: 76.2 kg      Relevant Results                              Assessment/Plan   Assessment & Plan  Dizziness    Ataxia    Ayo is an 83-year-old Turkish speaking female presenting to emergency department for evaluation of a fall.  Patient states that she stood up and felt dizzy and then fell backwards hitting her head, no loss of consciousness.  No anticoagulation however she does take a baby aspirin daily.  CT of the head/C-spine negative for acute process.  Patient complaining of right great toe pain, x-rays showing a mildly displaced first phalanx fracture.  Postop shoe applied.  Patient found to have a UTI, urine cultures pending, started on IV ceftriaxone and IV fluids.  CT angio of the head and neck ordered and pending.  Mildly elevated troponin of 19 with a repeat of 19, will continue to trend.  Patient admitted to telemetry observation for further medical management.     Dizziness/acute cystitis/elevated troponin/right great toe fracture  Admit to telemetry observation per Dr. Batista  See imaging results above  CT angio head and neck pending  Telemetry monitoring  Urine cultures pending  Continue IV ceftriaxone every 24 hours  IV Zofran as needed  Tylenol as needed  Continue IV fluids  Trend troponin  PT/OT  Postop shoe ordered  Repeat labs in a.m.     GERD/hypothyroidism/depression/hypertension/CAD/aortic stenosis/hyperlipidemia  #Chronic conditions  Telemetry monitoring  Cardiac diet  Nursing to complete home med rec     DVT Ppx  SCDs  Heparin subcutaneous  Out of bed with assistance     8/28: doing well, still dizzy, carotid US ordered, MRI of brain    8/29: doing ok [facility] low meclizine needs snf       I spent 35 minutes in the professional and overall care of this patient.      Hans Batista, DO

## 2024-08-30 NOTE — PROGRESS NOTES
Occupational Therapy    OT Treatment    Patient Name: Ayo Bueno  MRN: [id number]  Today's Date: 8/30/2024  Time Calculation  Start Time: 0945  Stop Time: 1024  Time Calculation (min): 39 min        Assessment:  Pt in recliner/alarm on, with daughter at end of session        Plan:  Treatment Interventions: ADL retraining, Functional transfer training, Patient/family training, Equipment evaluation/education, Compensatory technique education, Endurance training  OT Frequency: 3 times per week  OT Discharge Recommendations: Moderate intensity level of continued care  OT - OK to Discharge: Yes (to next level of care when medically cleared by physician/medical team)  Treatment Interventions: ADL retraining, Functional transfer training, Patient/family training, Equipment evaluation/education, Compensatory technique education, Endurance training    Subjective   Previous Visit Info:  OT Last Visit  OT Received On: 08/30/24  General:  General  Family/Caregiver Present:  (daughter in during last part of session.)  Prior to Session Communication: Bedside nurse, PCT/NA/CTA (per RN, pt able to participate in session; RN and pca in room upon OTR's arrival)  Patient Position Received: Bed, 2 rail up, Alarm on  General Comment: c/o dizziness in standing and some in sitting  Precautions:  Medical Precautions: Fall precautions  Precautions Comment: rt post-op shoe donned during session    Vital Signs (Past 2hrs)        Date/Time Vitals Session Patient Position Pulse Resp SpO2 BP MAP (mmHg)    08/30/24 13:28:21 --  --  66  16  93 %  129/60  86                         Pain:  Pain Assessment  Pain Assessment: 0-10  0-10 (Numeric) Pain Score: 5 - Moderate pain  Pain Location:  (shoulders/neck 5/10, and rt foot 7-8/10)    Objective    Cognition:  Cognition  Processing Speed:  (slower processing and problem solving noted)  Coordination:     Activities of Daily Living: Grooming  Grooming Level of Assistance:  (completes grooming-  brushing hair, dentures/teeth in standing at sink at wwalker- cga with chair behind pt)    UE Bathing  UE Bathing Level of Assistance:  (sitting edge of bed, sba and cues for thoroughness; assist for back)    LE Bathing  LE Bathing Level of Assistance:  (cga/min a and cues for safety in standing for periarea; sitting for rest of task)    UE Dressing  UE Dressing Level of Assistance:  (changing gown with min a)    LE Dressing  Shoe Level of Assistance:  (donning rt sx shoe with min a only for strapping placement)  LE Dressing Where Assessed: Edge of bed       Functional Standing Tolerance:  Time: 5:00 at sink/wwalker, cga during grooming  Bed Mobility/Transfers: Bed Mobility  Bed Mobility:  (supine to sit from flat bed, min a)    Transfers  Transfer:  (sit to stand cga, chair, recliner cga and mod cues for walker safety, hand placement)      Functional Mobility:  Functional Mobility  Functional Mobility Performed:  (simple mobility via wwalker from bed to sink, to recliner cga/min a using rt sx shoe, c/o dizziness intermittently)           EDUCATION:       Goals:  Encounter Problems       Encounter Problems (Active)       OT Goals       Patient will complete upper and lower body bathing/dressing; toileting with modified independence using adaptive equipment as needed  (Progressing)       Start:  08/28/24    Expected End:  09/11/24            Patient will perform bed mobility and functional transfers safely and independently: bed, chair, commode using DME as needed  (Progressing)       Start:  08/28/24    Expected End:  09/11/24            Patient will tolerate standing for 5 mins. and show overall good (-) standing balance during ADL's and functional transfers/mobility  (Progressing)       Start:  08/28/24    Expected End:  09/11/24               OT Goals       Patient will adhere to wearing of right surgical shoe precaution during ADL/mobility. (Progressing)       Start:  08/28/24    Expected End:  09/11/24

## 2024-08-31 PROCEDURE — 2500000001 HC RX 250 WO HCPCS SELF ADMINISTERED DRUGS (ALT 637 FOR MEDICARE OP): Performed by: PHYSICIAN ASSISTANT

## 2024-08-31 PROCEDURE — 2500000001 HC RX 250 WO HCPCS SELF ADMINISTERED DRUGS (ALT 637 FOR MEDICARE OP): Performed by: STUDENT IN AN ORGANIZED HEALTH CARE EDUCATION/TRAINING PROGRAM

## 2024-08-31 PROCEDURE — 99232 SBSQ HOSP IP/OBS MODERATE 35: CPT | Performed by: STUDENT IN AN ORGANIZED HEALTH CARE EDUCATION/TRAINING PROGRAM

## 2024-08-31 PROCEDURE — 2500000004 HC RX 250 GENERAL PHARMACY W/ HCPCS (ALT 636 FOR OP/ED): Performed by: NURSE PRACTITIONER

## 2024-08-31 PROCEDURE — 2500000002 HC RX 250 W HCPCS SELF ADMINISTERED DRUGS (ALT 637 FOR MEDICARE OP, ALT 636 FOR OP/ED): Performed by: PHYSICIAN ASSISTANT

## 2024-08-31 PROCEDURE — 1200000002 HC GENERAL ROOM WITH TELEMETRY DAILY

## 2024-08-31 ASSESSMENT — COGNITIVE AND FUNCTIONAL STATUS - GENERAL
WALKING IN HOSPITAL ROOM: A LITTLE
MOVING TO AND FROM BED TO CHAIR: A LITTLE
TURNING FROM BACK TO SIDE WHILE IN FLAT BAD: A LITTLE
MOBILITY SCORE: 18
MOBILITY SCORE: 20
DRESSING REGULAR UPPER BODY CLOTHING: A LITTLE
DAILY ACTIVITIY SCORE: 20
TOILETING: A LITTLE
TOILETING: A LITTLE
CLIMB 3 TO 5 STEPS WITH RAILING: A LITTLE
HELP NEEDED FOR BATHING: A LITTLE
DRESSING REGULAR LOWER BODY CLOTHING: A LITTLE
WALKING IN HOSPITAL ROOM: A LITTLE
HELP NEEDED FOR BATHING: A LITTLE
STANDING UP FROM CHAIR USING ARMS: A LITTLE
STANDING UP FROM CHAIR USING ARMS: A LITTLE
DRESSING REGULAR LOWER BODY CLOTHING: A LITTLE
DAILY ACTIVITIY SCORE: 20
CLIMB 3 TO 5 STEPS WITH RAILING: A LOT
DRESSING REGULAR UPPER BODY CLOTHING: A LITTLE
MOVING TO AND FROM BED TO CHAIR: A LITTLE

## 2024-08-31 ASSESSMENT — PAIN - FUNCTIONAL ASSESSMENT
PAIN_FUNCTIONAL_ASSESSMENT: 0-10
PAIN_FUNCTIONAL_ASSESSMENT: 0-10

## 2024-08-31 ASSESSMENT — PAIN SCALES - GENERAL
PAINLEVEL_OUTOF10: 1
PAINLEVEL_OUTOF10: 3
PAINLEVEL_OUTOF10: 3

## 2024-08-31 ASSESSMENT — PAIN DESCRIPTION - ORIENTATION: ORIENTATION: LEFT

## 2024-08-31 ASSESSMENT — PAIN DESCRIPTION - LOCATION: LOCATION: SHOULDER

## 2024-08-31 ASSESSMENT — PAIN DESCRIPTION - DESCRIPTORS: DESCRIPTORS: DISCOMFORT

## 2024-08-31 NOTE — CARE PLAN
The patient's goals for the shift include not falling during shift.    The clinical goals for the shift include Patient will remain safe and free from injury for entire shift.

## 2024-08-31 NOTE — PROGRESS NOTES
Ayo Bueno is a 83 y.o. female on day 2 of admission presenting with Dizziness.    Subjective     Resting, no issues    Objective     Physical Exam  Constitutional:       Appearance: Normal appearance.   HENT:      Head: Normocephalic and atraumatic.      Right Ear: Tympanic membrane and ear canal normal.      Left Ear: Tympanic membrane and ear canal normal.      Mouth/Throat:      Mouth: Mucous membranes are moist.      Pharynx: Oropharynx is clear.   Eyes:      Extraocular Movements: Extraocular movements intact.      Conjunctiva/sclera: Conjunctivae normal.      Pupils: Pupils are equal, round, and reactive to light.   Cardiovascular:      Rate and Rhythm: Normal rate and regular rhythm.      Pulses: Normal pulses.      Heart sounds: Normal heart sounds.   Pulmonary:      Effort: Pulmonary effort is normal.      Breath sounds: Normal breath sounds.   Abdominal:      General: Abdomen is flat. Bowel sounds are normal.      Palpations: Abdomen is soft.   Musculoskeletal:         General: Normal range of motion.      Cervical back: Normal range of motion and neck supple.   Skin:     General: Skin is warm and dry.      Capillary Refill: Capillary refill takes 2 to 3 seconds.   Neurological:      General: No focal deficit present.      Mental Status: She is alert and oriented to person, place, and time. Mental status is at baseline.   Psychiatric:         Mood and Affect: Mood normal.         Behavior: Behavior normal.         Thought Content: Thought content normal.         Judgment: Judgment normal.         Last Recorded Vitals  Blood pressure 139/70, pulse 83, temperature 36.3 °C (97.3 °F), temperature source Temporal, resp. rate 18, height 1.524 m (5'), weight 76.3 kg (168 lb 3.4 oz), SpO2 92%.  Intake/Output last 3 Shifts:  I/O last 3 completed shifts:  In: - (0 mL/kg)   Out: 200 (2.6 mL/kg) [Urine:200 (0.1 mL/kg/hr)]  Dosing Weight: 76.2 kg     Relevant Results                               Assessment/Plan   Assessment & Plan  Dizziness    Ataxia    Ayo is an 83-year-old Kazakh speaking female presenting to emergency department for evaluation of a fall.  Patient states that she stood up and felt dizzy and then fell backwards hitting her head, no loss of consciousness.  No anticoagulation however she does take a baby aspirin daily.  CT of the head/C-spine negative for acute process.  Patient complaining of right great toe pain, x-rays showing a mildly displaced first phalanx fracture.  Postop shoe applied.  Patient found to have a UTI, urine cultures pending, started on IV ceftriaxone and IV fluids.  CT angio of the head and neck ordered and pending.  Mildly elevated troponin of 19 with a repeat of 19, will continue to trend.  Patient admitted to telemetry observation for further medical management.     Dizziness/acute cystitis/elevated troponin/right great toe fracture  Admit to telemetry observation per Dr. Batista  See imaging results above  CT angio head and neck pending  Telemetry monitoring  Urine cultures pending  Continue IV ceftriaxone every 24 hours  IV Zofran as needed  Tylenol as needed  Continue IV fluids  Trend troponin  PT/OT  Postop shoe ordered  Repeat labs in a.m.     GERD/hypothyroidism/depression/hypertension/CAD/aortic stenosis/hyperlipidemia  #Chronic conditions  Telemetry monitoring  Cardiac diet  Nursing to complete home med rec     DVT Ppx  SCDs  Heparin subcutaneous  Out of bed with assistance     8/28: doing well, still dizzy, carotid US ordered, MRI of brain    8/29: doing ok [facility] low meclizine needs snf    8/30: dong well cango to snf on Sunday 8/31: dizziness is better, dc tomorrow       I spent 35 minutes in the professional and overall care of this patient.      Hans Batista, DO

## 2024-08-31 NOTE — CARE PLAN
The patient's goals for the shift include  comfort, sleep and safety    The clinical goals for the shift include pt will remain safe and free from falls

## 2024-09-01 VITALS
TEMPERATURE: 97.7 F | WEIGHT: 168.21 LBS | RESPIRATION RATE: 18 BRPM | DIASTOLIC BLOOD PRESSURE: 60 MMHG | OXYGEN SATURATION: 93 % | BODY MASS INDEX: 33.02 KG/M2 | HEART RATE: 77 BPM | SYSTOLIC BLOOD PRESSURE: 128 MMHG | HEIGHT: 60 IN

## 2024-09-01 PROCEDURE — 99238 HOSP IP/OBS DSCHRG MGMT 30/<: CPT | Performed by: STUDENT IN AN ORGANIZED HEALTH CARE EDUCATION/TRAINING PROGRAM

## 2024-09-01 PROCEDURE — 2500000004 HC RX 250 GENERAL PHARMACY W/ HCPCS (ALT 636 FOR OP/ED): Performed by: NURSE PRACTITIONER

## 2024-09-01 PROCEDURE — 2500000001 HC RX 250 WO HCPCS SELF ADMINISTERED DRUGS (ALT 637 FOR MEDICARE OP): Performed by: PHYSICIAN ASSISTANT

## 2024-09-01 RX ORDER — MECLIZINE HYDROCHLORIDE 25 MG/1
25 TABLET ORAL 3 TIMES DAILY PRN
Qty: 30 TABLET | Refills: 0 | Status: SHIPPED | OUTPATIENT
Start: 2024-09-01

## 2024-09-01 ASSESSMENT — COGNITIVE AND FUNCTIONAL STATUS - GENERAL
CLIMB 3 TO 5 STEPS WITH RAILING: A LOT
STANDING UP FROM CHAIR USING ARMS: A LITTLE
TOILETING: A LITTLE
DAILY ACTIVITIY SCORE: 20
DRESSING REGULAR LOWER BODY CLOTHING: A LITTLE
MOBILITY SCORE: 18
TURNING FROM BACK TO SIDE WHILE IN FLAT BAD: A LITTLE
HELP NEEDED FOR BATHING: A LITTLE
DRESSING REGULAR UPPER BODY CLOTHING: A LITTLE
WALKING IN HOSPITAL ROOM: A LITTLE
MOVING TO AND FROM BED TO CHAIR: A LITTLE

## 2024-09-01 ASSESSMENT — PAIN SCALES - GENERAL
PAINLEVEL_OUTOF10: 1
PAINLEVEL_OUTOF10: 3
PAINLEVEL_OUTOF10: 0 - NO PAIN

## 2024-09-01 ASSESSMENT — PAIN DESCRIPTION - LOCATION: LOCATION: SHOULDER

## 2024-09-01 ASSESSMENT — PAIN - FUNCTIONAL ASSESSMENT: PAIN_FUNCTIONAL_ASSESSMENT: 0-10

## 2024-09-01 ASSESSMENT — PAIN DESCRIPTION - ORIENTATION: ORIENTATION: LEFT

## 2024-09-01 NOTE — CARE PLAN
Problem: Fall/Injury  Goal: Not fall by end of shift  Outcome: Progressing  Goal: Be free from injury by end of the shift  Outcome: Progressing  Goal: Verbalize understanding of personal risk factors for fall in the hospital  Outcome: Progressing  Goal: Verbalize understanding of risk factor reduction measures to prevent injury from fall in the home  Outcome: Progressing  Goal: Use assistive devices by end of the shift  Outcome: Progressing  Goal: Pace activities to prevent fatigue by end of the shift  Outcome: Progressing     Problem: Pain - Adult  Goal: Verbalizes/displays adequate comfort level or baseline comfort level  Outcome: Progressing     Problem: Safety - Adult  Goal: Free from fall injury  Outcome: Progressing     Problem: Discharge Planning  Goal: Discharge to home or other facility with appropriate resources  Outcome: Progressing     Problem: Chronic Conditions and Co-morbidities  Goal: Patient's chronic conditions and co-morbidity symptoms are monitored and maintained or improved  Outcome: Progressing   The patient's goals for the shift include      The clinical goals for the shift include pt to remain injury free by the end of shift

## 2024-09-01 NOTE — CARE PLAN
The patient's goals for the shift include staying safe.    The clinical goals for the shift include pt to remain injury free by the end of shift

## 2024-09-01 NOTE — DISCHARGE SUMMARY
Discharge Diagnosis  Dizziness    Issues Requiring Follow-Up  dizziness    Test Results Pending At Discharge  Pending Labs       No current pending labs.            Hospital Course   Ayo is an 83-year-old Mohawk speaking female presenting to emergency department for evaluation of a fall.  Patient states that she stood up and felt dizzy and then fell backwards hitting her head, no loss of consciousness.  No anticoagulation however she does take a baby aspirin daily.  CT of the head/C-spine negative for acute process.  Patient complaining of right great toe pain, x-rays showing a mildly displaced first phalanx fracture.  Postop shoe applied.  Patient found to have a UTI, urine cultures pending, started on IV ceftriaxone and IV fluids.  CT angio of the head and neck ordered and pending.  Mildly elevated troponin of 19 with a repeat of 19, will continue to trend.  Patient admitted to telemetry observation for further medical management.     Dizziness/acute cystitis/elevated troponin/right great toe fracture  Admit to telemetry observation per Dr. Batista  See imaging results above  CT angio head and neck pending  Telemetry monitoring  Urine cultures pending  Continue IV ceftriaxone every 24 hours  IV Zofran as needed  Tylenol as needed  Continue IV fluids  Trend troponin  PT/OT  Postop shoe ordered  Repeat labs in a.m.     GERD/hypothyroidism/depression/hypertension/CAD/aortic stenosis/hyperlipidemia  #Chronic conditions  Telemetry monitoring  Cardiac diet  Nursing to complete home med rec     DVT Ppx  SCDs  Heparin subcutaneous  Out of bed with assistance     8/28: doing well, still dizzy, carotid US ordered, MRI of brain     8/29: doing ok [facility] low meclizine needs snf     8/30: dong well [facility]go to snf on Sunday 8/31: dizziness is better, dc tomorrow       Pertinent Physical Exam At Time of Discharge  Physical Exam  Constitutional:       Appearance: Normal appearance.   HENT:      Head: Normocephalic and  atraumatic.      Right Ear: Tympanic membrane and ear canal normal.      Left Ear: Tympanic membrane and ear canal normal.      Mouth/Throat:      Mouth: Mucous membranes are moist.      Pharynx: Oropharynx is clear.   Eyes:      Extraocular Movements: Extraocular movements intact.      Conjunctiva/sclera: Conjunctivae normal.      Pupils: Pupils are equal, round, and reactive to light.   Cardiovascular:      Rate and Rhythm: Normal rate and regular rhythm.      Pulses: Normal pulses.      Heart sounds: Normal heart sounds.   Pulmonary:      Effort: Pulmonary effort is normal.      Breath sounds: Normal breath sounds.   Abdominal:      General: Abdomen is flat. Bowel sounds are normal.      Palpations: Abdomen is soft.   Musculoskeletal:         General: Normal range of motion.      Cervical back: Normal range of motion and neck supple.   Skin:     General: Skin is warm and dry.      Capillary Refill: Capillary refill takes 2 to 3 seconds.   Neurological:      General: No focal deficit present.      Mental Status: She is alert and oriented to person, place, and time. Mental status is at baseline.   Psychiatric:         Mood and Affect: Mood normal.         Behavior: Behavior normal.         Thought Content: Thought content normal.         Judgment: Judgment normal.         Home Medications     Medication List      START taking these medications     meclizine 25 mg tablet; Commonly known as: Antivert; Take 1 tablet (25   mg) by mouth 3 times a day as needed for dizziness.     CONTINUE taking these medications     acetaminophen 325 mg tablet; Commonly known as: Tylenol   amoxicillin 500 mg capsule; Commonly known as: Amoxil; Take 4 caps (2000   mg) 30-60mins prior to your dental appointment   aspirin 81 mg EC tablet   busPIRone 10 mg tablet; Commonly known as: Buspar; Take 1 tablet (10 mg)   by mouth 2 times a day.   cholecalciferol 25 MCG (1000 UT) tablet; Commonly known as: Vitamin D-3   fluticasone 50 mcg/actuation  nasal spray; Commonly known as: Flonase   hydroCHLOROthiazide 25 mg tablet; Commonly known as: HYDRODiuril; Take 1   tablet (25 mg) by mouth once daily.   ipratropium 42 mcg (0.06 %) nasal spray; Commonly known as: Atrovent;   Administer 2 sprays into each nostril 3 times a day as needed for   rhinitis.   levothyroxine 50 mcg tablet; Commonly known as: Synthroid, Levoxyl; TAKE   ONE TABLET DAILY MONDAY THROUGH FRIDAY AND 2 TABLETS ON SATURDAY AND   SUNDAY   lovastatin 40 mg tablet; Commonly known as: Mevacor; TAKE 1 TABLET EVERY   DAY AT DINNER   omeprazole 20 mg DR capsule; Commonly known as: PriLOSEC; Take 1 capsule   (20 mg) by mouth once daily.   polyethylene glycol 17 gram/dose powder; Commonly known as: Glycolax,   Miralax   sertraline 100 mg tablet; Commonly known as: Zoloft       Outpatient Follow-Up  Future Appointments   Date Time Provider Department Center   9/26/2024 12:00 PM Fabiola Mccurdy MD [id number] [place]   1/30/2025 10:30 AM  [facility] [id number] CARD1 [id number] Krzysztof Batista DO

## 2024-09-01 NOTE — PROGRESS NOTES
09/01/24 1053   Discharge Planning   Home or Post Acute Services Post acute facilities (Rehab/SNF/etc)   Type of Post Acute Facility Services Skilled nursing   Expected Discharge Disposition SNF     Patient has written discharge order. Confirmed that [facility] can accept patient today. [facility] has arranged transport for 12PM to [facility]. Called and updated patients daughter on transport time. Bedside nurse updated and provided with report number.     Addendum 1115: Met with patient at bedside and updated on transport time of 12PM to [facility].

## 2024-09-03 ENCOUNTER — NURSING HOME VISIT (OUTPATIENT)
Dept: POST ACUTE CARE | Facility: EXTERNAL LOCATION | Age: 84
End: 2024-09-03
Payer: MEDICARE

## 2024-09-03 DIAGNOSIS — E03.8 HYPOTHYROIDISM DUE TO HASHIMOTO'S THYROIDITIS: ICD-10-CM

## 2024-09-03 DIAGNOSIS — E78.2 MIXED HYPERLIPIDEMIA: Chronic | ICD-10-CM

## 2024-09-03 DIAGNOSIS — F41.9 ANXIETY: ICD-10-CM

## 2024-09-03 DIAGNOSIS — I10 PRIMARY HYPERTENSION: Chronic | ICD-10-CM

## 2024-09-03 DIAGNOSIS — F34.1 PERSISTENT DEPRESSIVE DISORDER: ICD-10-CM

## 2024-09-03 DIAGNOSIS — R53.1 WEAKNESS: Primary | ICD-10-CM

## 2024-09-03 DIAGNOSIS — M25.512 CHRONIC LEFT SHOULDER PAIN: ICD-10-CM

## 2024-09-03 DIAGNOSIS — G89.29 CHRONIC LEFT SHOULDER PAIN: ICD-10-CM

## 2024-09-03 DIAGNOSIS — R42 DIZZINESS: ICD-10-CM

## 2024-09-03 DIAGNOSIS — I38 VHD (VALVULAR HEART DISEASE): ICD-10-CM

## 2024-09-03 DIAGNOSIS — E06.3 HYPOTHYROIDISM DUE TO HASHIMOTO'S THYROIDITIS: ICD-10-CM

## 2024-09-03 PROCEDURE — 99309 SBSQ NF CARE MODERATE MDM 30: CPT

## 2024-09-03 NOTE — PROGRESS NOTES
PROGRESS NOTE    Subjective   Chief complaint: Ayo Bueno is a 83 y.o. female who is an acute skilled patient being seen and evaluated for weakness    HPI:  HPI  Patient seen and examined at bedside today. Patient admitted to SNF for therapy due to weakness after recent hospitalization s/p fall at home. Patient has generalized weakness and requires assist with ADLs and transfers. Patient denies n/v/f/c and reports c/o left shoulder pain. Patient reports that the pain is chronic, however after she had fallen at home it was exacerbated.  Patient tells me she normally takes Tylenol for her shoulder pain.    Objective   Vital signs: 97 °F, 110/58, 64, 18, 97% room air.    Physical Exam  Vitals and nursing note reviewed.   Constitutional:       General: She is not in acute distress.     Appearance: Normal appearance. She is obese.   HENT:      Head: Normocephalic and atraumatic.      Right Ear: External ear normal.      Left Ear: External ear normal.      Nose: Nose normal.      Mouth/Throat:      Mouth: Mucous membranes are moist.      Pharynx: Oropharynx is clear.   Eyes:      Extraocular Movements: Extraocular movements intact.   Cardiovascular:      Rate and Rhythm: Normal rate and regular rhythm.      Pulses: Normal pulses.      Heart sounds: Normal heart sounds. No murmur heard.  Pulmonary:      Effort: Pulmonary effort is normal. No respiratory distress.      Breath sounds: Normal breath sounds. No wheezing.   Abdominal:      General: Bowel sounds are normal. There is no distension.      Palpations: Abdomen is soft.      Tenderness: There is no abdominal tenderness.   Musculoskeletal:         General: No swelling. Normal range of motion.      Cervical back: Normal range of motion and neck supple.   Skin:     General: Skin is warm and dry.      Findings: Bruising present.   Neurological:      General: No focal deficit present.      Mental Status: She is alert and oriented to person, place, and time.  Mental status is at baseline.   Psychiatric:         Mood and Affect: Mood normal.         Behavior: Behavior normal.         Thought Content: Thought content normal.         Judgment: Judgment normal.         Assessment/Plan   Problem List Items Addressed This Visit       Persistent depressive disorder     Stable  Sertraline           Hyperlipidemia (Chronic)     Lovastatin           Hypertension (Chronic)     Well-controlled, /58 today  Continue HCTZ  Continue to monitor BP         Hypothyroidism     Levothyroxine         VHD (valvular heart disease)     Aspirin           Dizziness     Meclizine  Monitor         Weakness - Primary     PT/OT         Anxiety     BuSpar           Chronic left shoulder pain     Tylenol  Voltaren gel            Medications, treatments, and labs reviewed  Continue medications and treatments as listed in EMR    Allegra Bailon, APRN-CNP

## 2024-09-03 NOTE — LETTER
Patient: Ayo Bueno  : 1940    Encounter Date: 2024    PROGRESS NOTE    Subjective  Chief complaint: Ayo Bueno is a 83 y.o. female who is an acute skilled patient being seen and evaluated for weakness    HPI:  HPI  Patient seen and examined at bedside today. Patient admitted to SNF for therapy due to weakness after recent hospitalization s/p fall at home. Patient has generalized weakness and requires assist with ADLs and transfers. Patient denies n/v/f/c and reports c/o left shoulder pain. Patient reports that the pain is chronic, however after she had fallen at home it was exacerbated.  Patient tells me she normally takes Tylenol for her shoulder pain.    Objective  Vital signs: 97 °F, 110/58, 64, 18, 97% room air.    Physical Exam  Vitals and nursing note reviewed.   Constitutional:       General: She is not in acute distress.     Appearance: Normal appearance. She is obese.   HENT:      Head: Normocephalic and atraumatic.      Right Ear: External ear normal.      Left Ear: External ear normal.      Nose: Nose normal.      Mouth/Throat:      Mouth: Mucous membranes are moist.      Pharynx: Oropharynx is clear.   Eyes:      Extraocular Movements: Extraocular movements intact.   Cardiovascular:      Rate and Rhythm: Normal rate and regular rhythm.      Pulses: Normal pulses.      Heart sounds: Normal heart sounds. No murmur heard.  Pulmonary:      Effort: Pulmonary effort is normal. No respiratory distress.      Breath sounds: Normal breath sounds. No wheezing.   Abdominal:      General: Bowel sounds are normal. There is no distension.      Palpations: Abdomen is soft.      Tenderness: There is no abdominal tenderness.   Musculoskeletal:         General: No swelling. Normal range of motion.      Cervical back: Normal range of motion and neck supple.   Skin:     General: Skin is warm and dry.      Findings: Bruising present.   Neurological:      General: No focal deficit present.       Mental Status: She is alert and oriented to person, place, and time. Mental status is at baseline.   Psychiatric:         Mood and Affect: Mood normal.         Behavior: Behavior normal.         Thought Content: Thought content normal.         Judgment: Judgment normal.         Assessment/Plan  Problem List Items Addressed This Visit       Persistent depressive disorder     Stable  Sertraline           Hyperlipidemia (Chronic)     Lovastatin           Hypertension (Chronic)     Well-controlled, /58 today  Continue HCTZ  Continue to monitor BP         Hypothyroidism     Levothyroxine         VHD (valvular heart disease)     Aspirin           Dizziness     Meclizine  Monitor         Weakness - Primary     PT/OT         Anxiety     BuSpar           Chronic left shoulder pain     Tylenol  Voltaren gel            Medications, treatments, and labs reviewed  Continue medications and treatments as listed in EMR    ANATOLIY Polo      Electronically Signed By: ANATOLIY Polo   9/3/24  2:46 PM

## 2024-09-04 ENCOUNTER — NURSING HOME VISIT (OUTPATIENT)
Dept: POST ACUTE CARE | Facility: EXTERNAL LOCATION | Age: 84
End: 2024-09-04
Payer: MEDICARE

## 2024-09-04 DIAGNOSIS — R07.89 OTHER CHEST PAIN: ICD-10-CM

## 2024-09-04 DIAGNOSIS — R53.1 WEAKNESS: Primary | ICD-10-CM

## 2024-09-04 DIAGNOSIS — H61.22 IMPACTED CERUMEN OF LEFT EAR: ICD-10-CM

## 2024-09-04 DIAGNOSIS — M25.512 CHRONIC LEFT SHOULDER PAIN: ICD-10-CM

## 2024-09-04 DIAGNOSIS — H04.123 DRY EYES: ICD-10-CM

## 2024-09-04 DIAGNOSIS — G89.29 CHRONIC LEFT SHOULDER PAIN: ICD-10-CM

## 2024-09-04 DIAGNOSIS — I10 PRIMARY HYPERTENSION: Chronic | ICD-10-CM

## 2024-09-04 PROCEDURE — 99309 SBSQ NF CARE MODERATE MDM 30: CPT

## 2024-09-04 NOTE — LETTER
Patient: Ayo Bueno  : 1940    Encounter Date: 2024    PROGRESS NOTE    Subjective  Chief complaint: Ayo Bueno is a 83 y.o. female who is an acute skilled patient being seen and evaluated for weakness    HPI:  HPI  Patient seen and examined at bedside today. Patient in therapy due to generalized weakness. Continues to work toward goals in therapy. Patient complains of having some pain at that was midsternal last evening that radiated to her back. Patient reports that this pain was exacerbated by her moving or taking deep breaths.  Patient reports the pain was radiated with diet.  I was able to reproduce the pain by pushing on the patient's mid to lower back, pain is likely musculoskeletal.  Will obtain EKG to rule out any cardiac issues.  Patient also reporting that she has some wax buildup in her left ear.  Referral to ENT as well as Debrox eardrops.  No concerns per nursing to report, no other acute events overnight.      Objective  Vital signs: 97 °F, 110/58, 64, 18, 97% room air.    Physical Exam  Vitals and nursing note reviewed.   Constitutional:       General: She is not in acute distress.     Appearance: Normal appearance. She is obese.   HENT:      Head: Normocephalic and atraumatic.      Right Ear: External ear normal.      Left Ear: External ear normal.      Nose: Nose normal.      Mouth/Throat:      Mouth: Mucous membranes are moist.      Pharynx: Oropharynx is clear.   Eyes:      Extraocular Movements: Extraocular movements intact.   Cardiovascular:      Rate and Rhythm: Normal rate and regular rhythm.      Pulses: Normal pulses.      Heart sounds: Normal heart sounds. No murmur heard.  Pulmonary:      Effort: Pulmonary effort is normal. No respiratory distress.      Breath sounds: Normal breath sounds. No wheezing.   Abdominal:      General: Bowel sounds are normal. There is no distension.      Palpations: Abdomen is soft.      Tenderness: There is no abdominal  tenderness.   Musculoskeletal:         General: No swelling. Normal range of motion.      Cervical back: Normal range of motion and neck supple.   Skin:     General: Skin is warm and dry.      Findings: Bruising present.   Neurological:      General: No focal deficit present.      Mental Status: She is alert and oriented to person, place, and time. Mental status is at baseline.   Psychiatric:         Mood and Affect: Mood normal.         Behavior: Behavior normal.         Thought Content: Thought content normal.         Judgment: Judgment normal.         Assessment/Plan  Problem List Items Addressed This Visit       Hypertension (Chronic)     Stable  Continue HCTZ  Continue to monitor BP         Weakness - Primary     PT/OT         Chronic left shoulder pain     Tylenol  Voltaren gel           Impacted cerumen of left ear     Debrox eardrops  ENT referral         Dry eyes     Refresh eyedrops         Other chest pain     Patient reported short episode of chest pain last night  Pain appears to be musculoskeletal in nature and was reproducible  EKG ordered and negative for acute findings, normal sinus rhythm with bundle branch block  Monitor  Maintain cardiology follow-up            Medications, treatments, and labs reviewed  Continue medications and treatments as listed in EMR    ANATOLIY Polo      Electronically Signed By: ANATOLIY Polo   9/5/24  8:55 AM

## 2024-09-05 PROBLEM — H61.22 IMPACTED CERUMEN OF LEFT EAR: Status: ACTIVE | Noted: 2024-09-05

## 2024-09-05 PROBLEM — H04.123 DRY EYES: Status: ACTIVE | Noted: 2024-09-05

## 2024-09-05 PROBLEM — R07.89 OTHER CHEST PAIN: Status: ACTIVE | Noted: 2024-09-05

## 2024-09-05 NOTE — PROGRESS NOTES
PROGRESS NOTE    Subjective   Chief complaint: Ayo Bueno is a 83 y.o. female who is an acute skilled patient being seen and evaluated for weakness    HPI:  HPI  Patient seen and examined at bedside today. Patient in therapy due to generalized weakness. Continues to work toward goals in therapy. Patient complains of having some pain at that was midsternal last evening that radiated to her back. Patient reports that this pain was exacerbated by her moving or taking deep breaths.  Patient reports the pain was radiated with diet.  I was able to reproduce the pain by pushing on the patient's mid to lower back, pain is likely musculoskeletal.  Will obtain EKG to rule out any cardiac issues.  Patient also reporting that she has some wax buildup in her left ear.  Referral to ENT as well as Debrox eardrops.  No concerns per nursing to report, no other acute events overnight.      Objective   Vital signs: 97 °F, 110/58, 64, 18, 97% room air.    Physical Exam  Vitals and nursing note reviewed.   Constitutional:       General: She is not in acute distress.     Appearance: Normal appearance. She is obese.   HENT:      Head: Normocephalic and atraumatic.      Right Ear: External ear normal.      Left Ear: External ear normal.      Nose: Nose normal.      Mouth/Throat:      Mouth: Mucous membranes are moist.      Pharynx: Oropharynx is clear.   Eyes:      Extraocular Movements: Extraocular movements intact.   Cardiovascular:      Rate and Rhythm: Normal rate and regular rhythm.      Pulses: Normal pulses.      Heart sounds: Normal heart sounds. No murmur heard.  Pulmonary:      Effort: Pulmonary effort is normal. No respiratory distress.      Breath sounds: Normal breath sounds. No wheezing.   Abdominal:      General: Bowel sounds are normal. There is no distension.      Palpations: Abdomen is soft.      Tenderness: There is no abdominal tenderness.   Musculoskeletal:         General: No swelling. Normal range of  motion.      Cervical back: Normal range of motion and neck supple.   Skin:     General: Skin is warm and dry.      Findings: Bruising present.   Neurological:      General: No focal deficit present.      Mental Status: She is alert and oriented to person, place, and time. Mental status is at baseline.   Psychiatric:         Mood and Affect: Mood normal.         Behavior: Behavior normal.         Thought Content: Thought content normal.         Judgment: Judgment normal.         Assessment/Plan   Problem List Items Addressed This Visit       Hypertension (Chronic)     Stable  Continue HCTZ  Continue to monitor BP         Weakness - Primary     PT/OT         Chronic left shoulder pain     Tylenol  Voltaren gel           Impacted cerumen of left ear     Debrox eardrops  ENT referral         Dry eyes     Refresh eyedrops         Other chest pain     Patient reported short episode of chest pain last night  Pain appears to be musculoskeletal in nature and was reproducible  EKG ordered and negative for acute findings, normal sinus rhythm with bundle branch block  Monitor  Maintain cardiology follow-up            Medications, treatments, and labs reviewed  Continue medications and treatments as listed in EMR    Allegra Bailon, APRN-CNP

## 2024-09-05 NOTE — ASSESSMENT & PLAN NOTE
Patient reported short episode of chest pain last night  Pain appears to be musculoskeletal in nature and was reproducible  EKG ordered and negative for acute findings, normal sinus rhythm with bundle branch block  Monitor  Maintain cardiology follow-up

## 2024-09-21 PROCEDURE — G0180 MD CERTIFICATION HHA PATIENT: HCPCS | Performed by: FAMILY MEDICINE

## 2024-09-23 ENCOUNTER — DOCUMENTATION (OUTPATIENT)
Dept: PRIMARY CARE | Facility: CLINIC | Age: 84
End: 2024-09-23
Payer: MEDICARE

## 2024-09-23 ENCOUNTER — PATIENT OUTREACH (OUTPATIENT)
Dept: PRIMARY CARE | Facility: CLINIC | Age: 84
End: 2024-09-23
Payer: MEDICARE

## 2024-09-23 NOTE — PROGRESS NOTES
Discharge Facility:[facility]  Discharge Diagnosis:  Nondisplaced Fx proximal phalanx R Great Toe  Fall  UTI  Admission Date:9/1/2024  Discharge Date: 9/20/2024    PCP Appointment Date:10/1/2024  Specialist Appointment Date: 9/26/2024 Cardio/Kaz  Hospital Encounter and Summary Linked: Yes  PMC: 8/27/2024-9/1/2024  See discharge assessment below for further details  Engagement  Call Start Time: 1603 (9/23/2024  5:43 PM)    Medications  Medications reviewed with patient/caregiver?: Yes (9/23/2024  5:43 PM)  Is the patient having any side effects they believe may be caused by any medication additions or changes?: No (9/23/2024  5:43 PM)  Does the patient have all medications ordered at discharge?: Yes (9/23/2024  5:43 PM)  Care Management Interventions: No intervention needed (9/23/2024  5:43 PM)  Is the patient taking all medications as directed (includes completed medication regime)?: Yes (9/23/2024  5:43 PM)    Appointments  Does the patient have a primary care provider?: Yes (9/23/2024  5:43 PM)  Care Management Interventions: Verified appointment date/time/provider (10/1/2024) (9/23/2024  5:43 PM)  Has the patient kept scheduled appointments due by today?: Yes (9/23/2024  5:43 PM)    Self Management  What is the home health agency?: -- (PT,OT) (9/23/2024  5:43 PM)  Has home health visited the patient within 72 hours of discharge?: Call prior to 72 hours (9/23/2024  5:43 PM)  What Durable Medical Equipment (DME) was ordered?: -- (Walker) (9/23/2024  5:43 PM)  Has all Durable Medical Equipment (DME) been delivered?: Yes (9/23/2024  5:43 PM)    Patient Teaching  Does the patient have access to their discharge instructions?: Yes (9/23/2024  5:43 PM)  What is the patient's perception of their health status since discharge?: Improving (9/23/2024  5:43 PM)  Is the patient/caregiver able to teach back the hierarchy of who to call/visit for symptoms/problems? PCP, Specialist, Home Health nurse, Urgent Care, ED,  911: Yes (9/23/2024  5:43 PM)    Wrap Up  Wrap Up Additional Comments: -- (Spoke with daughter Elham. She mentions that Stew has some dizziness, the medication should help. We discussed to have her stay hydrated. She is navigating fairly well with her walker.) (9/23/2024  5:43 PM)

## 2024-09-25 PROBLEM — H04.123 DRY EYES: Status: RESOLVED | Noted: 2024-09-05 | Resolved: 2024-09-25

## 2024-09-25 PROBLEM — H61.22 IMPACTED CERUMEN OF LEFT EAR: Status: RESOLVED | Noted: 2024-09-05 | Resolved: 2024-09-25

## 2024-09-26 ENCOUNTER — APPOINTMENT (OUTPATIENT)
Dept: CARDIOLOGY | Facility: CLINIC | Age: 84
End: 2024-09-26
Payer: MEDICARE

## 2024-09-26 VITALS
SYSTOLIC BLOOD PRESSURE: 110 MMHG | HEART RATE: 88 BPM | BODY MASS INDEX: 32.61 KG/M2 | WEIGHT: 167 LBS | DIASTOLIC BLOOD PRESSURE: 74 MMHG | OXYGEN SATURATION: 94 %

## 2024-09-26 DIAGNOSIS — I44.1 AV BLOCK, MOBITZ II: ICD-10-CM

## 2024-09-26 DIAGNOSIS — I44.2 TRANSIENT COMPLETE HEART BLOCK: Chronic | ICD-10-CM

## 2024-09-26 DIAGNOSIS — Z95.2 S/P TAVR (TRANSCATHETER AORTIC VALVE REPLACEMENT): Chronic | ICD-10-CM

## 2024-09-26 DIAGNOSIS — E78.2 MIXED HYPERLIPIDEMIA: Primary | Chronic | ICD-10-CM

## 2024-09-26 DIAGNOSIS — Z09 HOSPITAL DISCHARGE FOLLOW-UP: Chronic | ICD-10-CM

## 2024-09-26 DIAGNOSIS — I10 HYPERTENSION, UNSPECIFIED TYPE: ICD-10-CM

## 2024-09-26 DIAGNOSIS — E78.5 HYPERLIPIDEMIA, UNSPECIFIED HYPERLIPIDEMIA TYPE: ICD-10-CM

## 2024-09-26 DIAGNOSIS — I10 PRIMARY HYPERTENSION: Chronic | ICD-10-CM

## 2024-09-26 DIAGNOSIS — I34.2 NONRHEUMATIC MITRAL VALVE STENOSIS: Chronic | ICD-10-CM

## 2024-09-26 PROCEDURE — 1159F MED LIST DOCD IN RCRD: CPT | Performed by: INTERNAL MEDICINE

## 2024-09-26 PROCEDURE — 1160F RVW MEDS BY RX/DR IN RCRD: CPT | Performed by: INTERNAL MEDICINE

## 2024-09-26 PROCEDURE — 1036F TOBACCO NON-USER: CPT | Performed by: INTERNAL MEDICINE

## 2024-09-26 PROCEDURE — 3078F DIAST BP <80 MM HG: CPT | Performed by: INTERNAL MEDICINE

## 2024-09-26 PROCEDURE — 3074F SYST BP LT 130 MM HG: CPT | Performed by: INTERNAL MEDICINE

## 2024-09-26 PROCEDURE — 99214 OFFICE O/P EST MOD 30 MIN: CPT | Performed by: INTERNAL MEDICINE

## 2024-09-26 PROCEDURE — 1111F DSCHRG MED/CURRENT MED MERGE: CPT | Performed by: INTERNAL MEDICINE

## 2024-09-26 PROCEDURE — 1157F ADVNC CARE PLAN IN RCRD: CPT | Performed by: INTERNAL MEDICINE

## 2024-09-26 RX ORDER — HYDROCHLOROTHIAZIDE 25 MG/1
12.5 TABLET ORAL DAILY
Qty: 45 TABLET | Refills: 3 | Status: SHIPPED | OUTPATIENT
Start: 2024-09-26 | End: 2025-09-26

## 2024-09-26 NOTE — PROGRESS NOTES
Referred by No ref. provider found    EDGARSimran seeing the pt for a 6 month follow up. She had a fall due to dizziness. She hit her head. Kept her in the hosp then went to [facility].  ? Vertigo.     Past Medical History:  Problem List Items Addressed This Visit    None     Past Medical History:   Diagnosis Date    Aortic stenosis 11/15/2023    moderate    Benign neoplasm, unspecified site     Adenoma    Corns and callosities     Pre-ulcerative corn or callous    Hospital discharge follow-up 03/19/2024    Hyperlipidemia 02/23/2023    Hypertension 02/23/2023    Impingement syndrome of unspecified shoulder     Shoulder impingement syndrome    Mitral stenosis 11/15/2023    Personal history of other diseases of the nervous system and sense organs     History of carpal tunnel syndrome    Personal history of other diseases of urinary system     History of hematuria    Personal history of other endocrine, nutritional and metabolic disease     History of hypokalemia    Personal history of other endocrine, nutritional and metabolic disease     History of obesity    Personal history of other endocrine, nutritional and metabolic disease     History of hyperlipidemia    Personal history of other endocrine, nutritional and metabolic disease     History of hypothyroidism    Personal history of other specified conditions     History of nausea    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    Shortness of breath 11/15/2023    Transient complete heart block (Multi) 03/01/2024    Unspecified tear of unspecified meniscus, current injury, left knee, initial encounter     Tear of cartilage of left knee      Past Surgical History:  She has a past surgical history that includes Other surgical history (07/12/2018); Appendectomy (07/12/2018); Knee surgery (09/19/2018); Cardiac catheterization (N/A, 1/16/2024); Cardiac catheterization (N/A, 1/30/2024); Cardiac catheterization (N/A, 1/30/2024); Cardiac catheterization (N/A,  1/30/2024); and Anomalous pulmonary venous return repair, total (N/A, 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      Social History:  She reports that she has never smoked. She has never been exposed to tobacco smoke. She has never used smokeless tobacco. She reports current alcohol use. She reports that she does not use drugs.    Family History:  Family History   Problem Relation Name Age of Onset    Tuberculosis Mother      Stroke Sister      Coronary artery disease Sister      Diabetes Brother       Allergies:  Aspirin, Penicillins, and Sulfa (sulfonamide antibiotics)    Outpatient Medications:  Current Outpatient Medications   Medication Instructions    acetaminophen (Tylenol) 325 mg tablet oral, Every 6 hours, 2 tab(s) orally every 6 hours-continue routinely around the clock for next 72 hours then decrease to as needed for mild pain or temperature    amoxicillin (Amoxil) 500 mg capsule Take 4 caps (2000 mg) 30-60mins prior to your dental appointment    aspirin 81 mg, oral, Daily    busPIRone (BUSPAR) 10 mg, oral, 2 times daily    cholecalciferol (VITAMIN D-3) 25 mcg, oral, Daily    fluticasone (Flonase) 50 mcg/actuation nasal spray 1 spray, Each Nostril, Daily PRN    hydroCHLOROthiazide (HYDRODIURIL) 25 mg, oral, Daily    ipratropium (Atrovent) 42 mcg (0.06 %) nasal spray 2 sprays, Each Nostril, 3 times daily PRN    levothyroxine (Synthroid, Levoxyl) 50 mcg tablet TAKE ONE TABLET DAILY MONDAY THROUGH FRIDAY AND 2 TABLETS ON SATURDAY AND SUNDAY    lovastatin (Mevacor) 40 mg tablet TAKE 1 TABLET EVERY DAY AT DINNER    meclizine (ANTIVERT) 25 mg, oral, 3 times daily PRN    omeprazole (PRILOSEC) 20 mg, oral, Daily    polyethylene glycol (GLYCOLAX, MIRALAX) 17 g, oral, Daily PRN    sertraline (ZOLOFT) 100 mg, oral, Nightly     Last Recorded Vitals:  There were no vitals filed for this visit.    Physical Exam  Patient is alert and oriented x3.  HEENT is unremarkable mucous members are moist  Neck no JVP no bruits upstrokes are full no  thyromegaly  Lungs are clear bilaterally.  No wheezing crackles or rales  Heart regular rhythm normal S1-S2 there is no S3 soft systolic ejection murmur abdomen is soft bs are positive nontender nondistended no organomegaly no pulsatile masses  Extremities have no edema.  Distal pulses present palpable.  Neuro is grossly nonfocal  Skin has no rashes     Last Labs:  CBC -  Lab Results   Component Value Date    WBC 4.2 (L) 08/28/2024    HGB 11.6 (L) 08/28/2024    HCT 34.0 (L) 08/28/2024    MCV 88 08/28/2024     (L) 08/28/2024     CMP -  Lab Results   Component Value Date    CALCIUM 9.1 08/28/2024    PHOS 4.6 01/26/2024    PROT 7.4 08/27/2024    ALBUMIN 4.1 08/27/2024    AST 20 08/27/2024    ALT 12 08/27/2024    ALKPHOS 51 08/27/2024    BILITOT 0.6 08/27/2024     LIPID PANEL -   Lab Results   Component Value Date    CHOL 131 01/30/2024    HDL 51.1 01/30/2024    CHHDL 2.6 01/30/2024    VLDL 12 01/30/2024    TRIG 59 01/30/2024    NHDL 80 01/30/2024     RENAL FUNCTION PANEL -   Lab Results   Component Value Date    K 3.5 08/28/2024    PHOS 4.6 01/26/2024     Lab Results   Component Value Date    BNP 96 08/27/2024    HGBA1C 5.9 (H) 07/18/2024     Procedure    Carotid duplex 8/28/24 50% BL    Holter 3/13/2024    Echo 2/26/2024 EF 65%, DDF, LAE, LVOT with mild MS, well-seated #29 evolute TAVR peak gradient 16 mm    Holter 1/31/2024 3-second pause    TAVR 1/30/2024 Evolut fracture 29 mm complicated with transient complete heart block    Cardiac catheterization 1/16/2024 luminal irregularities    Echo 9/2023 EF 60%, Mild MS with mild/mod MR, Severe A S A V max 4.9m/s 95/55mmHg with mild to mod A I    ECHO [04/22/2021]: EF 60-65%. SD = impaired relaxation pattern. MV mod thickened. Mild mitral stenosis. Mod MAC. Mod AS (AVmax 3 M/s, mean gradient 20 mmHg, DI 0.36, NEHEMIAH 1 cm2). Mild AR.          Assessment/Plan   1. Aortic stenosis. 1/30/2024 status post TAVR with a number 29 mm evolute valve.  Doing great.  Symptoms have  improved.  Less shortness of breath and improved energy.  Valve is well-seated on the last echo February 2024.    2. Vertigo- admitted 9/4/24. Not related bradycardia.      3. Shortness of breath.  Substantially improved with a TAVR    4. Hypertension. Well-controlled.  Decrease hydrochlorothiazide to 12.5 daily as she is urinating frequently.  I do not think this have an impact on nocturnal urination.     5. Hyperlipidemia. Followed Dr. Allen.  1/31/2024 LDL 68 HDL 51 triglycerides 59    6. MS Mild.  At this age I would leave this alone    7.  Bradycardia.  Transient complete heart block after the TAVR requiring temporary transvenous pacing.  Follow-up Holter nothing obvious found.  Seen by Dr. Ramicone 3/12/2024. A repeat 7 day holter done by 3/13/24.dizziness currently not related to bradycardia.       I've reviewed the hosp records. RTC 6 months       Fabiola Mccurdy MD     Instructions and follow up

## 2024-09-26 NOTE — PATIENT INSTRUCTIONS
1. Aortic stenosis. 1/30/2024 status post TAVR with a number 29 mm evolute valve.  Doing great.  Symptoms have improved.  Less shortness of breath and improved energy.  Valve is well-seated on the last echo February 2024.    2. Vertigo- admitted 9/4/24. Not related bradycardia.      3. Shortness of breath.  Substantially improved with a TAVR    4. Hypertension. Well-controlled.  Decrease hydrochlorothiazide to 12.5 daily as she is urinating frequently.  I do not think this have an impact on nocturnal urination.     5. Hyperlipidemia. Followed Dr. Allen.  1/31/2024 LDL 68 HDL 51 triglycerides 59    6. MS Mild.  At this age I would leave this alone    7.  Bradycardia.  Transient complete heart block after the TAVR requiring temporary transvenous pacing.  Follow-up Holter nothing obvious found.  Seen by Dr. Ramicone 3/12/2024. A repeat 7 day holter done by 3/13/24.dizziness currently not related to bradycardia.       I've reviewed the hosp records. RTC 6 months

## 2024-10-01 ENCOUNTER — APPOINTMENT (OUTPATIENT)
Dept: PRIMARY CARE | Facility: CLINIC | Age: 84
End: 2024-10-01
Payer: MEDICARE

## 2024-10-01 VITALS
WEIGHT: 170 LBS | DIASTOLIC BLOOD PRESSURE: 70 MMHG | HEART RATE: 78 BPM | HEIGHT: 60 IN | OXYGEN SATURATION: 92 % | SYSTOLIC BLOOD PRESSURE: 126 MMHG | BODY MASS INDEX: 33.38 KG/M2

## 2024-10-01 DIAGNOSIS — R73.9 HYPERGLYCEMIA: ICD-10-CM

## 2024-10-01 DIAGNOSIS — R42 DIZZINESS: ICD-10-CM

## 2024-10-01 DIAGNOSIS — H61.22 IMPACTED CERUMEN, LEFT EAR: ICD-10-CM

## 2024-10-01 DIAGNOSIS — R26.81 UNSTEADY GAIT: ICD-10-CM
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
## 2024-10-01 LAB — HBA1C MFR BLD: 5.9 % (ref 4.2–6.5)

## 2024-10-01 PROCEDURE — 1159F MED LIST DOCD IN RCRD: CPT | Performed by: FAMILY MEDICINE

## 2024-10-01 PROCEDURE — 90662 IIV NO PRSV INCREASED AG IM: CPT | Performed by: FAMILY MEDICINE

## 2024-10-01 PROCEDURE — 1111F DSCHRG MED/CURRENT MED MERGE: CPT | Performed by: FAMILY MEDICINE

## 2024-10-01 PROCEDURE — 1124F ACP DISCUSS-NO DSCNMKR DOCD: CPT | Performed by: FAMILY MEDICINE

## 2024-10-01 PROCEDURE — 1125F AMNT PAIN NOTED PAIN PRSNT: CPT | Performed by: FAMILY MEDICINE

## 2024-10-01 PROCEDURE — 99214 OFFICE O/P EST MOD 30 MIN: CPT | Performed by: FAMILY MEDICINE

## 2024-10-01 PROCEDURE — 69209 REMOVE IMPACTED EAR WAX UNI: CPT | Performed by: FAMILY MEDICINE

## 2024-10-01 PROCEDURE — 1157F ADVNC CARE PLAN IN RCRD: CPT | Performed by: FAMILY MEDICINE

## 2024-10-01 PROCEDURE — 1036F TOBACCO NON-USER: CPT | Performed by: FAMILY MEDICINE

## 2024-10-01 PROCEDURE — 83036 HEMOGLOBIN GLYCOSYLATED A1C: CPT | Mod: CLIA WAIVED TEST | Performed by: FAMILY MEDICINE

## 2024-10-01 PROCEDURE — G0008 ADMIN INFLUENZA VIRUS VAC: HCPCS | Performed by: FAMILY MEDICINE

## 2024-10-01 PROCEDURE — 3074F SYST BP LT 130 MM HG: CPT | Performed by: FAMILY MEDICINE

## 2024-10-01 PROCEDURE — 3078F DIAST BP <80 MM HG: CPT | Performed by: FAMILY MEDICINE

## 2024-10-01 ASSESSMENT — PAIN SCALES - GENERAL: PAINLEVEL: 6

## 2024-10-01 ASSESSMENT — ENCOUNTER SYMPTOMS: DEPRESSION: 0

## 2024-10-01 NOTE — PROGRESS NOTES
Subjective   Patient ID: Ayo Bueno is a 83 y.o. female who presents for Hospital Follow-up (Fell and broke her toe. She was dizzy and had a uti.) and Flu Vaccine.    HPI   Patient here for follow-up after a fall.  She is accompanied by her daughter.  She was getting up off of the floor after sleeping there, which is her normal routine, and when she got up she got dizzy and fell.  She also has had general dizzy feeling of the sensation of someone pushing her forward.  She is getting physical therapy at home.  Review of Systems    Objective   /70 (BP Location: Left arm, Patient Position: Sitting, BP Cuff Size: Adult)   Pulse 78   Ht 1.524 m (5')   Wt 77.1 kg (170 lb)   SpO2 92%   BMI 33.20 kg/m²     Physical Exam  Alert, pleasant and in no acute distress.  Heart: Regular rate and rhythm without murmur  Lungs: Clear to auscultation  Lower extremities: No edema  Ears: Right ear canal and tympanic membranes normal.  Left canal cerumen impacted    Procedure: After informed verbal consent, left ear lavaged with warm water.  Large piece of cerumen that appeared to contain the cotton from a Q-tip was removed.  Ear canal normal-appearing.  Tympanic membranes pearly gray and intact.  Assessment/Plan   Problem List Items Addressed This Visit             ICD-10-CM    Unsteady gait R26.81    Dizziness R42     Other Visit Diagnoses         Codes    Fall, sequela    -  Primary W19.XXXS    Impacted cerumen, left ear     H61.22    Hyperglycemia     R73.9    Relevant Orders    POCT Glycosylated Hemoglobin (HGB A1C) docked device        Patient here for follow-up after hospital discharge a month ago.  Hospital records reviewed she is getting physical therapy but still having unsteady feelings.  Encouraged continued physical therapy.  Recommend having them focus on balance and dizziness.  Ear care discussed.  Avoidance of digging in the ear with a Q-tip discussed.  Hemoglobin A1c good at 5.9  Flu vaccine  provided  Follow-up 4 months

## 2024-10-04 ENCOUNTER — PATIENT OUTREACH (OUTPATIENT)
Dept: PRIMARY CARE | Facility: CLINIC | Age: 84
End: 2024-10-04
Payer: MEDICARE

## 2024-10-04 NOTE — PROGRESS NOTES
Call regarding appt. with PCP on 10/1/2024 after hospitalization.  At time of outreach call the patient feels as if their condition has improved since last visit.  Reviewed the PCP appointment with the pt and addressed any questions or concerns.  Spoke to daughter Ivette briefly, she states Ayo is doing ok.

## 2024-11-04 ENCOUNTER — PATIENT OUTREACH (OUTPATIENT)
Dept: PRIMARY CARE | Facility: CLINIC | Age: 84
End: 2024-11-04
Payer: MEDICARE

## 2024-11-21 NOTE — PROGRESS NOTES
PROGRESS NOTE    Subjective   Chief complaint: Ayo Bueno is a 84 y.o. female who is an acute skilled patient being seen and evaluated for weakness    HPI:  Patient continues to work towards her goals in therapy.  No acute issues.          Objective   Vital signs:   Vitals within range    Physical Exam  Constitutional:       General: She is not in acute distress.  Eyes:      Extraocular Movements: Extraocular movements intact.   Pulmonary:      Effort: Pulmonary effort is normal.   Musculoskeletal:      Cervical back: Neck supple.   Neurological:      Mental Status: She is alert.   Psychiatric:         Mood and Affect: Mood normal.         Behavior: Behavior is cooperative.         Assessment/Plan   Problem List Items Addressed This Visit       Hyperlipidemia (Chronic)     Continue current medications         Hypertension (Chronic)     Continue current medications         Hypothyroidism     Continue current medications         Anxiety - Primary     Continue current medications          Medications, treatments, and labs reviewed  Continue medications and treatments as listed in EMR    Alec Barker DO    Scribe Attestation  Nila GUIDO   attest that this documentation has been prepared under the direction and in the presence of Alec Barker DO    Provider Attestation - Scribe documentation  All medical record entries made by the Scribe were at my direction and personally dictated by me. I have reviewed the chart and agree that the record accurately reflects my personal performance of the history, physical exam, discussion and plan.

## 2024-12-04 DIAGNOSIS — K21.9 GASTROESOPHAGEAL REFLUX DISEASE, UNSPECIFIED WHETHER ESOPHAGITIS PRESENT: ICD-10-CM

## 2024-12-04 RX ORDER — OMEPRAZOLE 20 MG/1
20 CAPSULE, DELAYED RELEASE ORAL DAILY
Qty: 90 CAPSULE | Refills: 1 | Status: SHIPPED | OUTPATIENT
Start: 2024-12-04

## 2024-12-20 ENCOUNTER — PATIENT OUTREACH (OUTPATIENT)
Dept: PRIMARY CARE | Facility: CLINIC | Age: 84
End: 2024-12-20
Payer: MEDICARE

## 2024-12-20 NOTE — PROGRESS NOTES
Call after hospitalization.  At time of outreach call the patient feels as if their condition has improved since last visit.  Spoke with daughter, Elham, she states Ayo is doing better but now with other hip pain.

## 2025-01-28 ENCOUNTER — APPOINTMENT (OUTPATIENT)
Dept: PRIMARY CARE | Facility: CLINIC | Age: 85
End: 2025-01-28
Payer: MEDICARE

## 2025-01-28 ENCOUNTER — TELEPHONE (OUTPATIENT)
Dept: PRIMARY CARE | Facility: CLINIC | Age: 85
End: 2025-01-28

## 2025-01-28 VITALS
BODY MASS INDEX: 32.98 KG/M2 | WEIGHT: 168 LBS | DIASTOLIC BLOOD PRESSURE: 76 MMHG | HEIGHT: 60 IN | HEART RATE: 78 BPM | OXYGEN SATURATION: 94 % | SYSTOLIC BLOOD PRESSURE: 114 MMHG | TEMPERATURE: 97.7 F

## 2025-01-28 DIAGNOSIS — R35.0 URINE FREQUENCY: ICD-10-CM

## 2025-01-28 DIAGNOSIS — N30.00 ACUTE CYSTITIS WITHOUT HEMATURIA: Primary | ICD-10-CM

## 2025-01-28 LAB
APPEARANCE UR: CLEAR
BILIRUB UR QL STRIP: NEGATIVE
COLOR UR: YELLOW
GLUCOSE UR STRIP-MCNC: NEGATIVE MG/DL
HGB UR QL STRIP: NEGATIVE
KETONES UR STRIP-MCNC: NEGATIVE MG/DL
LEUKOCYTE ESTERASE UR QL STRIP: ABNORMAL
NITRITE UR QL STRIP: POSITIVE
PH UR STRIP: 7 [PH]
PROT UR STRIP-MCNC: ABNORMAL MG/DL
SP GR UR STRIP.AUTO: 1.02
UROBILINOGEN UR STRIP-ACNC: 2 E.U./DL

## 2025-01-28 PROCEDURE — 1158F ADVNC CARE PLAN TLK DOCD: CPT | Performed by: NURSE PRACTITIONER

## 2025-01-28 PROCEDURE — 1036F TOBACCO NON-USER: CPT | Performed by: NURSE PRACTITIONER

## 2025-01-28 PROCEDURE — 81003 URINALYSIS AUTO W/O SCOPE: CPT | Performed by: NURSE PRACTITIONER

## 2025-01-28 PROCEDURE — 1123F ACP DISCUSS/DSCN MKR DOCD: CPT | Performed by: NURSE PRACTITIONER

## 2025-01-28 PROCEDURE — 99214 OFFICE O/P EST MOD 30 MIN: CPT | Performed by: NURSE PRACTITIONER

## 2025-01-28 PROCEDURE — 3078F DIAST BP <80 MM HG: CPT | Performed by: NURSE PRACTITIONER

## 2025-01-28 PROCEDURE — 1157F ADVNC CARE PLAN IN RCRD: CPT | Performed by: NURSE PRACTITIONER

## 2025-01-28 PROCEDURE — 3074F SYST BP LT 130 MM HG: CPT | Performed by: NURSE PRACTITIONER

## 2025-01-28 PROCEDURE — 1159F MED LIST DOCD IN RCRD: CPT | Performed by: NURSE PRACTITIONER

## 2025-01-28 RX ORDER — NITROFURANTOIN 25; 75 MG/1; MG/1
100 CAPSULE ORAL 2 TIMES DAILY
Qty: 10 CAPSULE | Refills: 0 | Status: SHIPPED | OUTPATIENT
Start: 2025-01-28 | End: 2025-01-28 | Stop reason: SDUPTHER

## 2025-01-28 RX ORDER — NITROFURANTOIN 25; 75 MG/1; MG/1
100 CAPSULE ORAL 2 TIMES DAILY
Qty: 10 CAPSULE | Refills: 0 | Status: SHIPPED | OUTPATIENT
Start: 2025-01-28 | End: 2025-02-02

## 2025-01-28 ASSESSMENT — PATIENT HEALTH QUESTIONNAIRE - PHQ9
1. LITTLE INTEREST OR PLEASURE IN DOING THINGS: NOT AT ALL
SUM OF ALL RESPONSES TO PHQ9 QUESTIONS 1 AND 2: 0
2. FEELING DOWN, DEPRESSED OR HOPELESS: NOT AT ALL

## 2025-01-28 ASSESSMENT — ENCOUNTER SYMPTOMS
CARDIOVASCULAR NEGATIVE: 1
CONSTITUTIONAL NEGATIVE: 1
RESPIRATORY NEGATIVE: 1
ABDOMINAL PAIN: 0
DIARRHEA: 0
DEPRESSION: 0
NAUSEA: 0
FREQUENCY: 1
VOMITING: 0

## 2025-01-28 NOTE — TELEPHONE ENCOUNTER
"Neurology Progress Note  Neurohospitalist Service, [facility]    Referring Physician: Randal Damian D.O.      Interval History: No acute events overnight.  Stable exam, BP at goal normotension    Review of systems: In addition to what is detailed in the HPI and/or updated in the interval history, all other systems reviewed and are negative.    Past Medical History, Past Surgical History and Social History reviewed and unchanged from prior    Medications:    Current Facility-Administered Medications:     Respiratory Therapy Consult, , Nebulization, Continuous RT, Roberta Hernandez D.O.    acetaminophen (Tylenol) tablet 650 mg, 650 mg, Oral, Q4HRS PRN **OR** acetaminophen (TYLENOL) suppository 650 mg, 650 mg, Rectal, Q4HRS PRN, Roberta Hernandez D.O.    ondansetron (ZOFRAN ODT) dispertab 4 mg, 4 mg, Oral, Q4HRS PRN **OR** ondansetron (ZOFRAN) syringe/vial injection 4 mg, 4 mg, Intravenous, Q4HRS PRN, Roberta Hernandez D.O.    labetalol (NORMODYNE/TRANDATE) injection 10 mg, 10 mg, Intravenous, Q4HRS PRN, Roberta Hernandez D.O.    hydrALAZINE (APRESOLINE) injection 10 mg, 10 mg, Intravenous, Q4HRS PRN, Roberta Hernandez D.O.    enalaprilat (Vasotec) injection 1.25 mg 1 mL, 1.25 mg, Intravenous, Q6HRS PRN, Roberta Hernandez D.O.    Physical Examination:   /70   Pulse 71   Temp 36.6 °C (97.9 °F) (Temporal)   Resp 16   Ht 1.6 m (5' 2.99\")   Wt 61.2 kg (134 lb 14.7 oz)   SpO2 98%   BMI 23.91 kg/m²       General: Patient is awake and in no acute distress  Neck: There is normal range of motion  CV: Regular rate   Extremities:  Warm, dry, and intact, without peripheral lower extremity edema    NEUROLOGICAL EXAM:     Mental status: Awake, alert and fully oriented, follows commands  Speech and language: Speech is mildly dysarthric but fluent. The patient is able to name and repeat.  Cranial nerve exam: Pupils are equal, round and reactive to light bilaterally. Visual " Nevaeh:   The patient daughter called the Rx you sent in for Macrobid is for mail order.      Can you send this to her pharm: [facility]     Pt. Zachary;  [phone]   fields are full. There is no nystagmus. Extraocular muscles are intact.  L face droop.   Motor exam: There is sustained antigravity with no downward drift in R arm and bilateral legs.  LUE with downward drift but not to bed.   Sensory exam:  Reacts to tactile in all 4 extremities, there is no neglect to double stim  Coordination: No ataxia on R FTN testing    Objective Data:    Labs:  Lab Results   Component Value Date/Time    PROTHROMBTM 12.9 04/19/2023 07:43 PM    INR 0.98 04/19/2023 07:43 PM      Lab Results   Component Value Date/Time    WBC 6.0 04/21/2023 03:49 AM    RBC 4.24 04/21/2023 03:49 AM    HEMOGLOBIN 12.8 04/21/2023 03:49 AM    HEMATOCRIT 37.8 04/21/2023 03:49 AM    MCV 89.2 04/21/2023 03:49 AM    MCH 30.2 04/21/2023 03:49 AM    MCHC 33.9 04/21/2023 03:49 AM    MPV 9.2 04/21/2023 03:49 AM    NEUTSPOLYS 67.60 04/21/2023 03:49 AM    LYMPHOCYTES 20.30 (L) 04/21/2023 03:49 AM    MONOCYTES 11.00 04/21/2023 03:49 AM    EOSINOPHILS 0.50 04/21/2023 03:49 AM    BASOPHILS 0.30 04/21/2023 03:49 AM      Lab Results   Component Value Date/Time    SODIUM 138 04/21/2023 08:28 AM    POTASSIUM 3.9 04/21/2023 03:49 AM    CHLORIDE 107 04/21/2023 03:49 AM    CO2 19 (L) 04/21/2023 03:49 AM    GLUCOSE 87 04/21/2023 03:49 AM    BUN 12 04/21/2023 03:49 AM    CREATININE 0.49 (L) 04/21/2023 03:49 AM    GLOMRATE 93 12/24/2021 04:08 PM      Lab Results   Component Value Date/Time    CHOLSTRLTOT 215 (H) 01/17/2023 12:45 PM     (H) 01/17/2023 12:45 PM    HDL 98.0 (H) 01/17/2023 12:45 PM    TRIGLYCERIDE 67 01/17/2023 12:45 PM       Lab Results   Component Value Date/Time    ALKPHOSPHAT 52 04/21/2023 03:49 AM    ASTSGOT 17 04/21/2023 03:49 AM    ALTSGPT 12 04/21/2023 03:49 AM    TBILIRUBIN 1.3 04/21/2023 03:49 AM        Imaging/Testing:    I interpreted and/or reviewed the patient's neuroimaging    CT-CTA HEAD WITH & W/O-POST PROCESS   Final Result         1.  No large vessel occlusion or aneurysm identified.   2.  Right  frontal parenchymal hemorrhage with surrounding vasogenic edema, stable.   3.  Left frontal parenchymal hemorrhage with surrounding vasogenic edema, stable.   4.  Right parietal subarachnoid hemorrhage, stable.      DX-FOREARM LEFT   Final Result      No acute osseous abnormality.      DX-CHEST-LIMITED (1 VIEW)   Final Result      No acute cardiopulmonary abnormality.      OUTSIDE IMAGES-CT CERVICAL SPINE   Final Result      OUTSIDE IMAGES-CT HEAD   Final Result      MR-BRAIN-WITH & W/O    (Results Pending)   MR-MRA HEAD-W/O    (Results Pending)       Assessment and Plan:  Farhan Tanner is a 89 year old woman with history of ICH, presenting with L side weakness, found to have R frontoparietal and L frontal ICH.  She has had 4 MRIs in the last 2 years confirming diagnosis of cerebral amyloid angiopathy.  A CTA of the head was negative for underlying vascular lesion.   Hemorrhage without significant mass effect to warrant hyperosmolar therapy or surgical decompression.  Long-term prevention remains strict BP control and avoidance of all antithrombotic therapy.     Problem list:   Recurrent lobar-based ICH  Cerebral amyloid angiopathy    Plan:   - q4h neurochecks/NIHSS   - STRICT SBP goal 100-130 long-term   - avoid all antithrombotic therapy, SCDs only   - normal Na goal 135-145   - PT/OT/SLP   - no additional neurodiagnostic testing warranted at this time   - please reconsult Stroke Neurology with any additional questions or concerns      The evaluation of the patient, and recommended management, was discussed with the ICU staff. I have performed a physical exam and reviewed and updated ROS and Plan today (4/21/2023).     Isidro Pendleton MD  Neurohospitalist, Acute Care Services

## 2025-01-28 NOTE — PATIENT INSTRUCTIONS
Good seeing you today.  Tylenol as needed pain.  Healthy diet as active as possible.  Your urine shows a urinary tract infection.  We will start you on the antibiotic please take 1 tablet twice daily x 5 days.    Increase water intake as tolerated  Follow-up in 4-6 months for physical exam.

## 2025-01-28 NOTE — TELEPHONE ENCOUNTER
Nevaeh:   The patient daughter called the Rx you sent in for Macrobid is for mail order.      Can you send this to her pharm: [facility]     Pt. Zachary;  [phone]

## 2025-01-28 NOTE — PROGRESS NOTES
Subjective   Patient ID: Ayo Bueno is a 84 y.o. female who presents for Establish Care (Patient was short of breath but feels better now. Patient just got over a cold ).    HPI     Previous patient Dr. Timothy Bardales presents to clinic accompanied by her daughter for 4-month follow-up.    Patient with past medical history of anxiety, hypertension, hypothyroidism, hyperlipidemia, GERD    Daughter states she noticed a few weeks ago that she was short of breath when going up and down the stairs but both symptoms have resolved.  Today patient complains of feeling tired, urine frequency and slight burning when she urinates.  States she also had diarrhea but that has also resolved.        Review of Systems   Constitutional: Negative.    Respiratory: Negative.     Cardiovascular: Negative.    Gastrointestinal:  Negative for abdominal pain, diarrhea, nausea and vomiting.   Genitourinary:  Positive for frequency and urgency.        See HPI       Objective   /76 (BP Location: Left arm, Patient Position: Sitting, BP Cuff Size: Adult)   Pulse 78   Temp 36.5 °C (97.7 °F) (Temporal)   Ht 1.524 m (5')   Wt 76.2 kg (168 lb)   SpO2 94%   BMI 32.81 kg/m²     Physical Exam  Vitals reviewed.   Constitutional:       Appearance: Normal appearance. She is not ill-appearing or toxic-appearing.   Cardiovascular:      Rate and Rhythm: Normal rate and regular rhythm.   Pulmonary:      Effort: Pulmonary effort is normal.      Breath sounds: Normal breath sounds.   Abdominal:      Tenderness: There is no abdominal tenderness. There is no right CVA tenderness, left CVA tenderness, guarding or rebound.   Musculoskeletal:      Comments: Ambulates with assist of a cane.   Skin:     General: Skin is warm and dry.   Neurological:      Mental Status: She is alert and oriented to person, place, and time.         Assessment/Plan   Diagnoses and all orders for this visit:  Acute cystitis without hematuria  Urine frequency  -      POCT UA (Automated) docked device-U dip- positive nitrites +1 leukocytes trace protein 2.0 urobilinogen.  -     nitrofurantoin, macrocrystal-monohydrate, (Macrobid) 100 mg capsule; Take 1 capsule (100 mg) by mouth 2 times a day for 5 days.  -Unable to obtain urine culture-patient accidentally spilled most of the urine.   Other orders  -     POCT URINALYSIS AUTOMATED

## 2025-01-30 ENCOUNTER — TELEMEDICINE (OUTPATIENT)
Dept: CARDIOLOGY | Facility: CLINIC | Age: 85
End: 2025-01-30
Payer: MEDICARE

## 2025-01-30 DIAGNOSIS — Z95.2 S/P TAVR (TRANSCATHETER AORTIC VALVE REPLACEMENT): Primary | Chronic | ICD-10-CM

## 2025-01-30 PROCEDURE — 1157F ADVNC CARE PLAN IN RCRD: CPT | Performed by: NURSE PRACTITIONER

## 2025-01-30 PROCEDURE — 99214 OFFICE O/P EST MOD 30 MIN: CPT | Performed by: NURSE PRACTITIONER

## 2025-01-30 PROCEDURE — 1159F MED LIST DOCD IN RCRD: CPT | Performed by: NURSE PRACTITIONER

## 2025-01-30 PROCEDURE — 1160F RVW MEDS BY RX/DR IN RCRD: CPT | Performed by: NURSE PRACTITIONER

## 2025-01-30 PROCEDURE — 1036F TOBACCO NON-USER: CPT | Performed by: NURSE PRACTITIONER

## 2025-01-30 NOTE — PROGRESS NOTES
Structural Heart Follow up visit    Ayo Bueno is a 84 y.o. female presents today for 1 year follow up s/p TAVR      denies SOB,LAL, fatigue  Recent Hospitalizations  No    patient with   Past Medical History:   Diagnosis Date    Aortic stenosis 11/15/2023    moderate    Benign neoplasm, unspecified site     Adenoma    Corns and callosities     Pre-ulcerative corn or callous    Hospital discharge follow-up 03/19/2024    Hyperlipidemia 02/23/2023    Hypertension 02/23/2023    Impingement syndrome of unspecified shoulder     Shoulder impingement syndrome    Mitral stenosis 11/15/2023    Personal history of other diseases of the nervous system and sense organs     History of carpal tunnel syndrome    Personal history of other diseases of urinary system     History of hematuria    Personal history of other endocrine, nutritional and metabolic disease     History of hypokalemia    Personal history of other endocrine, nutritional and metabolic disease     History of obesity    Personal history of other endocrine, nutritional and metabolic disease     History of hyperlipidemia    Personal history of other endocrine, nutritional and metabolic disease     History of hypothyroidism    Personal history of other specified conditions     History of nausea    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    Shortness of breath 11/15/2023    Transient complete heart block 03/01/2024    Unspecified tear of unspecified meniscus, current injury, left knee, initial encounter     Tear of cartilage of left knee       Results for orders placed or performed in visit on 01/28/25 (from the past 96 hours)   POCT URINALYSIS AUTOMATED   Result Value Ref Range    POCT Color, Urine Yellow Straw, Yellow, Light Yellow    POCT Appearance, Urine Clear Clear    POCT Glucose, Urine NEGATIVE NEGATIVE mg/dL    POCT Bilirubin, Urine NEGATIVE NEGATIVE    POCT Ketones, Urine NEGATIVE NEGATIVE mg/dL    POCT Specific Gravity, Urine  1.020 1.005 - 1.035    POCT Blood, Urine NEGATIVE NEGATIVE    POCT pH, Urine 7.0 No Reference Range Established    POCT Protein, Urine TRACE (A) NEGATIVE, 30 (1+) mg/dL    POCT Urobilinogen, Urine 2.0 (A) 0.2, 1.0 E.U./dL    POCT Nitrite, Urine POSITIVE (A) NEGATIVE    PCOT Leukocyte Esterase, Urine SMALL (1+) (A) NEGATIVE        Transthoracic Echo (TTE) Complete    Result Date: 2/28/2024    [facility], [address] [facility] [address]Tbd [phone] and                                 Fax [phone] TRANSTHORACIC ECHOCARDIOGRAM REPORT  Patient Name:      TIEN Betancur Physician:    [id number] Fabiola SIMMONS Study Date:        2/26/2024            Ordering Provider:    [id number] PEGGY DAVIES MRN/PID:           [id number]             Fellow: Accession#:        [id number]         Nurse: Date of Birth/Age: 1940 / 83 years Sonographer:          Natalia Drew                                                               [facility] Gender:            F                    Additional Staff: Height:            154.94 cm            Admit Date: Weight:            71.67 kg             Admission Status: BSA / BMI:         1.71 m2 / 29.85      Encounter#:           [id number]                    kg/m2                                         Department Location:  [facility] Blood Pressure: 130 /56 mmHg Study Type:    TRANSTHORACIC ECHO (TTE) COMPLETE Diagnosis/ICD: Presence of prosthetic heart valve-Z95.2 Indication:    S/P TAVR 29mm Medtronic Evolut 1/30/2024. CPT Code:      Echo Complete w Full Doppler-16032  Study Detail: The following Echo studies were performed: 2D, Doppler, M-Mode and               color flow.  PHYSICIAN INTERPRETATION: Left Ventricle: The left ventricular systolic function is normal, with an estimated ejection fraction of 60-65%. There are no regional wall motion abnormalities. The left  ventricular cavity size is normal. There is mild concentric left ventricular hypertrophy. Spectral Doppler shows an impaired relaxation pattern of left ventricular diastolic filling. Left Atrium: The left atrium is mild to moderately dilated. Right Ventricle: The right ventricle is normal in size. There is normal right ventricular global systolic function. Right Atrium: The right atrium is normal in size. Aortic Valve: There is a prosthetic aortic valve present. There is no evidence of aortic valve regurgitation. The peak instantaneous gradient of the aortic valve is 15.7 mmHg. The mean gradient of the aortic valve is 6.8 mmHg. Well seated #29 Evolut TAVR with an acceptable gradient of 16mmHg, and no evidence for AI. Mitral Valve: The mitral valve is moderately thickened. There is evidence of mild mitral valve stenosis. The doppler estimated mean and peak diastolic pressure gradients are 4.0 mmHg and 11.9 mmHg respectively. There is moderate mitral annular calcification. There is mild mitral valve regurgitation. Tricuspid Valve: The tricuspid valve is structurally normal. There is trace tricuspid regurgitation. Pulmonic Valve: The pulmonic valve is not well visualized. There is no indication of pulmonic valve regurgitation. Pericardium: There is no pericardial effusion noted. Aorta: The aortic root is normal.  CONCLUSIONS:  1. Left ventricular systolic function is normal with a 60-65% estimated ejection fraction.  2. Spectral Doppler shows an impaired relaxation pattern of left ventricular diastolic filling.  3. The left atrium is mild to moderately dilated.  4. The mitral valve is moderately thickened.  5. There is mild mitral valve stenosis.  6. There is moderate mitral annular calcification.  7. Well seated #29 Evolut TAVR with an acceptable gradient of 16mmHg, and no evidence for AI. QUANTITATIVE DATA SUMMARY: 2D MEASUREMENTS:                           Normal Ranges: LAs:           4.48 cm    (2.7-4.0cm) RVIDd:          2.22 cm    (0.9-3.6cm) IVSd:          1.31 cm    (0.6-1.1cm) LVPWd:         1.46 cm    (0.6-1.1cm) LVIDd:         3.80 cm    (3.9-5.9cm) LVIDs:         2.51 cm LV Mass Index: 111.9 g/m2 LV % FS        33.8 % LA VOLUME:                       Normal Ranges: LA Area A4C: 21.8 cm2 LA Area A2C: 27.8 cm2 LA Vol A4C:  72.5 ml LA Vol A2C:  101.4 ml RA VOLUME BY A/L METHOD:                       Normal Ranges: RA Area A4C: 11.6 cm2 AORTA MEASUREMENTS:                    Normal Ranges: Ao STJ, d: 3.10 cm (1.7-3.4cm) Asc Ao, d: 3.10 cm (2.1-3.4cm) LV SYSTOLIC FUNCTION BY 2D PLANIMETRY (MOD):                     Normal Ranges: EF-A4C View: 65.2 % (>=55%) EF-A2C View: 62.9 % EF-Biplane:  64.3 % LV DIASTOLIC FUNCTION:                        Normal Ranges: MV lateral e' 0.06 m/s MV medial e'  0.03 m/s MITRAL VALVE:                       Normal Ranges: MV Vmax:    1.73 m/s  (<=1.3m/s) MV peak P.9 mmHg (<5mmHg) MV mean P.0 mmHg  (<2mmHg) MV VTI:     45.70 cm  (10-13cm) AORTIC VALVE:                                    Normal Ranges: AoV Vmax:                1.98 m/s  (<=1.7m/s) AoV Peak PG:             15.7 mmHg (<20mmHg) AoV Mean P.8 mmHg  (1.7-11.5mmHg) LVOT Max Lele:            1.03 m/s  (<=1.1m/s) AoV VTI:                 37.68 cm  (18-25cm) LVOT VTI:                23.20 cm LVOT Diameter:           2.05 cm   (1.8-2.4cm) AoV Area, VTI:           2.03 cm2  (2.5-5.5cm2) AoV Area,Vmax:           1.71 cm2  (2.5-4.5cm2) AoV Dimensionless Index: 0.62  RIGHT VENTRICLE: RV Basal 2.00 cm RV Mid   2.70 cm RV Major 5.5 cm TAPSE:   16.0 mm RV s'    0.00 m/s TRICUSPID VALVE/RVSP:                             Normal Ranges: Peak TR Velocity: 2.54 m/s RV Syst Pressure: 28.8 mmHg (< 30mmHg) IVC Diam:         0.50 cm  [id number] Fabiola Mccurdy MD Electronically signed on 2024 at 7:08:52 AM  ** Final **     Transthoracic Echo (TTE) Limited    Result Date: 2024   [facility], [address] [place] [address],  [place], [place] [address]                Tel [phone] and Fax [phone] TRANSTHORACIC ECHOCARDIOGRAM REPORT  Patient Name:      TOBIASNIESHA           Pipe Physician:    [id number] Arsalan SIMMONS MD Study Date:        1/31/2024            Ordering Provider:    [id number] ELLIS CATES MRN/PID:           [id number]             Fellow: Accession#:        [id number]         Nurse: Date of Birth/Age: 1940 / 83 years Sonographer:          Lavonne Burks RDCS Gender:            F                    Additional Staff: Height:            152.40 cm            Admit Date:           1/30/2024 Weight:            70.76 kg             Admission Status:     Inpatient -                                                               Routine BSA:               1.68 m2              Encounter#:           [id number]                                         Department Location:  [facility] Blood Pressure: 125 /96 mmHg Study Type:    TRANSTHORACIC ECHO (TTE) LIMITED Diagnosis/ICD: Presence of prosthetic heart valve-Z95.2 Indication:    Presence of prosthetic heart valve CPT Code:      Echo Limited-56938; Doppler Limited-56408; Color Doppler-63799 Patient History: Pertinent History: S/p 29 mm Medtronic Evolut (01/30/2024), HTN, HLD, Mitral                    Stenosis, SOB. Study Detail: The following Echo studies were performed: 2D, M-Mode, Doppler and               color flow. Technically challenging study due to poor acoustic               windows, prominent lung artifact, patient lying in supine position               and body habitus. Definity used as a contrast agent for               endocardial border definition. Total contrast used for this               procedure was 1 mL via IV push.  PHYSICIAN INTERPRETATION: Left Ventricle: The left ventricular systolic function is normal, with an estimated ejection fraction of  65%. There are no regional wall motion abnormalities. The left ventricular cavity size is upper limits of normal. Abnormal (paradoxical) septal motion, consistent with RV pacemaker. Left ventricular diastolic filling was indeterminate. Left Atrium: The left atrium is mildly dilated. Right Ventricle: The right ventricle is normal in size. There is normal right ventricular global systolic function. A device is visualized in the right ventricle. Right Atrium: The right atrium is normal in size. There is a device visualized in the right atrium. Aortic Valve: There is a prosthetic aortic valve present. There is transcatheter aortic valve replacement. There is trivial aortic valve regurgitation. The peak instantaneous gradient of the aortic valve is 19.5 mmHg. The mean gradient of the aortic valve is 9.0 mmHg. Mitral Valve: The mitral valve is abnormal. There is moderate thickening and calcification of the anterior and posterior mitral valve leaflets. There is evidence of moderate mitral valve stenosis. The doppler estimated mean and peak diastolic pressure gradients are 6.0 mmHg and 13.8 mmHg respectively. There is moderate mitral annular calcification. There is mild mitral valve regurgitation. The mean gradient of the mitral valve is 6.0 mmHg. Tricuspid Valve: The tricuspid valve is structurally normal. There is trace to mild tricuspid regurgitation. Pulmonic Valve: The pulmonic valve is not well visualized. There is trace pulmonic valve regurgitation. Pericardium: There is a trivial pericardial effusion. Aorta: The aortic root was not well visualized. There is no dilatation of the aortic root. Pulmonary Artery: The tricuspid regurgitant velocity is 2.76 m/s, and with an estimated right atrial pressure of 3 mmHg, the estimated pulmonary artery pressure is borderline elevated with the RVSP at 33.5 mmHg. Systemic Veins: The inferior vena cava appears to be of normal size.  CONCLUSIONS:  1. Left ventricular systolic  function is normal with a 65% estimated ejection fraction.  2. Abnormal septal motion consistent with RV pacemaker.  3. There is moderate mitral annular calcification.  4. There is moderate thickening and calcification of the anterior and posterior mitral valve leaflets.  5. There is moderate mitral valve stenosis.  6. There is a transcatheter aortic valve replacement. QUANTITATIVE DATA SUMMARY: 2D MEASUREMENTS:                           Normal Ranges: Ao Root d:     2.00 cm    (2.0-3.7cm) LAs:           4.20 cm    (2.7-4.0cm) IVSd:          0.80 cm    (0.6-1.1cm) LVPWd:         0.80 cm    (0.6-1.1cm) LVIDd:         5.20 cm    (3.9-5.9cm) LVIDs:         3.85 cm LV Mass Index: 107.2 g/m2 LV % FS        26.0 % AORTA MEASUREMENTS:                      Normal Ranges: Ao Sinus, d: 3.20 cm (2.1-3.5cm) LV SYSTOLIC FUNCTION BY 2D PLANIMETRY (MOD):                     Normal Ranges: EF-A4C View: 70.0 % (>=55%) EF-A2C View: 57.7 % EF-Biplane:  64.6 % LV DIASTOLIC FUNCTION:                        Normal Ranges: MV Peak E:    1.85 m/s (0.7-1.2 m/s) MV Peak A:    1.58 m/s (0.42-0.7 m/s) E/A Ratio:    1.17     (1.0-2.2) MV e'         0.06 m/s (>8.0) MV lateral e' 0.08 m/s MV medial e'  0.05 m/s E/e' Ratio:   28.46    (<8.0) a'            0.06 m/s MV DT:        302 msec (150-240 msec) MITRAL VALVE:                       Normal Ranges: MV Vmax:    1.86 m/s  (<=1.3m/s) MV peak P.8 mmHg (<5mmHg) MV mean P.0 mmHg  (<2mmHg) MV DT:      302 msec  (150-240msec) AORTIC VALVE:                                    Normal Ranges: AoV Vmax:                2.21 m/s  (<=1.7m/s) AoV Peak P.5 mmHg (<20mmHg) AoV Mean P.0 mmHg  (1.7-11.5mmHg) LVOT Max Lele:            1.56 m/s  (<=1.1m/s) AoV VTI:                 38.10 cm  (18-25cm) LVOT VTI:                28.20 cm LVOT Diameter:           1.90 cm   (1.8-2.4cm) AoV Area, VTI:           2.10 cm2  (2.5-5.5cm2) AoV Area,Vmax:           2.00 cm2  (2.5-4.5cm2) AoV  Dimensionless Index: 0.74  RIGHT VENTRICLE: TAPSE: 25.1 mm RV s'  0.14 m/s TRICUSPID VALVE/RVSP:                             Normal Ranges: Peak TR Velocity: 2.76 m/s RV Syst Pressure: 33.5 mmHg (< 30mmHg) IVC Diam:         1.50 cm  [id number] Arsalan Flor MD Electronically signed on 1/31/2024 at 12:41:51 PM  ** Final **                  Heart Failure Follow up    NYHA class 1    Edema Denies  Dyspnea on Exertion Denies  Fatigue Stable  Exercise Intolerance Stable  Orthopnea Denies  PND Denies    Chest pain Yes  Syncope No  Palpitations No    All organ systems normal except: Recent UTI and falls               KCCQ Questionnaire    1  Heart failure affects different people in different ways. Some feel shortness of breath while others feel fatigue. Please indicate how much you are limited by heart failure (shortness of breath or fatigue) in your ability to do the following activities over the past 2 weeks.     A.) Showering/bathing  5. Not at All  B.) Walking 1 block on level ground 5. Not at All  C.) Hurrying or Jogging   6. Limited for other reastons    2.  Over the past 2 weeks, how many times did you have swelling in your feet, ankles or legs when you woke up in the morning? 5. Never    3.  Over the past 2 weeks, on average, how many times has fatigue limited your ability to do what you wanted? 6. Less than once a week    4.  Over the past 2 weeks, on average, how many times has shortness of breath limited your ability to do what you wanted? 7. Never    5.  Over the past 2 weeks, on average, how many times have you been forced to sleep sitting up in a chair or with at least 3 pillows to prop you up because of shortness of breath? Never    6. Over the past 2 weeks, how much has your heart failure limited your enjoyment of life? It has not limited my enjoyment of life    7. If you had to spend the rest of your life with your heart failure the way it is right now, how would you feel about this? 5. Completely  satisfied    8. How much does your heart failure affect your lifestyle? Please indicate how your heart failure may have limited yourparticipation in the following activities over the past 2 weeks    A.)  Hobbies, recreational activities  5. Did not limit at all    B.) Working or doing household chores  5. Did not limit at all    C.) Visiting family or friends out of your home  5. Did not limit at all        Ayo Bueno is a 83yo female. S/p TAVR - Evolut FX 29mm Predil with 18mm TruDil Balloon via right Femoral Artery on 1/30/2024 with Dr. Carrsaco. 1 year follow-up.    Impression:  - 1 year s/p TAVR  - Pt appears to be euvolemic with flat neck veins and no BLE edema.  Work of breathing normal with NAD, skin tone without pallor.   - HF symptoms: Now that she has been treated for her UTI she no longer has any heart failure symptoms.  At the time of her UTI they reported complaints of shortness of breath and fatigue  - vitals: HR stable, /72, weight stable  - Chest pain: Reports occasional non-radiating chest pain that is generally to when she is left or right of the sternum, can occur with exertion and/or rest, resolves without intervention.  Left heart cath from a year ago shows minimal CAD so the likelihood chest pain is related to ACS is highly unlikely, patient also denies any other associated symptoms with ACS.  - Overall visually looks well, clinically doing well.  Encouraged to increase her activity level with regular exercise and maintain adequate p.o. intake    1 year ECHO - ordered, needed for TVT registry    Plan:   -Echo ordered, will follow/discussed results with patient and daughter  - Cont ASA for life  - Cont current medication regimen  - Cont to increase activity  - No further follow-ups with Structural Heart  - f/u with Dr. Mccurdy (Primary Cards) as scheduled  - Annual ECHO recommended  - Life-long Dental SBE prophylaxis needed - Dentist orders      Virtual Visit    Isiah Mayo  MSN, [facility]  Acute Care Nurse Practitioner  Structural Heart / TAVR Team  [phone] (ph)  [phone] (fax)

## 2025-02-10 ENCOUNTER — TELEPHONE (OUTPATIENT)
Dept: PRIMARY CARE | Facility: CLINIC | Age: 85
End: 2025-02-10

## 2025-02-10 ENCOUNTER — CLINICAL SUPPORT (OUTPATIENT)
Dept: PRIMARY CARE | Facility: CLINIC | Age: 85
End: 2025-02-10
Payer: MEDICARE

## 2025-02-10 DIAGNOSIS — R35.0 URINE FREQUENCY: Primary | ICD-10-CM

## 2025-02-10 DIAGNOSIS — N30.90 CYSTITIS: ICD-10-CM

## 2025-02-10 DIAGNOSIS — E06.3 HYPOTHYROIDISM DUE TO HASHIMOTO'S THYROIDITIS: ICD-10-CM

## 2025-02-10 RX ORDER — LEVOTHYROXINE SODIUM 50 UG/1
TABLET ORAL
Qty: 108 TABLET | Refills: 3 | Status: SHIPPED | OUTPATIENT
Start: 2025-02-10

## 2025-02-10 NOTE — TELEPHONE ENCOUNTER
Pt daughter going to  urine cup & return back.  She had urine test   1/28 & took abx but is still going constantly.  Can you please put in order?  Ty

## 2025-02-12 LAB
APPEARANCE UR: ABNORMAL
BACTERIA #/AREA URNS HPF: ABNORMAL /HPF
BACTERIA UR CULT: ABNORMAL
BACTERIA UR CULT: ABNORMAL
BILIRUB UR QL STRIP: NEGATIVE
COLOR UR: YELLOW
GLUCOSE UR QL STRIP: NEGATIVE
HGB UR QL STRIP: ABNORMAL
HYALINE CASTS #/AREA URNS LPF: ABNORMAL /LPF
KETONES UR QL STRIP: NEGATIVE
LEUKOCYTE ESTERASE UR QL STRIP: ABNORMAL
NITRITE UR QL STRIP: POSITIVE
PH UR STRIP: 6 [PH] (ref 5–8)
PROT UR QL STRIP: ABNORMAL
RBC #/AREA URNS HPF: ABNORMAL /HPF
SERVICE CMNT-IMP: ABNORMAL
SP GR UR STRIP: 1.01 (ref 1–1.03)
SQUAMOUS #/AREA URNS HPF: ABNORMAL /HPF
WBC #/AREA URNS HPF: ABNORMAL /HPF

## 2025-02-13 RX ORDER — CIPROFLOXACIN 500 MG/1
500 TABLET ORAL 2 TIMES DAILY
Qty: 14 TABLET | Refills: 0 | Status: SHIPPED | OUTPATIENT
Start: 2025-02-13 | End: 2025-02-20

## 2025-02-17 ENCOUNTER — APPOINTMENT (OUTPATIENT)
Dept: CARDIOLOGY | Facility: CLINIC | Age: 85
End: 2025-02-17
Payer: MEDICARE

## 2025-02-18 ENCOUNTER — HOSPITAL ENCOUNTER (OUTPATIENT)
Dept: CARDIOLOGY | Facility: CLINIC | Age: 85
Discharge: HOME | End: 2025-02-18
Payer: MEDICARE

## 2025-02-18 DIAGNOSIS — Z95.2 S/P TAVR (TRANSCATHETER AORTIC VALVE REPLACEMENT): Chronic | ICD-10-CM

## 2025-02-18 LAB
AORTIC VALVE MEAN GRADIENT: 11 MMHG
AORTIC VALVE PEAK VELOCITY: 2.41 M/S
AV PEAK GRADIENT: 23 MMHG
AVA (PEAK VEL): 1.69 CM2
AVA (VTI): 1.74 CM2
EJECTION FRACTION APICAL 4 CHAMBER: 68.1
EJECTION FRACTION: 60 %
LEFT ATRIUM VOLUME AREA LENGTH INDEX BSA: 44.2 ML/M2
LEFT VENTRICLE INTERNAL DIMENSION DIASTOLE: 4.34 CM (ref 3.5–6)
LEFT VENTRICULAR OUTFLOW TRACT DIAMETER: 2.06 CM
MITRAL VALVE E/A RATIO: 0.76
RIGHT VENTRICLE FREE WALL PEAK S': 0.1 CM/S
RIGHT VENTRICLE PEAK SYSTOLIC PRESSURE: 27.8 MMHG
TRICUSPID ANNULAR PLANE SYSTOLIC EXCURSION: 1.6 CM

## 2025-02-18 PROCEDURE — 93306 TTE W/DOPPLER COMPLETE: CPT

## 2025-02-18 PROCEDURE — 93306 TTE W/DOPPLER COMPLETE: CPT | Performed by: INTERNAL MEDICINE

## 2025-02-24 ENCOUNTER — APPOINTMENT (OUTPATIENT)
Dept: PRIMARY CARE | Facility: CLINIC | Age: 85
End: 2025-02-24
Payer: MEDICARE

## 2025-03-31 PROBLEM — H04.129 DRY EYE SYNDROME OF UNSPECIFIED LACRIMAL GLAND: Status: ACTIVE | Noted: 2024-09-01

## 2025-03-31 PROBLEM — I11.9 HYPERTENSIVE HEART DISEASE WITHOUT HEART FAILURE: Status: ACTIVE | Noted: 2024-09-01

## 2025-03-31 PROBLEM — R07.9 CHEST PAIN, UNSPECIFIED: Status: ACTIVE | Noted: 2024-09-05

## 2025-03-31 PROBLEM — Z09 HOSPITAL DISCHARGE FOLLOW-UP: Chronic | Status: RESOLVED | Noted: 2024-03-19 | Resolved: 2025-03-31

## 2025-03-31 PROBLEM — E66.811 CLASS 1 OBESITY WITH BODY MASS INDEX (BMI) OF 32.0 TO 32.9 IN ADULT: Status: ACTIVE | Noted: 2024-09-01

## 2025-03-31 PROBLEM — M62.81 MUSCLE WEAKNESS (GENERALIZED): Status: ACTIVE | Noted: 2024-09-01

## 2025-03-31 PROBLEM — E66.9 OBESITY, UNSPECIFIED: Status: ACTIVE | Noted: 2024-09-01

## 2025-04-03 DIAGNOSIS — E78.5 HYPERLIPIDEMIA, UNSPECIFIED HYPERLIPIDEMIA TYPE: ICD-10-CM

## 2025-04-03 DIAGNOSIS — I10 HYPERTENSION, UNSPECIFIED TYPE: ICD-10-CM

## 2025-04-04 RX ORDER — HYDROCHLOROTHIAZIDE 25 MG/1
25 TABLET ORAL DAILY
Qty: 90 TABLET | Refills: 3 | Status: SHIPPED | OUTPATIENT
Start: 2025-04-04

## 2025-04-04 RX ORDER — LOVASTATIN 40 MG/1
TABLET ORAL
Qty: 90 TABLET | Refills: 3 | Status: SHIPPED | OUTPATIENT
Start: 2025-04-04

## 2025-04-22 ENCOUNTER — OFFICE VISIT (OUTPATIENT)
Dept: CARDIOLOGY | Facility: CLINIC | Age: 85
End: 2025-04-22
Payer: MEDICARE

## 2025-04-22 VITALS
DIASTOLIC BLOOD PRESSURE: 82 MMHG | HEART RATE: 78 BPM | OXYGEN SATURATION: 97 % | BODY MASS INDEX: 33.59 KG/M2 | WEIGHT: 172 LBS | SYSTOLIC BLOOD PRESSURE: 138 MMHG

## 2025-04-22 DIAGNOSIS — I44.2 TRANSIENT COMPLETE HEART BLOCK: Chronic | ICD-10-CM

## 2025-04-22 DIAGNOSIS — I34.2 NONRHEUMATIC MITRAL VALVE STENOSIS: Chronic | ICD-10-CM

## 2025-04-22 DIAGNOSIS — I10 HYPERTENSION, UNSPECIFIED TYPE: ICD-10-CM

## 2025-04-22 DIAGNOSIS — E78.2 MIXED HYPERLIPIDEMIA: Primary | Chronic | ICD-10-CM

## 2025-04-22 DIAGNOSIS — I38 VHD (VALVULAR HEART DISEASE): ICD-10-CM

## 2025-04-22 DIAGNOSIS — Z95.2 S/P TAVR (TRANSCATHETER AORTIC VALVE REPLACEMENT): Chronic | ICD-10-CM

## 2025-04-22 DIAGNOSIS — E78.5 HYPERLIPIDEMIA, UNSPECIFIED HYPERLIPIDEMIA TYPE: ICD-10-CM

## 2025-04-22 DIAGNOSIS — I10 PRIMARY HYPERTENSION: Chronic | ICD-10-CM

## 2025-04-22 PROBLEM — F41.9 ANXIETY: Status: RESOLVED | Noted: 2024-09-03 | Resolved: 2025-04-22

## 2025-04-22 PROBLEM — R53.1 WEAKNESS: Status: RESOLVED | Noted: 2024-09-03 | Resolved: 2025-04-22

## 2025-04-22 PROBLEM — M25.512 CHRONIC LEFT SHOULDER PAIN: Status: RESOLVED | Noted: 2024-09-03 | Resolved: 2025-04-22

## 2025-04-22 PROBLEM — G89.29 CHRONIC LEFT SHOULDER PAIN: Status: RESOLVED | Noted: 2024-09-03 | Resolved: 2025-04-22

## 2025-04-22 PROBLEM — H04.129 DRY EYE SYNDROME OF UNSPECIFIED LACRIMAL GLAND: Status: RESOLVED | Noted: 2024-09-01 | Resolved: 2025-04-22

## 2025-04-22 PROBLEM — R26.81 UNSTEADY GAIT: Status: RESOLVED | Noted: 2023-02-23 | Resolved: 2025-04-22

## 2025-04-22 PROBLEM — R73.01 IFG (IMPAIRED FASTING GLUCOSE): Status: RESOLVED | Noted: 2023-02-23 | Resolved: 2025-04-22

## 2025-04-22 PROBLEM — M62.81 MUSCLE WEAKNESS (GENERALIZED): Status: RESOLVED | Noted: 2024-09-01 | Resolved: 2025-04-22

## 2025-04-22 PROCEDURE — 3075F SYST BP GE 130 - 139MM HG: CPT | Performed by: INTERNAL MEDICINE

## 2025-04-22 PROCEDURE — 99212 OFFICE O/P EST SF 10 MIN: CPT | Performed by: INTERNAL MEDICINE

## 2025-04-22 PROCEDURE — 1036F TOBACCO NON-USER: CPT | Performed by: INTERNAL MEDICINE

## 2025-04-22 PROCEDURE — 3079F DIAST BP 80-89 MM HG: CPT | Performed by: INTERNAL MEDICINE

## 2025-04-22 PROCEDURE — 1157F ADVNC CARE PLAN IN RCRD: CPT | Performed by: INTERNAL MEDICINE

## 2025-04-22 PROCEDURE — 1160F RVW MEDS BY RX/DR IN RCRD: CPT | Performed by: INTERNAL MEDICINE

## 2025-04-22 PROCEDURE — 1159F MED LIST DOCD IN RCRD: CPT | Performed by: INTERNAL MEDICINE

## 2025-04-22 PROCEDURE — 99214 OFFICE O/P EST MOD 30 MIN: CPT | Performed by: INTERNAL MEDICINE

## 2025-04-22 RX ORDER — HYDROCHLOROTHIAZIDE 12.5 MG/1
12.5 CAPSULE ORAL DAILY
Qty: 90 CAPSULE | Refills: 3 | Status: SHIPPED | OUTPATIENT
Start: 2025-04-22 | End: 2026-04-22

## 2025-04-22 RX ORDER — HYDROCHLOROTHIAZIDE 12.5 MG/1
12.5 CAPSULE ORAL DAILY
Qty: 90 TABLET | Refills: 3 | Status: SHIPPED | OUTPATIENT
Start: 2025-04-22 | End: 2025-04-22 | Stop reason: SDUPTHER

## 2025-04-22 NOTE — PATIENT INSTRUCTIONS
1. Aortic stenosis. 1/30/2024 status post TAVR with a number 29 mm evolute valve.  Doing great.  Symptoms have improved.  Less shortness of breath and improved energy. TTE 2/18/2025 EF 60%, DD EF, LVH, LAE, MVT with mild MS, moderate MAC, well-seated TAVR peak gradient 23 mm    2. Vertigo- admitted 9/4/24. Not related bradycardia.      3. Shortness of breath.  Substantially improved with a TAVR    4. Hypertension. Well-controlled.  Decrease hydrochlorothiazide to 12.5 daily as she is urinating frequently.  I do not think this have an impact on nocturnal urination.     5. Hyperlipidemia. Followedby her PCP.  1/31/2024 LDL 68 HDL 51 triglycerides 59. This will be rechecked  in the near future.     6. MS Mild.  At this age I would leave this alone.TTE 2/18/2025 EF 60%, DD EF, LVH, LAE, MVT with mild MS, moderate MAC, well-seated TAVR peak gradient 23 mm    7.  Bradycardia.  Transient complete heart block after the TAVR requiring temporary transvenous pacing.  Follow-up Holter nothing obvious found.  Seen by Dr. Ramicone 3/12/2024. A repeat 7 day holter done by 3/13/24.dizziness currently not related to bradycardia.     RTC 1 year

## 2025-04-22 NOTE — PROGRESS NOTES
Referred by Kaz Mack seeing the pt for a 6 month follow up. She had a fall due to dizziness. She hit her head. Kept her in the hosp then went to [facility].  ? Vertigo.     Past Medical History:  Problem List Items Addressed This Visit    None     Past Medical History:   Diagnosis Date    Aortic stenosis 11/15/2023    moderate    Benign neoplasm, unspecified site     Adenoma    Corns and callosities     Pre-ulcerative corn or callous    Hospital discharge follow-up 03/19/2024    Hyperlipidemia 02/23/2023    Hypertension 02/23/2023    Impingement syndrome of unspecified shoulder     Shoulder impingement syndrome    Mitral stenosis 11/15/2023    Personal history of other diseases of the nervous system and sense organs     History of carpal tunnel syndrome    Personal history of other diseases of urinary system     History of hematuria    Personal history of other endocrine, nutritional and metabolic disease     History of hypokalemia    Personal history of other endocrine, nutritional and metabolic disease     History of obesity    Personal history of other endocrine, nutritional and metabolic disease     History of hyperlipidemia    Personal history of other endocrine, nutritional and metabolic disease     History of hypothyroidism    Personal history of other specified conditions     History of nausea    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    Shortness of breath 11/15/2023    Transient complete heart block 03/01/2024    Unspecified tear of unspecified meniscus, current injury, left knee, initial encounter     Tear of cartilage of left knee      Past Surgical History:  She has a past surgical history that includes Other surgical history (07/12/2018); Appendectomy (07/12/2018); Knee surgery (09/19/2018); Cardiac catheterization (N/A, 1/16/2024); Cardiac catheterization (N/A, 1/30/2024); Cardiac catheterization (N/A, 1/30/2024); Cardiac catheterization (N/A, 1/30/2024); and Anomalous  pulmonary venous return repair, total (N/A, 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      Social History:  She reports that she has never smoked. She has never been exposed to tobacco smoke. She has never used smokeless tobacco. She reports that she does not currently use alcohol. She reports that she does not use drugs.    Family History:  Family History   Problem Relation Name Age of Onset    Tuberculosis Mother      Stroke Sister      Coronary artery disease Sister      Diabetes Brother       Allergies:  Aspirin, Penicillins, and Sulfa (sulfonamide antibiotics)    Outpatient Medications:  Current Outpatient Medications   Medication Instructions    acetaminophen (Tylenol) 325 mg tablet Every 6 hours    aspirin 81 mg, Daily    busPIRone (BUSPAR) 10 mg, oral, 2 times daily    cholecalciferol (VITAMIN D-3) 25 mcg, Daily    hydroCHLOROthiazide (HYDRODIURIL) 25 mg, oral, Daily    levothyroxine (Synthroid, Levoxyl) 50 mcg tablet TAKE ONE TABLET DAILY MONDAY THROUGH FRIDAY AND 2 TABLETS ON SATURDAY AND SUNDAY    lovastatin (Mevacor) 40 mg tablet TAKE 1 TABLET EVERY DAY AT DINNER    omeprazole (PRILOSEC) 20 mg, oral, Daily    polyethylene glycol (GLYCOLAX, MIRALAX) 17 g, Daily PRN    sertraline (ZOLOFT) 100 mg, Nightly     Last Recorded Vitals:  There were no vitals filed for this visit.    Physical Exam  Patient is alert and oriented x3.  HEENT is unremarkable mucous members are moist  Neck no JVP no bruits upstrokes are full no thyromegaly  Lungs are clear bilaterally.  No wheezing crackles or rales  Heart regular rhythm normal S1-S2 there is no S3 soft systolic ejection murmur abdomen is soft bs are positive nontender nondistended no organomegaly no pulsatile masses  Extremities have no edema.  Distal pulses present palpable.  Neuro is grossly nonfocal  Skin has no rashes     Last Labs:  CBC -  Lab Results   Component Value Date    WBC 4.2 (L) 08/28/2024    HGB 11.6 (L) 08/28/2024    HCT 34.0 (L) 08/28/2024    MCV 88 08/28/2024     (L)  08/28/2024     CMP -  Lab Results   Component Value Date    CALCIUM 9.1 08/28/2024    PHOS 4.6 01/26/2024    PROT 7.4 08/27/2024    ALBUMIN 4.1 08/27/2024    AST 20 08/27/2024    ALT 12 08/27/2024    ALKPHOS 51 08/27/2024    BILITOT 0.6 08/27/2024     LIPID PANEL -   Lab Results   Component Value Date    CHOL 131 01/30/2024    HDL 51.1 01/30/2024    CHHDL 2.6 01/30/2024    VLDL 12 01/30/2024    TRIG 59 01/30/2024    NHDL 80 01/30/2024     RENAL FUNCTION PANEL -   Lab Results   Component Value Date    K 3.5 08/28/2024    PHOS 4.6 01/26/2024     Lab Results   Component Value Date    BNP 96 08/27/2024    HGBA1C 5.9 10/01/2024    HGBA1C 5.9 (H) 07/18/2024     Procedure    TTE 2/18/2025 EF 60%, DD EF, LVH, LAE, MVT with mild MS, moderate MAC, well-seated TAVR peak gradient 23 mm    Carotid duplex 8/28/24 50% BL    Holter 3/13/2024    Echo 2/26/2024 EF 65%, DDF, LAE, LVOT with mild MS, well-seated #29 evolute TAVR peak gradient 16 mm    Holter 1/31/2024 3-second pause    TAVR 1/30/2024 Evolut fracture 29 mm complicated with transient complete heart block    Cardiac catheterization 1/16/2024 luminal irregularities    Echo 9/2023 EF 60%, Mild MS with mild/mod MR, Severe A S A V max 4.9m/s 95/55mmHg with mild to mod A I    ECHO [04/22/2021]: EF 60-65%. SD = impaired relaxation pattern. MV mod thickened. Mild mitral stenosis. Mod MAC. Mod AS (AVmax 3 M/s, mean gradient 20 mmHg, DI 0.36, NEHEMIAH 1 cm2). Mild AR.          Assessment/Plan   1. Aortic stenosis. 1/30/2024 status post TAVR with a number 29 mm evolute valve.  Doing great.  Symptoms have improved.  Less shortness of breath and improved energy. TTE 2/18/2025 EF 60%, DD EF, LVH, LAE, MVT with mild MS, moderate MAC, well-seated TAVR peak gradient 23 mm    2. Vertigo- admitted 9/4/24. Not related bradycardia.      3. Shortness of breath.  Substantially improved with a TAVR    4. Hypertension. Well-controlled.  Decrease hydrochlorothiazide to 12.5 daily as she is urinating  frequently.  I do not think this have an impact on nocturnal urination.     5. Hyperlipidemia. Followedby her PCP.  1/31/2024 LDL 68 HDL 51 triglycerides 59. This will be rechecked  in the near future.     6. MS Mild.  At this age I would leave this alone.TTE 2/18/2025 EF 60%, DD EF, LVH, LAE, MVT with mild MS, moderate MAC, well-seated TAVR peak gradient 23 mm    7.  Bradycardia.  Transient complete heart block after the TAVR requiring temporary transvenous pacing.  Follow-up Holter nothing obvious found.  Seen by Dr. Ramicone 3/12/2024. A repeat 7 day holter done by 3/13/24.dizziness currently not related to bradycardia.     RTC 1 year  Fabiola Mccurdy MD     Instructions and follow up

## 2025-04-30 DIAGNOSIS — K21.9 GASTROESOPHAGEAL REFLUX DISEASE, UNSPECIFIED WHETHER ESOPHAGITIS PRESENT: ICD-10-CM

## 2025-05-02 RX ORDER — OMEPRAZOLE 20 MG/1
20 CAPSULE, DELAYED RELEASE ORAL DAILY
Qty: 90 CAPSULE | Refills: 3 | Status: SHIPPED | OUTPATIENT
Start: 2025-05-02

## 2025-06-16 ENCOUNTER — OFFICE VISIT (OUTPATIENT)
Dept: URGENT CARE | Age: 85
End: 2025-06-16
Payer: MEDICARE

## 2025-06-16 VITALS
OXYGEN SATURATION: 95 % | RESPIRATION RATE: 18 BRPM | WEIGHT: 170 LBS | HEART RATE: 68 BPM | DIASTOLIC BLOOD PRESSURE: 80 MMHG | BODY MASS INDEX: 33.38 KG/M2 | HEIGHT: 60 IN | TEMPERATURE: 97.8 F | SYSTOLIC BLOOD PRESSURE: 137 MMHG

## 2025-06-16 DIAGNOSIS — R35.0 URINARY FREQUENCY: ICD-10-CM

## 2025-06-16 LAB
POC APPEARANCE, URINE: CLEAR
POC BILIRUBIN, URINE: NEGATIVE
POC BLOOD, URINE: NEGATIVE
POC COLOR, URINE: YELLOW
POC GLUCOSE, URINE: NEGATIVE MG/DL
POC KETONES, URINE: NEGATIVE MG/DL
POC LEUKOCYTES, URINE: NEGATIVE
POC NITRITE,URINE: NEGATIVE
POC PH, URINE: 7 PH
POC PROTEIN, URINE: NEGATIVE MG/DL
POC SPECIFIC GRAVITY, URINE: 1.01
POC UROBILINOGEN, URINE: 0.2 EU/DL

## 2025-06-16 RX ORDER — CIPROFLOXACIN 500 MG/1
500 TABLET, FILM COATED ORAL 2 TIMES DAILY
Qty: 14 TABLET | Refills: 0 | Status: SHIPPED | OUTPATIENT
Start: 2025-06-16 | End: 2025-06-23

## 2025-06-16 ASSESSMENT — ENCOUNTER SYMPTOMS
FREQUENCY: 1
DYSURIA: 1
ABDOMINAL PAIN: 0
HEMATURIA: 0
FEVER: 0
VOMITING: 0

## 2025-06-16 ASSESSMENT — PATIENT HEALTH QUESTIONNAIRE - PHQ9
SUM OF ALL RESPONSES TO PHQ9 QUESTIONS 1 AND 2: 0
1. LITTLE INTEREST OR PLEASURE IN DOING THINGS: NOT AT ALL
2. FEELING DOWN, DEPRESSED OR HOPELESS: NOT AT ALL

## 2025-06-16 NOTE — PROGRESS NOTES
Subjective   Patient ID: Ayo Bueno is a 84 y.o. female. They present today with a chief complaint of Difficulty Urinating (Urinary frequency x 1 week).    History of Present Illness  Patient is syed 84 year old female presenting for evaluation of urinary frequency. Symptoms started about a week ago. Symptoms worse at night, usually gets up 1-2 times a night to urinate (daughter thinks from afternoon coffee) but recently has been going 3-4 times at night which is unusual for her. This is typically how her UTIs present. Patient denies hematuria, abdominal pain, fever or vomiting. It does burn at times when she urinates.       History provided by:  Patient and relative   used: Yes (daughter)        Past Medical History  Allergies as of 06/16/2025 - Reviewed 06/16/2025   Allergen Reaction Noted    Aspirin GI Upset 02/23/2023    Penicillins Rash 09/24/2024    Sulfa (sulfonamide antibiotics) Rash 03/08/2024       Prescriptions Prior to Admission[1]     Medical History[2]    Surgical History[3]     reports that she has never smoked. She has never been exposed to tobacco smoke. She has never used smokeless tobacco. She reports that she does not currently use alcohol. She reports that she does not use drugs.    Review of Systems  Review of Systems   Constitutional:  Negative for fever.   Gastrointestinal:  Negative for abdominal pain and vomiting.   Genitourinary:  Positive for dysuria and frequency. Negative for hematuria.                                  Objective    Vitals:    06/16/25 1136   BP: 137/80   BP Location: Left arm   Patient Position: Sitting   Pulse: 68   Resp: 18   Temp: 36.6 °C (97.8 °F)   SpO2: 95%   Weight: 77.1 kg (170 lb)   Height: 1.524 m (5')     No LMP recorded. Patient is postmenopausal.    Physical Exam  Constitutional:       General: She is not in acute distress.     Appearance: Normal appearance. She is normal weight. She is not ill-appearing, toxic-appearing or  diaphoretic.   HENT:      Head: Normocephalic. No right periorbital erythema or left periorbital erythema.      Jaw: There is normal jaw occlusion.   Eyes:      General: No scleral icterus.        Right eye: No discharge.         Left eye: No discharge.      Conjunctiva/sclera: Conjunctivae normal.   Neck:      Trachea: Phonation normal.   Cardiovascular:      Rate and Rhythm: Normal rate and regular rhythm.      Heart sounds: No murmur heard.     No friction rub. No gallop.   Pulmonary:      Effort: Pulmonary effort is normal. No respiratory distress.      Breath sounds: Normal breath sounds. No stridor. No wheezing, rhonchi or rales.   Abdominal:      General: Abdomen is flat.      Palpations: Abdomen is soft.      Tenderness: There is no abdominal tenderness. There is no right CVA tenderness, left CVA tenderness or guarding.   Neurological:      Mental Status: She is alert.   Psychiatric:         Mood and Affect: Mood normal.         Behavior: Behavior normal.         Thought Content: Thought content normal.         Procedures    Point of Care Test & Imaging Results from this visit  Results for orders placed or performed in visit on 06/16/25   POCT UA Automated manually resulted   Result Value Ref Range    POC Color, Urine Yellow Straw, Yellow, Light-Yellow    POC Appearance, Urine Clear Clear    POC Glucose, Urine NEGATIVE NEGATIVE mg/dl    POC Bilirubin, Urine NEGATIVE NEGATIVE    POC Ketones, Urine NEGATIVE NEGATIVE mg/dl    POC Specific Gravity, Urine 1.015 1.005 - 1.035    POC Blood, Urine NEGATIVE NEGATIVE    POC PH, Urine 7.0 No Reference Range Established PH    POC Protein, Urine NEGATIVE NEGATIVE mg/dl    POC Urobilinogen, Urine 0.2 0.2, 1.0 EU/DL    Poc Nitrite, Urine NEGATIVE NEGATIVE    POC Leukocytes, Urine NEGATIVE NEGATIVE      Imaging  No results found.    Cardiology, Vascular, and Other Imaging  No other imaging results found for the past 2 days      Diagnostic study results (if any) were  reviewed by Peace Goel PA-C.    Assessment/Plan   Allergies, medications, history, and pertinent labs/EKGs/Imaging reviewed by Peace Goel PA-C.     Medical Decision Making  Patient is an 84 year old female presenting with her daughter for urinary frequency. Hemodynamically stable. UA without signs of infection. No CVA tenderness or abdominal tenderness to suggest ureterolithiasis or pyelonephritis. Will send urine for culture but start antibiotics pending results given presenting symptoms and history of UTI. Last UTI macrobid did not work although no resistance on culture. She is allergic to penicillins and bactrim. She tolerated cipro in the past. Will start this while awaiting culture.  ER if flank pain, abdominal pain, vomiting, fever.       At time of discharge, patient was clinically well-appearing and appropriate for outpatient management. The patient/parent/guardian was educated regarding diagnosis, supportive care, OTC and Rx medications. The patient/parent/guardian was given the opportunity to ask questions prior to discharge. They verbalized understanding of discussion of treatment plan, expected course of illness and/or injury, indications on when to return to , when to seek further evaluation in ED/call 911, and the need to follow up with PCP and/or specialist as referred. Patient/parent/guardian was provided with work/school documentation if requested. Patient stable upon discharge.     Orders and Diagnoses  Diagnoses and all orders for this visit:  Urinary frequency  -     POCT UA Automated manually resulted  -     Urine Culture  -     ciprofloxacin (Cipro) 500 mg tablet; Take 1 tablet (500 mg) by mouth 2 times a day for 7 days.      Medical Admin Record      Patient disposition: Home    Electronically signed by Peace Goel PA-C  12:09 PM           [1] (Not in a hospital admission)   [2]   Past Medical History:  Diagnosis Date    Aortic stenosis 11/15/2023    moderate    Benign  neoplasm, unspecified site     Adenoma    Corns and callosities     Pre-ulcerative corn or callous    Hospital discharge follow-up 03/19/2024    Hyperlipidemia 02/23/2023    Hypertension 02/23/2023    Impingement syndrome of unspecified shoulder     Shoulder impingement syndrome    Mitral stenosis 11/15/2023    Personal history of other diseases of the nervous system and sense organs     History of carpal tunnel syndrome    Personal history of other diseases of urinary system     History of hematuria    Personal history of other endocrine, nutritional and metabolic disease     History of hypokalemia    Personal history of other endocrine, nutritional and metabolic disease     History of obesity    Personal history of other endocrine, nutritional and metabolic disease     History of hyperlipidemia    Personal history of other endocrine, nutritional and metabolic disease     History of hypothyroidism    Personal history of other specified conditions     History of nausea    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    Shortness of breath 11/15/2023    Transient complete heart block 03/01/2024    Unspecified tear of unspecified meniscus, current injury, left knee, initial encounter     Tear of cartilage of left knee   [3]   Past Surgical History:  Procedure Laterality Date    ANOMALOUS PULMONARY VENOUS RETURN REPAIR, 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 Cardiac Cath Lab;  Service: Cardiovascular;  Laterality: N/A;    CARDIAC CATHETERIZATION N/A 1/30/2024    Procedure: TAVR (Transcatheter AV Replacement);  Surgeon: Juan Carrasco MD;  Location: [address] Cardiac Cath Lab;   Service: Cardiovascular;  Laterality: N/A;  [employer] Evolut FX 29, schedule at 10am, Possible RESEARCH    CARDIAC CATHETERIZATION N/A 2024    Procedure: TVP for TAVR;  Surgeon: Juan Carrasco MD;  Location: [address] Cardiac Cath Lab;  Service: Cardiovascular;  Laterality: N/A;    CARDIAC CATHETERIZATION N/A 2024    Procedure: Left Heart Cath, No LV;  Surgeon: Juan Carrasco MD;  Location: [address] Cardiac Cath Lab;  Service: Cardiovascular;  Laterality: N/A;    KNEE SURGERY  2018    Knee Surgery    OTHER SURGICAL HISTORY  2018    Surgically Induced

## 2025-06-17 LAB — BACTERIA UR CULT: NORMAL

## (undated) DEVICE — SHEATH, GLIDESHEATH, SLENDER, 6FR 10CM

## (undated) DEVICE — SLEEVE, REPOSITIONING, W/C-LOCK ADAPTER, 60 CM

## (undated) DEVICE — CATHETER, DIAGNOSTIC, INFINITI, 4 FR, PIG, 110CM

## (undated) DEVICE — INTRODUCER, SHEATH, FAST-CATH, 7 FR X 23 CM

## (undated) DEVICE — SHEATH, PINNACLE, 10 CM,  4FR INTRODUCER, 4FR DIA, 2.5 CM DIALATOR

## (undated) DEVICE — CATHETER, SWAN GANZ, DBL LUMEN, MONITORING, 5 FR

## (undated) DEVICE — INTRODUCER, GLIDESHEATH SLENDER A-KIT, 5FR 10CM

## (undated) DEVICE — CATHETER, ANGIO, IMPULSE, FR4, 6 FR X 100 CM

## (undated) DEVICE — CABLE, PACING PATIENT BIPOLAR 8'

## (undated) DEVICE — TR BAND, RADIAL COMPRESSION, STANDARD, 24CM

## (undated) DEVICE — GUIDE WIRE, 035/190CM, HI-TORGUE SUPRA CORE

## (undated) DEVICE — GUIDEWIRE, INQWIRE, .035, 150CM, STRT TIP

## (undated) DEVICE — DEVICE, CLOSURE, PERCLOSE, PROSTYLE

## (undated) DEVICE — GUIDEWIRE, SAFARI2, EXTRA SUPPORT, EXTRA SMALL CURVE

## (undated) DEVICE — CATHETER, ANGIO, EXPO, 5FR FL4 CURVE

## (undated) DEVICE — GUIDEWIRE, INQWIRE, 3MM J, .035, 260

## (undated) DEVICE — CATHETER, DIAG, EXPO, 5FR FR4

## (undated) DEVICE — ACCESS KIT, MINI MAK, 4 FR X 10CM, 0.018 X 40CM, NITINOL/PLATINUM, STD NEEDLE

## (undated) DEVICE — SHEATH, PINNACLE, W/.038 GW 10CM, 5FR INTRODUCER, 2.5 CM DIALATOR

## (undated) DEVICE — DELIVERY SYSTEM, EVOLUT FX, 23-29MM

## (undated) DEVICE — GUIDEWIRE, INQWIRE, 3MM J, .035, 150

## (undated) DEVICE — ACCESS KIT, MINI MAK, 4FR X 10CML, 0.018 X 40CM, SS/SS, ECHO ENHANCED 7CM NDL

## (undated) DEVICE — INTRODUCER, HYDROPHILIC, PRELUDE SNAP, 7 FR X 13 CM, ORANGE

## (undated) DEVICE — GUIDEWIRE, STIFF SHAFT, ANGLE TIP, .035 DIA, 180 CM,  3 CM TIP"

## (undated) DEVICE — CATHETER, ANGIO, IMPULSE, PIG 155, 6 FR X 110 CM

## (undated) DEVICE — CATHETER, ANGIO, IMPULSE, PIGTAIL, 6 FR X 110 CM

## (undated) DEVICE — Device

## (undated) DEVICE — SNARE RETRIEVAL, ENSNARE 3-LOOP 18MM X 30MM

## (undated) DEVICE — INTRODUCER SHEATH, SENTRANT ENSURE SEAL 14FR 28CM

## (undated) DEVICE — GUIDEWIRE, INQWIRE, 3MM J, .035 X 210CM, FIXED

## (undated) DEVICE — CATHETER, BALLOON, VALVULOPLASTY, TRUE, 110 X 18 X 4.5

## (undated) DEVICE — CATHETER, PACING, PACEL, FLOW DIRECTED, 5 FR X 110 CM